# Patient Record
Sex: FEMALE | Race: WHITE | NOT HISPANIC OR LATINO
[De-identification: names, ages, dates, MRNs, and addresses within clinical notes are randomized per-mention and may not be internally consistent; named-entity substitution may affect disease eponyms.]

---

## 2017-09-26 ENCOUNTER — RESULT REVIEW (OUTPATIENT)
Age: 47
End: 2017-09-26

## 2017-10-11 ENCOUNTER — TRANSCRIPTION ENCOUNTER (OUTPATIENT)
Age: 47
End: 2017-10-11

## 2018-05-11 ENCOUNTER — LABORATORY RESULT (OUTPATIENT)
Age: 48
End: 2018-05-11

## 2018-05-11 ENCOUNTER — APPOINTMENT (OUTPATIENT)
Dept: PLASTIC SURGERY | Facility: CLINIC | Age: 48
End: 2018-05-11
Payer: MEDICAID

## 2018-05-11 VITALS — BODY MASS INDEX: 37.79 KG/M2 | HEIGHT: 72 IN | WEIGHT: 279 LBS

## 2018-05-11 PROCEDURE — 11422 EXC H-F-NK-SP B9+MARG 1.1-2: CPT | Mod: 59

## 2018-05-11 PROCEDURE — 99204 OFFICE O/P NEW MOD 45 MIN: CPT | Mod: 25

## 2018-05-11 PROCEDURE — 14040 TIS TRNFR F/C/C/M/N/A/G/H/F: CPT

## 2018-05-24 ENCOUNTER — APPOINTMENT (OUTPATIENT)
Dept: PLASTIC SURGERY | Facility: CLINIC | Age: 48
End: 2018-05-24
Payer: MEDICAID

## 2018-05-24 PROCEDURE — 99024 POSTOP FOLLOW-UP VISIT: CPT

## 2018-06-29 ENCOUNTER — APPOINTMENT (OUTPATIENT)
Dept: PLASTIC SURGERY | Facility: CLINIC | Age: 48
End: 2018-06-29
Payer: MEDICAID

## 2018-06-29 DIAGNOSIS — L81.7 PIGMENTED PURPURIC DERMATOSIS: ICD-10-CM

## 2018-06-29 DIAGNOSIS — G43.909 MIGRAINE, UNSPECIFIED, NOT INTRACTABLE, W/OUT STATUS MIGRAINOSUS: ICD-10-CM

## 2018-06-29 DIAGNOSIS — L98.0 PYOGENIC GRANULOMA: ICD-10-CM

## 2018-06-29 PROCEDURE — 99024 POSTOP FOLLOW-UP VISIT: CPT

## 2018-07-03 RX ORDER — HYDROCHLOROTHIAZIDE 12.5 MG/1
12.5 TABLET ORAL
Qty: 90 | Refills: 0 | Status: ACTIVE | COMMUNITY
Start: 2018-03-19

## 2018-07-03 RX ORDER — ALBUTEROL SULFATE 0.63 MG/3ML
0.63 SOLUTION RESPIRATORY (INHALATION)
Qty: 270 | Refills: 0 | Status: ACTIVE | COMMUNITY
Start: 2018-01-08

## 2018-07-25 PROBLEM — L81.7 SCHAMBERG'S DISEASE: Status: ACTIVE | Noted: 2018-05-11

## 2018-07-25 PROBLEM — G43.909 MIGRAINES: Status: ACTIVE | Noted: 2018-05-11

## 2018-07-25 PROBLEM — L98.0 PYOGENIC GRANULOMA: Status: ACTIVE | Noted: 2018-05-14

## 2019-02-19 ENCOUNTER — APPOINTMENT (OUTPATIENT)
Dept: VASCULAR SURGERY | Facility: CLINIC | Age: 49
End: 2019-02-19
Payer: MEDICAID

## 2019-02-19 VITALS
SYSTOLIC BLOOD PRESSURE: 125 MMHG | WEIGHT: 257 LBS | DIASTOLIC BLOOD PRESSURE: 78 MMHG | HEART RATE: 89 BPM | TEMPERATURE: 98 F | HEIGHT: 72 IN | BODY MASS INDEX: 34.81 KG/M2

## 2019-02-19 DIAGNOSIS — Z81.8 FAMILY HISTORY OF OTHER MENTAL AND BEHAVIORAL DISORDERS: ICD-10-CM

## 2019-02-19 DIAGNOSIS — I83.90 ASYMPTOMATIC VARICOSE VEINS OF UNSPECIFIED LOWER EXTREMITY: ICD-10-CM

## 2019-02-19 PROCEDURE — 93970 EXTREMITY STUDY: CPT

## 2019-02-19 PROCEDURE — 99204 OFFICE O/P NEW MOD 45 MIN: CPT

## 2019-02-19 RX ORDER — GLUC/MSM/COLGN2/HYAL/ANTIARTH3 375-375-20
TABLET ORAL
Refills: 0 | Status: ACTIVE | COMMUNITY

## 2019-02-19 RX ORDER — MELATONIN 3 MG
TABLET ORAL
Refills: 0 | Status: ACTIVE | COMMUNITY

## 2019-02-19 RX ORDER — CYST/ALA/Q10/PHOS.SER/DHA/BROC 100-20-50
500 POWDER (GRAM) ORAL
Refills: 0 | Status: ACTIVE | COMMUNITY

## 2019-02-19 RX ORDER — FLUCONAZOLE 150 MG/1
150 TABLET ORAL
Qty: 1 | Refills: 0 | Status: COMPLETED | COMMUNITY
Start: 2017-09-26 | End: 2019-02-19

## 2019-02-19 RX ORDER — PROMETHAZINE HYDROCHLORIDE AND DEXTROMETHORPHAN HYDROBROMIDE ORAL SOLUTION 15; 6.25 MG/5ML; MG/5ML
6.25-15 SOLUTION ORAL
Qty: 118 | Refills: 0 | Status: COMPLETED | COMMUNITY
Start: 2018-02-23 | End: 2019-02-19

## 2019-02-19 RX ORDER — TRIAMCINOLONE ACETONIDE 1 MG/G
0.1 OINTMENT TOPICAL
Qty: 30 | Refills: 0 | Status: COMPLETED | COMMUNITY
Start: 2017-08-01 | End: 2019-02-19

## 2019-02-19 RX ORDER — MULTIVIT-MIN/IRON/FOLIC ACID/K 18-600-40
CAPSULE ORAL
Refills: 0 | Status: ACTIVE | COMMUNITY

## 2019-02-19 RX ORDER — AZITHROMYCIN 250 MG/1
250 TABLET, FILM COATED ORAL
Qty: 6 | Refills: 0 | Status: COMPLETED | COMMUNITY
Start: 2018-02-23 | End: 2019-02-19

## 2019-02-19 RX ORDER — MULTIVITAMIN
TABLET ORAL
Refills: 0 | Status: ACTIVE | COMMUNITY

## 2019-02-19 RX ORDER — PNV NO.95/FERROUS FUM/FOLIC AC 28MG-0.8MG
TABLET ORAL
Refills: 0 | Status: ACTIVE | COMMUNITY

## 2019-02-19 RX ORDER — NAPROXEN 500 MG/1
500 TABLET ORAL
Qty: 60 | Refills: 0 | Status: COMPLETED | COMMUNITY
Start: 2017-07-18 | End: 2019-02-19

## 2019-02-19 RX ORDER — DILTIAZEM HYDROCHLORIDE 120 MG/1
120 TABLET, COATED ORAL
Refills: 0 | Status: COMPLETED | COMMUNITY
End: 2019-02-19

## 2019-02-19 RX ORDER — PSYLLIUM HUSK 0.4 G
CAPSULE ORAL
Refills: 0 | Status: ACTIVE | COMMUNITY

## 2019-02-19 RX ORDER — DILTIAZEM HYDROCHLORIDE 120 MG/1
120 CAPSULE, EXTENDED RELEASE ORAL
Qty: 30 | Refills: 0 | Status: COMPLETED | COMMUNITY
Start: 2017-12-28 | End: 2019-02-19

## 2019-02-19 NOTE — ASSESSMENT
[FreeTextEntry1] : 47 yo female with history of pre-htn, asthma, schamberg's disease, migraines presents for evaluation of lower extremity varicose veins with recent history of bleeding varicose vein and skin ulceration \par venous duplex shows insufficiency throughout the gsv bilaterally \par given the recent bleed and eschar over skin ulceration would recommend ablation and stab phlebectomy at this time \par will schedule for ablation in the near future

## 2019-02-19 NOTE — HISTORY OF PRESENT ILLNESS
[FreeTextEntry1] : 47 yo female with history of pre-htn, asthma, schamberg's disease, migraines presents for evaluation of lower extremity varicose veins with recent history of bleeding varicose vein and skin ulceration.  pt states that her legs have been very puritic and she was scratching which had initiated the break in the skin leading to the bleeding episode about 3 weeks ago.  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  she wears sport compression stockings but is unsure of the pressure.  pt states that in January was started on a steroid cream for the leg puritis and discoloration.  \par she has been treating the ulcer with peroxide and Betadine over the last few weeks \par pt denies any history of ulcers in the past, or dvt\par pt denies any history of lower extremity pain or difficulty walking

## 2019-02-19 NOTE — PHYSICAL EXAM
[2+] : left 2+ [Ankle Swelling (On Exam)] : present [Varicose Veins Of Lower Extremities] : bilaterally [Ankle Swelling Bilaterally] : severe [] : bilaterally [Ankle Swelling On The Left] : moderate [No Rash or Lesion] : No rash or lesion [Skin Ulcer] : ulcer [Alert] : alert [Calm] : calm [JVD] : no jugular venous distention  [Abdomen Tenderness] : ~T ~M No abdominal tenderness [de-identified] : appears well [de-identified] : motor intact b/l lower extremities, muscle strength 5/5 with dorsi and plantar flexion\par

## 2019-03-03 ENCOUNTER — TRANSCRIPTION ENCOUNTER (OUTPATIENT)
Age: 49
End: 2019-03-03

## 2019-03-08 ENCOUNTER — APPOINTMENT (OUTPATIENT)
Dept: ENDOVASCULAR SURGERY | Facility: CLINIC | Age: 49
End: 2019-03-08

## 2019-03-29 ENCOUNTER — APPOINTMENT (OUTPATIENT)
Dept: ENDOVASCULAR SURGERY | Facility: CLINIC | Age: 49
End: 2019-03-29
Payer: MEDICAID

## 2019-03-29 ENCOUNTER — LABORATORY RESULT (OUTPATIENT)
Age: 49
End: 2019-03-29

## 2019-03-29 PROCEDURE — 36475 ENDOVENOUS RF 1ST VEIN: CPT | Mod: RT

## 2019-03-29 PROCEDURE — 37765 STAB PHLEB VEINS XTR 10-20: CPT | Mod: RT

## 2019-04-02 ENCOUNTER — APPOINTMENT (OUTPATIENT)
Dept: VASCULAR SURGERY | Facility: CLINIC | Age: 49
End: 2019-04-02
Payer: MEDICAID

## 2019-04-02 PROCEDURE — 99024 POSTOP FOLLOW-UP VISIT: CPT

## 2019-04-02 PROCEDURE — 93971 EXTREMITY STUDY: CPT

## 2019-04-04 NOTE — DISCUSSION/SUMMARY
[FreeTextEntry1] : 47 yo female with history of venous stasis ulcer presents for follow up after ablation and stab phlebectomy \par duplex shows gsv ablated no evidence of dvt \par recommend compression and elevation pt to follow up in 1 month

## 2019-04-04 NOTE — REASON FOR VISIT
[de-identified] : right lower extremity gsv ablation and stab phlebectomy [de-identified] : 47 yo with history of venous insufficiency s/p ablation and stab phlebectomy presents for follow up without any complaints.

## 2019-04-04 NOTE — PHYSICAL EXAM
[Ankle Swelling (On Exam)] : present [Ankle Swelling Bilaterally] : bilaterally  [Ankle Swelling On The Right] : mild [] : of the right leg [Ankle Swelling On The Left] : moderate [JVD] : no jugular venous distention  [Varicose Veins Of Lower Extremities] : not present [de-identified] : appears well  [de-identified] : erythema over right medial thigh, venous stasis ulcer over the right distal medial shin with pink granular base

## 2019-05-07 ENCOUNTER — APPOINTMENT (OUTPATIENT)
Dept: VASCULAR SURGERY | Facility: CLINIC | Age: 49
End: 2019-05-07
Payer: MEDICAID

## 2019-05-07 VITALS
TEMPERATURE: 98 F | SYSTOLIC BLOOD PRESSURE: 120 MMHG | HEIGHT: 72 IN | WEIGHT: 154 LBS | HEART RATE: 85 BPM | BODY MASS INDEX: 20.86 KG/M2 | DIASTOLIC BLOOD PRESSURE: 79 MMHG

## 2019-05-07 PROCEDURE — 99213 OFFICE O/P EST LOW 20 MIN: CPT | Mod: 24

## 2019-05-07 NOTE — PHYSICAL EXAM
[2+] : left 2+ [Varicose Veins Of Lower Extremities] : bilaterally [Ankle Swelling On The Right] : mild [] : of the right leg [No Rash or Lesion] : No rash or lesion [Alert] : alert [Calm] : calm [JVD] : no jugular venous distention  [Ankle Swelling (On Exam)] : not present [Skin Ulcer] : no ulcer [de-identified] : appears well

## 2019-05-07 NOTE — HISTORY OF PRESENT ILLNESS
[FreeTextEntry1] : 50 yo female with history of venous stasis ulcer s/p ablation of the right lower extremity presents for follow up.  ulcer is healed but has noted some puritis around the area otherwise pt denies any pain of the lower extremities.  she has been wearing the compression stockings without any issues

## 2019-05-07 NOTE — ASSESSMENT
[FreeTextEntry1] : 50 yo female with history of venous stasis ulcer s/p ablation of the right lower extremity presents for follow up\par pt doing well after ablation will start lidex for the puritis no to be used for longer then 2 weeks\par pt to follow up in 1 month and continue with compression stockings

## 2019-06-11 ENCOUNTER — APPOINTMENT (OUTPATIENT)
Dept: VASCULAR SURGERY | Facility: CLINIC | Age: 49
End: 2019-06-11
Payer: MEDICAID

## 2019-06-11 VITALS — WEIGHT: 263 LBS | HEIGHT: 72 IN | BODY MASS INDEX: 35.62 KG/M2

## 2019-06-11 PROCEDURE — 99024 POSTOP FOLLOW-UP VISIT: CPT

## 2019-06-11 PROCEDURE — XXXXX: CPT

## 2019-06-11 NOTE — PHYSICAL EXAM
[JVD] : no jugular venous distention  [2+] : left 2+ [Ankle Swelling (On Exam)] : not present [Varicose Veins Of Lower Extremities] : present [Varicose Veins Of The Left Leg] : of the left leg [Ankle Swelling On The Left] : moderate [] : of the right leg [Ankle Swelling On The Right] : mild [No Rash or Lesion] : No rash or lesion [Skin Ulcer] : no ulcer [Alert] : alert [Calm] : calm [de-identified] : appears well

## 2019-06-11 NOTE — HISTORY OF PRESENT ILLNESS
[FreeTextEntry1] : 50 yo female with history of venous stasis ulcer s/p ablation of the right lower extremity gsv presents for follow up.  ulcer is healed and the puritis has improved \par pt without any complaints at this time states that she is wearing her compression stockings.

## 2019-06-11 NOTE — ASSESSMENT
[FreeTextEntry1] : 48 yo female with history of venous stasis ulcer s/p ablation of the right lower extremity gsv presents for follow up without any complaints\par ulcer has healed and pt without any complaints at this time\par pt to continue to wear the compression stockings and advised to follow up as needed

## 2019-11-07 ENCOUNTER — OUTPATIENT (OUTPATIENT)
Dept: OUTPATIENT SERVICES | Facility: HOSPITAL | Age: 49
LOS: 1 days | End: 2019-11-07
Payer: MEDICAID

## 2019-11-07 ENCOUNTER — APPOINTMENT (OUTPATIENT)
Dept: MAMMOGRAPHY | Facility: IMAGING CENTER | Age: 49
End: 2019-11-07
Payer: MEDICAID

## 2019-11-07 ENCOUNTER — APPOINTMENT (OUTPATIENT)
Dept: ULTRASOUND IMAGING | Facility: IMAGING CENTER | Age: 49
End: 2019-11-07
Payer: MEDICAID

## 2019-11-07 DIAGNOSIS — Z00.8 ENCOUNTER FOR OTHER GENERAL EXAMINATION: ICD-10-CM

## 2019-11-07 PROCEDURE — 77067 SCR MAMMO BI INCL CAD: CPT | Mod: 26

## 2019-11-07 PROCEDURE — 77063 BREAST TOMOSYNTHESIS BI: CPT | Mod: 26

## 2019-11-07 PROCEDURE — 76641 ULTRASOUND BREAST COMPLETE: CPT | Mod: 26,50

## 2019-11-07 PROCEDURE — 77063 BREAST TOMOSYNTHESIS BI: CPT

## 2019-11-07 PROCEDURE — 77067 SCR MAMMO BI INCL CAD: CPT

## 2019-11-07 PROCEDURE — 76641 ULTRASOUND BREAST COMPLETE: CPT

## 2021-02-13 ENCOUNTER — TRANSCRIPTION ENCOUNTER (OUTPATIENT)
Age: 51
End: 2021-02-13

## 2021-03-25 ENCOUNTER — RESULT REVIEW (OUTPATIENT)
Age: 51
End: 2021-03-25

## 2021-05-17 ENCOUNTER — NON-APPOINTMENT (OUTPATIENT)
Age: 51
End: 2021-05-17

## 2021-06-09 DIAGNOSIS — Z01.818 ENCOUNTER FOR OTHER PREPROCEDURAL EXAMINATION: ICD-10-CM

## 2021-06-11 ENCOUNTER — APPOINTMENT (OUTPATIENT)
Dept: DISASTER EMERGENCY | Facility: CLINIC | Age: 51
End: 2021-06-11

## 2021-06-11 LAB — SARS-COV-2 N GENE NPH QL NAA+PROBE: NOT DETECTED

## 2021-06-16 ENCOUNTER — NON-APPOINTMENT (OUTPATIENT)
Age: 51
End: 2021-06-16

## 2021-06-16 ENCOUNTER — LABORATORY RESULT (OUTPATIENT)
Age: 51
End: 2021-06-16

## 2021-06-16 ENCOUNTER — APPOINTMENT (OUTPATIENT)
Dept: PULMONOLOGY | Facility: CLINIC | Age: 51
End: 2021-06-16
Payer: MEDICAID

## 2021-06-16 VITALS
TEMPERATURE: 98 F | RESPIRATION RATE: 16 BRPM | SYSTOLIC BLOOD PRESSURE: 148 MMHG | OXYGEN SATURATION: 98 % | DIASTOLIC BLOOD PRESSURE: 85 MMHG | HEART RATE: 63 BPM

## 2021-06-16 LAB
BASOPHILS # BLD AUTO: 0.04 K/UL
BASOPHILS NFR BLD AUTO: 0.6 %
EOSINOPHIL # BLD AUTO: 0.32 K/UL
EOSINOPHIL NFR BLD AUTO: 5 %
HCT VFR BLD CALC: 38.4 %
HGB BLD-MCNC: 13.2 G/DL
IMM GRANULOCYTES NFR BLD AUTO: 0.3 %
LYMPHOCYTES # BLD AUTO: 1.37 K/UL
LYMPHOCYTES NFR BLD AUTO: 21.6 %
MAN DIFF?: NORMAL
MCHC RBC-ENTMCNC: 32 PG
MCHC RBC-ENTMCNC: 34.4 GM/DL
MCV RBC AUTO: 93.2 FL
MONOCYTES # BLD AUTO: 0.44 K/UL
MONOCYTES NFR BLD AUTO: 6.9 %
NEUTROPHILS # BLD AUTO: 4.16 K/UL
NEUTROPHILS NFR BLD AUTO: 65.6 %
PLATELET # BLD AUTO: 236 K/UL
POCT - HEMOGLOBIN (HGB), QUANTITATIVE, TRANSCUTANEOUS: 11.6
RBC # BLD: 4.12 M/UL
RBC # FLD: 11.9 %
WBC # FLD AUTO: 6.35 K/UL

## 2021-06-16 PROCEDURE — 94727 GAS DIL/WSHOT DETER LNG VOL: CPT

## 2021-06-16 PROCEDURE — 94010 BREATHING CAPACITY TEST: CPT

## 2021-06-16 PROCEDURE — ZZZZZ: CPT

## 2021-06-16 PROCEDURE — 88738 HGB QUANT TRANSCUTANEOUS: CPT

## 2021-06-16 PROCEDURE — 94729 DIFFUSING CAPACITY: CPT

## 2021-06-16 PROCEDURE — 99205 OFFICE O/P NEW HI 60 MIN: CPT | Mod: 25

## 2021-06-16 PROCEDURE — 71046 X-RAY EXAM CHEST 2 VIEWS: CPT

## 2021-06-16 RX ORDER — BECLOMETHASONE DIPROPIONATE HFA 80 UG/1
80 AEROSOL, METERED RESPIRATORY (INHALATION)
Qty: 11 | Refills: 0 | Status: DISCONTINUED | COMMUNITY
Start: 2018-03-02 | End: 2021-06-16

## 2021-06-16 NOTE — PROCEDURE
[FreeTextEntry1] : Blood Draw\par \par PFT June 16, 2021\par Well-preserved flow rates\par Mild obstructive ventilatory impairment\par Normal lung volumes\par Air trapping with RV/TLC ratio 131% predicted\par Diffusion normal\par \par Chest x-ray PA lateral March 16, 2021\par Normal cardiac size\par Hyper aeration increased retrosternal airspace\par Lung grossly clear lungs without parenchymal infiltrates pleural effusions or dominant pulmonary nodules\par

## 2021-06-16 NOTE — REVIEW OF SYSTEMS
[Negative] : Endocrine [TextBox_30] : HPI [TextBox_44] : History of PVCs on beta-blocker [TextBox_57] : ? Dogs [TextBox_94] : Cervical spine degeneration [TextBox_104] : Hx pyogenic granuloma [TextBox_122] : Migraine headache [TextBox_151] : Schamberg disease, varicose veins

## 2021-06-16 NOTE — DISCUSSION/SUMMARY
[FreeTextEntry1] : Chronic persistent asthma\par Pulmonary physiology mild obstructive ventilatory impairment air-trapping\par Vascular history as noted above\par History of secondhand passive tobacco exposure\par Rule out atopy  allergenic component\par History of PVCs on beta-blocker but as long as she demonstrates continued stable pulmonary physiology without decline on above-noted medications would not discontinue beta-blocker at this time\par Recommendations\par Blood draw for asthma profile Food IgE serum IgE level eosinophil count\par Agree with current dosing of Wixela  50-50 1 puff twice daily with instructions to rinse\par Singulair 10 mg daily with food\par As needed short acting beta agonist.asthma\par Notify of any wheezing.  Notify if rescue inhaler is needed greater than 2-3 times in any week.  Avoid known triggers.\par \par \par

## 2021-06-16 NOTE — CONSULT LETTER
[Dear  ___] : Dear  [unfilled], [Consult Letter:] : I had the pleasure of evaluating your patient, [unfilled]. [Please see my note below.] : Please see my note below. [Consult Closing:] : Thank you very much for allowing me to participate in the care of this patient.  If you have any questions, please do not hesitate to contact me. [Sincerely,] : Sincerely, [FreeTextEntry3] : Abhay Brown D.O., GEMMA\par  of Medicine\par [facility] [employer]\par

## 2021-06-16 NOTE — HISTORY OF PRESENT ILLNESS
[Never] : never [TextBox_4] : Pulmonary evaluation\par Longstanding history of asthma since the age of 24\par She states that she has had dog exposure dating back to November 2020 she is young for asthma-like symptoms within allergy component\par She has had wheezing chest congestion cough\par Subsequently her Qvar that she had been previously treated with was discontinued and she was changed to Wixela continue dosing of Singulair with interval improvement of symptoms\par She states she believes in November she was treated with a course of steroids for short-term\par She is not actively wheezing\par No reported purulent sputum hemoptysis\par No fevers chills or sweats\par She denies shortness of breath dyspnea on exertion pleuritic chest pain\par She has no history of hospitalization or intubation for asthma\par Denies history of pneumonia tuberculosis latent tuberculosis interstitial lung disease obstructive sleep apnea pulmonary embolism\par She does report that she is a non-smoker but had significant secondhand tobacco smoke use\par Notes on beta-blocker for PVCs\par

## 2021-06-16 NOTE — PHYSICAL EXAM
[No Acute Distress] : no acute distress [Normal Oropharynx] : normal oropharynx [II] : Mallampati Class: II [Normal Appearance] : normal appearance [Supple] : supple [No Neck Mass] : no neck mass [No JVD] : no jvd [Normal Rate/Rhythm] : normal rate/rhythm [Normal Pulses] : normal pulses [Normal PMI] : normal pmi [Normal S1, S2] : normal s1, s2 [No Murmurs] : no murmurs [No Rubs] : no rubs [No Gallops] : no gallops [No Resp Distress] : no resp distress [No Acc Muscle Use] : no acc muscle use [Normal Palpation] : normal palpation [Normal Rhythm and Effort] : normal rhythm and effort [Clear to Auscultation Bilaterally] : clear to auscultation bilaterally [No Abnormalities] : no abnormalities [Benign] : benign [Not Tender] : not tender [Soft] : soft [Normal Bowel Sounds] : normal bowel sounds [No HSM] : no hsm [Normal Gait] : normal gait [No Clubbing] : no clubbing [No Cyanosis] : no cyanosis [No Edema] : no edema [FROM] : FROM [Normal Color/ Pigmentation] : normal color/ pigmentation [No Focal Deficits] : no focal deficits [Oriented x3] : oriented x3 [Normal Mood] : normal mood [Normal Affect] : normal affect [TextBox_2] : Overweight

## 2021-06-17 DIAGNOSIS — J30.2 OTHER SEASONAL ALLERGIC RHINITIS: ICD-10-CM

## 2021-06-18 LAB
A ALTERNATA IGE QN: 6.33 KUA/L
A FUMIGATUS IGE QN: 0.12 KUA/L
C ALBICANS IGE QN: <0.1 KUA/L
C HERBARUM IGE QN: <0.1 KUA/L
CAT DANDER IGE QN: 35.1 KUA/L
CLAM IGE QN: <0.1 KUA/L
CODFISH IGE QN: <0.1 KUA/L
COMMON RAGWEED IGE QN: 0.82 KUA/L
CORN IGE QN: 0.38 KUA/L
COW MILK IGE QN: 0.19 KUA/L
D FARINAE IGE QN: 2.85 KUA/L
D PTERONYSS IGE QN: 1.78 KUA/L
DEPRECATED A ALTERNATA IGE RAST QL: 3
DEPRECATED A FUMIGATUS IGE RAST QL: NORMAL
DEPRECATED C ALBICANS IGE RAST QL: 0
DEPRECATED C HERBARUM IGE RAST QL: 0
DEPRECATED CAT DANDER IGE RAST QL: 4
DEPRECATED CLAM IGE RAST QL: 0
DEPRECATED CODFISH IGE RAST QL: 0
DEPRECATED COMMON RAGWEED IGE RAST QL: 2
DEPRECATED CORN IGE RAST QL: 1
DEPRECATED COW MILK IGE RAST QL: NORMAL
DEPRECATED D FARINAE IGE RAST QL: 2
DEPRECATED D PTERONYSS IGE RAST QL: 2
DEPRECATED DOG DANDER IGE RAST QL: 3
DEPRECATED EGG WHITE IGE RAST QL: NORMAL
DEPRECATED M RACEMOSUS IGE RAST QL: 0
DEPRECATED PEANUT IGE RAST QL: 2
DEPRECATED ROACH IGE RAST QL: 0
DEPRECATED SCALLOP IGE RAST QL: <0.1 KUA/L
DEPRECATED SESAME SEED IGE RAST QL: 1
DEPRECATED SHRIMP IGE RAST QL: 0
DEPRECATED SOYBEAN IGE RAST QL: NORMAL
DEPRECATED TIMOTHY IGE RAST QL: 1
DEPRECATED WALNUT IGE RAST QL: NORMAL
DEPRECATED WHEAT IGE RAST QL: NORMAL
DEPRECATED WHITE OAK IGE RAST QL: 3
DOG DANDER IGE QN: 14.8 KUA/L
EGG WHITE IGE QN: 0.34 KUA/L
M RACEMOSUS IGE QN: <0.1 KUA/L
PEANUT IGE QN: 1.39 KUA/L
ROACH IGE QN: <0.1 KUA/L
SCALLOP IGE QN: 0
SCALLOP IGE QN: <0.1 KUA/L
SESAME SEED IGE QN: 0.43 KUA/L
SOYBEAN IGE QN: 0.2 KUA/L
TIMOTHY IGE QN: 0.46 KUA/L
WALNUT IGE QN: 0.13 KUA/L
WHEAT IGE QN: 0.16 KUA/L
WHITE OAK IGE QN: 3.56 KUA/L

## 2021-06-20 LAB
A FLAVUS AB FLD QL: NEGATIVE
A FUMIGATUS AB FLD QL: NEGATIVE
A NIGER AB FLD QL: NEGATIVE

## 2021-07-22 ENCOUNTER — APPOINTMENT (OUTPATIENT)
Dept: PULMONOLOGY | Facility: CLINIC | Age: 51
End: 2021-07-22

## 2021-08-12 ENCOUNTER — APPOINTMENT (OUTPATIENT)
Dept: PULMONOLOGY | Facility: CLINIC | Age: 51
End: 2021-08-12
Payer: MEDICAID

## 2021-08-12 VITALS
HEART RATE: 57 BPM | OXYGEN SATURATION: 96 % | SYSTOLIC BLOOD PRESSURE: 130 MMHG | DIASTOLIC BLOOD PRESSURE: 81 MMHG | TEMPERATURE: 97.9 F

## 2021-08-12 PROCEDURE — 94010 BREATHING CAPACITY TEST: CPT

## 2021-08-12 PROCEDURE — 99213 OFFICE O/P EST LOW 20 MIN: CPT | Mod: 25

## 2021-08-12 NOTE — CONSULT LETTER
[Dear  ___] : Dear  [unfilled], [Consult Letter:] : I had the pleasure of evaluating your patient, [unfilled]. [Please see my note below.] : Please see my note below. [Consult Closing:] : Thank you very much for allowing me to participate in the care of this patient.  If you have any questions, please do not hesitate to contact me. [Sincerely,] : Sincerely, [FreeTextEntry3] : Abhay Brown D.O., GEMMA\par  of Medicine\par [facility] [employer]\par

## 2021-08-12 NOTE — PHYSICAL EXAM
[No Acute Distress] : no acute distress [Normal Oropharynx] : normal oropharynx [II] : Mallampati Class: II [Normal Appearance] : normal appearance [Supple] : supple [No Neck Mass] : no neck mass [No JVD] : no jvd [Normal Rate/Rhythm] : normal rate/rhythm [Normal Pulses] : normal pulses [Normal PMI] : normal pmi [Normal S1, S2] : normal s1, s2 [No Murmurs] : no murmurs [No Rubs] : no rubs [No Gallops] : no gallops [No Resp Distress] : no resp distress [No Acc Muscle Use] : no acc muscle use [Normal Palpation] : normal palpation [Normal Rhythm and Effort] : normal rhythm and effort [Clear to Auscultation Bilaterally] : clear to auscultation bilaterally [No Abnormalities] : no abnormalities [Benign] : benign [Not Tender] : not tender [Soft] : soft [No HSM] : no hsm [Normal Bowel Sounds] : normal bowel sounds [Normal Gait] : normal gait [No Clubbing] : no clubbing [No Cyanosis] : no cyanosis [No Edema] : no edema [FROM] : FROM [Normal Color/ Pigmentation] : normal color/ pigmentation [No Focal Deficits] : no focal deficits [Oriented x3] : oriented x3 [Normal Mood] : normal mood [Normal Affect] : normal affect [TextBox_2] : Overweight

## 2021-08-12 NOTE — PROCEDURE
[FreeTextEntry1] : HUE No BD 8/12/21\par Mild OAD\par  stable pulmonary physiology\par \par PFT June 16, 2021\par Well-preserved flow rates\par Mild obstructive ventilatory impairment\par Normal lung volumes\par Air trapping with RV/TLC ratio 131% predicted\par Diffusion normal\par \par Chest x-ray PA lateral  June 16, 2021\par Normal cardiac size\par Hyper aeration increased retrosternal airspace\par Lung grossly clear lungs without parenchymal infiltrates pleural effusions or dominant pulmonary nodules\par

## 2021-08-12 NOTE — DISCUSSION/SUMMARY
[FreeTextEntry1] : Chronic persistent asthma\par Pulmonary physiology mild obstructive ventilatory impairment air-trapping\par Atopy elevated serum IgE level\par Vascular history as noted above\par History of secondhand passive tobacco exposure\par Rule out atopy  allergenic component\par History of PVCs on beta-blocker but as long as she demonstrates continued stable pulmonary physiology without decline on above-noted medications would not discontinue beta-blocker at this time\par Recommendations\par Blood draw for asthma profile Food IgE serum IgE level eosinophil count noted\par Agree with current dosing of Wixela  50-50 1 puff twice daily with instructions to rinse\par Singulair 10 mg daily with food\par As needed short acting beta agonist.asthma\par Notify of any wheezing.  Notify if rescue inhaler is needed greater than 2-3 times in any week.  Avoid known triggers.\par \par \par

## 2021-11-05 DIAGNOSIS — Z01.812 ENCOUNTER FOR PREPROCEDURAL LABORATORY EXAMINATION: ICD-10-CM

## 2021-11-08 ENCOUNTER — APPOINTMENT (OUTPATIENT)
Dept: DISASTER EMERGENCY | Facility: CLINIC | Age: 51
End: 2021-11-08

## 2021-11-09 LAB — SARS-COV-2 N GENE NPH QL NAA+PROBE: NOT DETECTED

## 2021-11-12 ENCOUNTER — APPOINTMENT (OUTPATIENT)
Dept: PULMONOLOGY | Facility: CLINIC | Age: 51
End: 2021-11-12
Payer: MEDICAID

## 2021-11-12 DIAGNOSIS — Z77.22 CONTACT WITH AND (SUSPECTED) EXPOSURE TO ENVIRONMENTAL TOBACCO SMOKE (ACUTE) (CHRONIC): ICD-10-CM

## 2021-11-12 PROCEDURE — 88738 HGB QUANT TRANSCUTANEOUS: CPT

## 2021-11-12 PROCEDURE — 94727 GAS DIL/WSHOT DETER LNG VOL: CPT

## 2021-11-12 PROCEDURE — ZZZZZ: CPT

## 2021-11-12 PROCEDURE — 99214 OFFICE O/P EST MOD 30 MIN: CPT | Mod: 25

## 2021-11-12 PROCEDURE — 94729 DIFFUSING CAPACITY: CPT

## 2021-11-12 PROCEDURE — 94010 BREATHING CAPACITY TEST: CPT

## 2021-11-12 NOTE — REVIEW OF SYSTEMS
[Recent Wt Loss (___ Lbs)] : ~T recent [unfilled] lb weight loss [Negative] : Endocrine [TextBox_30] : HPI [TextBox_44] : History of PVCs on beta-blocker [TextBox_57] : ? Dogs [TextBox_94] : Cervical spine degeneration [TextBox_104] : Hx pyogenic granuloma [TextBox_122] : Migraine headache [TextBox_151] : Schamberg disease, varicose veins

## 2021-11-12 NOTE — PROCEDURE
[FreeTextEntry1] : PFT 11/12/11\par Flow rates nl\par TLC 92 % pred\par DLCO 94 %\par HGB 13.3\par \par HUE No BD 8/12/21\par Mild OAD\par  stable pulmonary physiology\par \par PFT June 16, 2021\par Well-preserved flow rates\par Mild obstructive ventilatory impairment\par Normal lung volumes\par Air trapping with RV/TLC ratio 131% predicted\par Diffusion normal\par \par Chest x-ray PA lateral  June 16, 2021\par Normal cardiac size\par Hyper aeration increased retrosternal airspace\par Lung grossly clear lungs without parenchymal infiltrates pleural effusions or dominant pulmonary nodules\par

## 2021-11-12 NOTE — DISCUSSION/SUMMARY
[FreeTextEntry1] : Chronic persistent asthma\par seasonal allergy\par Pulmonary physiology mild obstructive ventilatory impairment air-trapping\par Atopy elevated serum IgE level\par Vascular history as noted above\par History of secondhand passive tobacco exposure\par Rule out atopy  allergenic component\par History of PVCs on beta-blocker but as long as she demonstrates continued stable pulmonary physiology without decline on above-noted medications would not discontinue beta-blocker at this time\par Recommendations\par Blood draw for asthma profile Food IgE serum IgE level eosinophil count noted\par  current dosing of Wixela  250-50 1 puff twice daily with instructions to rinse\par Singulair 10 mg daily with food\par As needed short acting beta agonist.asthma\par Notify of any wheezing.  Notify if rescue inhaler is needed greater than 2-3 times in any week.  Avoid known triggers.\par \par \par

## 2021-11-12 NOTE — CONSULT LETTER
[Dear  ___] : Dear  [unfilled], [Consult Letter:] : I had the pleasure of evaluating your patient, [unfilled]. [Please see my note below.] : Please see my note below. [Consult Closing:] : Thank you very much for allowing me to participate in the care of this patient.  If you have any questions, please do not hesitate to contact me. [Sincerely,] : Sincerely, [FreeTextEntry3] : Abhay Brown D.O., GEMMA\par  of Medicine\par [facility] [employer]\par

## 2022-02-16 ENCOUNTER — APPOINTMENT (OUTPATIENT)
Dept: PULMONOLOGY | Facility: CLINIC | Age: 52
End: 2022-02-16
Payer: MEDICAID

## 2022-02-16 VITALS
OXYGEN SATURATION: 99 % | DIASTOLIC BLOOD PRESSURE: 79 MMHG | RESPIRATION RATE: 16 BRPM | WEIGHT: 287 LBS | BODY MASS INDEX: 38.87 KG/M2 | SYSTOLIC BLOOD PRESSURE: 147 MMHG | HEIGHT: 72 IN | TEMPERATURE: 98 F | HEART RATE: 58 BPM

## 2022-02-16 DIAGNOSIS — J30.81 ALLERGIC RHINITIS DUE TO ANIMAL (CAT) (DOG) HAIR AND DANDER: ICD-10-CM

## 2022-02-16 LAB — POCT - HEMOGLOBIN (HGB), QUANTITATIVE, TRANSCUTANEOUS: 13.9

## 2022-02-16 PROCEDURE — 99213 OFFICE O/P EST LOW 20 MIN: CPT | Mod: 25

## 2022-02-16 PROCEDURE — 94727 GAS DIL/WSHOT DETER LNG VOL: CPT

## 2022-02-16 PROCEDURE — 94010 BREATHING CAPACITY TEST: CPT

## 2022-02-16 PROCEDURE — ZZZZZ: CPT

## 2022-02-16 PROCEDURE — 88738 HGB QUANT TRANSCUTANEOUS: CPT

## 2022-02-16 PROCEDURE — 94729 DIFFUSING CAPACITY: CPT

## 2022-02-16 NOTE — PROCEDURE
[FreeTextEntry1] : PFT 2/16/22\par Well preserved flow rates\par mild OAD\par  Lung Volumes nl\par DLCO 89 % pred\par  HGB 13.9\par \par PFT 11/12/11\par Flow rates nl\par TLC 92 % pred\par DLCO 94 %\par HGB 13.3\par \par HUE No BD 8/12/21\par Mild OAD\par  stable pulmonary physiology\par \par PFT June 16, 2021\par Well-preserved flow rates\par Mild obstructive ventilatory impairment\par Normal lung volumes\par Air trapping with RV/TLC ratio 131% predicted\par Diffusion normal\par \par Chest x-ray PA lateral  June 16, 2021\par Normal cardiac size\par Hyper aeration increased retrosternal airspace\par Lung grossly clear lungs without parenchymal infiltrates pleural effusions or dominant pulmonary nodules\par

## 2022-02-16 NOTE — HISTORY OF PRESENT ILLNESS
[Never] : never [TextBox_4] : Pulmonary evaluation\par occ mucous not foul smelling\par Longstanding history of asthma since the age of 24\par She states that she has had dog exposure dating back to November 2020 she is young for asthma-like symptoms within allergy component\par She has had wheezing chest congestion cough\par Subsequently her Qvar that she had been previously treated with was discontinued and she was changed to Wixela continue dosing of Singulair with interval improvement of symptoms\par She states she believes in November she was treated with a course of steroids for short-term\par She is not actively wheezing\par No reported purulent sputum hemoptysis\par No fevers chills or sweats\par She denies shortness of breath dyspnea on exertion pleuritic chest pain\par She has no history of hospitalization or intubation for asthma\par Denies history of pneumonia tuberculosis latent tuberculosis interstitial lung disease obstructive sleep apnea pulmonary embolism\par She does report that she is a non-smoker but had significant secondhand tobacco smoke use\par Notes on beta-blocker for PVCs\par

## 2022-02-16 NOTE — DISCUSSION/SUMMARY
[FreeTextEntry1] : Chronic persistent asthma\par seasonal allergy\par Pulmonary physiology mild obstructive ventilatory impairment air-trapping\par Atopy elevated serum IgE level\par Vascular history as noted above\par History of secondhand passive tobacco exposure\par Rule out atopy  allergenic component\par History of PVCs on beta-blocker but as long as she demonstrates continued stable pulmonary physiology without decline on above-noted medications would not discontinue beta-blocker at this time\par Recommendations\par Blood draw for asthma profile Food IgE serum IgE level eosinophil count noted\par  current dosing of Wixela  250-50 1 puff twice daily with instructions to rinse\par Singulair 10 mg daily with food\par As needed short acting beta agonist.asthma\par Notify of any wheezing.  Notify if rescue inhaler is needed greater than 2-3 times in any week.  Avoid known triggers.\par PCV 20 as soon as  available \par \par

## 2022-02-16 NOTE — CONSULT LETTER
[Dear  ___] : Dear  [unfilled], [Consult Letter:] : I had the pleasure of evaluating your patient, [unfilled]. [Please see my note below.] : Please see my note below. [Consult Closing:] : Thank you very much for allowing me to participate in the care of this patient.  If you have any questions, please do not hesitate to contact me. [Sincerely,] : Sincerely, [FreeTextEntry3] : Abhay Brown D.O., GEMMA\par  of Medicine\par [facility] [employer]\par

## 2022-03-30 ENCOUNTER — APPOINTMENT (OUTPATIENT)
Dept: PULMONOLOGY | Facility: CLINIC | Age: 52
End: 2022-03-30
Payer: MEDICAID

## 2022-03-30 VITALS
OXYGEN SATURATION: 99 % | HEART RATE: 58 BPM | DIASTOLIC BLOOD PRESSURE: 91 MMHG | TEMPERATURE: 98.2 F | SYSTOLIC BLOOD PRESSURE: 144 MMHG

## 2022-03-30 PROCEDURE — 99214 OFFICE O/P EST MOD 30 MIN: CPT | Mod: 25

## 2022-03-30 PROCEDURE — 94010 BREATHING CAPACITY TEST: CPT

## 2022-03-30 NOTE — CONSULT LETTER
[Dear  ___] : Dear  [unfilled], [Consult Letter:] : I had the pleasure of evaluating your patient, [unfilled]. [Please see my note below.] : Please see my note below. [Consult Closing:] : Thank you very much for allowing me to participate in the care of this patient.  If you have any questions, please do not hesitate to contact me. [Sincerely,] : Sincerely, [FreeTextEntry3] : Abhay Brown D.O., GEMMA\par  of Medicine\par [facility] [employer]\par

## 2022-03-30 NOTE — PROCEDURE
[FreeTextEntry1] : Thai No BD 3/30/22\par Normal flow rates\par \par PFT 2/16/22\par Well preserved flow rates\par mild OAD\par  Lung Volumes nl\par DLCO 89 % pred\par  HGB 13.9\par \par PFT 11/12/11\par Flow rates nl\par TLC 92 % pred\par DLCO 94 %\par HGB 13.3\par \par THAI No BD 8/12/21\par Mild OAD\par  stable pulmonary physiology\par \par PFT June 16, 2021\par Well-preserved flow rates\par Mild obstructive ventilatory impairment\par Normal lung volumes\par Air trapping with RV/TLC ratio 131% predicted\par Diffusion normal\par \par Chest x-ray PA lateral  June 16, 2021\par Normal cardiac size\par Hyper aeration increased retrosternal airspace\par Lung grossly clear lungs without parenchymal infiltrates pleural effusions or dominant pulmonary nodules\par

## 2022-03-30 NOTE — HISTORY OF PRESENT ILLNESS
[Never] : never [TextBox_4] : Pulmonary evaluation\par post URI COVID negative\par occ mucous not foul smelling/ neg heme\par Longstanding history of asthma since the age of 24\par She states that she has had dog exposure dating back to November 2020 she is young for asthma-like symptoms within allergy component\par She has had wheezing chest congestion cough\par Subsequently her Qvar that she had been previously treated with was discontinued and she was changed to Wixela continue dosing of Singulair with interval improvement of symptoms\par She states she believes in November she was treated with a course of steroids for short-term\par She is not actively wheezing\par No reported purulent sputum hemoptysis\par No fevers chills or sweats\par She denies shortness of breath dyspnea on exertion pleuritic chest pain\par She has no history of hospitalization or intubation for asthma\par Denies history of pneumonia tuberculosis latent tuberculosis interstitial lung disease obstructive sleep apnea pulmonary embolism\par She does report that she is a non-smoker but had significant secondhand tobacco smoke use\par Notes on beta-blocker for PVCs\par

## 2022-05-13 ENCOUNTER — APPOINTMENT (OUTPATIENT)
Dept: PULMONOLOGY | Facility: CLINIC | Age: 52
End: 2022-05-13
Payer: MEDICAID

## 2022-05-13 VITALS
SYSTOLIC BLOOD PRESSURE: 144 MMHG | HEART RATE: 66 BPM | WEIGHT: 287 LBS | RESPIRATION RATE: 16 BRPM | OXYGEN SATURATION: 96 % | BODY MASS INDEX: 38.87 KG/M2 | DIASTOLIC BLOOD PRESSURE: 85 MMHG | HEIGHT: 72 IN | TEMPERATURE: 97.8 F

## 2022-05-13 PROCEDURE — ZZZZZ: CPT

## 2022-05-13 PROCEDURE — 94727 GAS DIL/WSHOT DETER LNG VOL: CPT

## 2022-05-13 PROCEDURE — 94729 DIFFUSING CAPACITY: CPT

## 2022-05-13 PROCEDURE — 94010 BREATHING CAPACITY TEST: CPT

## 2022-05-13 PROCEDURE — 99214 OFFICE O/P EST MOD 30 MIN: CPT | Mod: 25

## 2022-05-13 RX ORDER — FLUTICASONE PROPIONATE AND SALMETEROL 250; 50 UG/1; UG/1
250-50 POWDER RESPIRATORY (INHALATION)
Qty: 1 | Refills: 5 | Status: ACTIVE | COMMUNITY
Start: 2021-11-12

## 2022-05-13 NOTE — HISTORY OF PRESENT ILLNESS
[Never] : never [TextBox_4] : Pulmonary evaluation\par No decline resp sxs\par post URI COVID negative\par occ mucous not foul smelling/ neg heme\par Longstanding history of asthma since the age of 24\par She states that she has had dog exposure dating back to November 2020 she is young for asthma-like symptoms within allergy component\par She has had wheezing chest congestion cough\par Subsequently her Qvar that she had been previously treated with was discontinued and she was changed to Wixela continue dosing of Singulair with interval improvement of symptoms\par She states she believes in November she was treated with a course of steroids for short-term\par She is not actively wheezing\par No reported purulent sputum hemoptysis\par No fevers chills or sweats\par She denies shortness of breath dyspnea on exertion pleuritic chest pain\par She has no history of hospitalization or intubation for asthma\par Denies history of pneumonia tuberculosis latent tuberculosis interstitial lung disease obstructive sleep apnea pulmonary embolism\par She does report that she is a non-smoker but had significant secondhand tobacco smoke use\par Notes on beta-blocker for PVCs\par

## 2022-05-13 NOTE — DISCUSSION/SUMMARY
[FreeTextEntry1] : Chronic persistent asthma\par improvement of pulmonary physiology\par seasonal allergy\par Atopy elevated serum IgE level\par Vascular history as noted above\par History of secondhand passive tobacco exposure\par Rule out atopy  allergenic component\par History of PVCs on beta-blocker but as long as she demonstrates continued stable pulmonary physiology without decline on above-noted medications would not discontinue beta-blocker at this time\par Recommendations\par Blood draw for asthma profile Food IgE serum IgE level eosinophil count noted\par  current dosing of Wixela  250-50 1 puff twice daily with instructions to rinse or Advair \par Singulair 10 mg daily with food\par As needed short acting beta agonist.asthma\par Notify of any wheezing.  Notify if rescue inhaler is needed greater than 2-3 times in any week.  Avoid known triggers.\par \par \par

## 2022-05-13 NOTE — PROCEDURE
[FreeTextEntry1] : PFT 5/13/22\par Flow rates nl\par  ratio 77\par Lung Volumes nl\par  DLCO 117 % pred\par HGB 13.9\par Significant improvement flow rates\par \par [place] No BD 3/30/22\par Normal flow rates\par \par PFT 2/16/22\par Well preserved flow rates\par mild OAD\par  Lung Volumes nl\par DLCO 89 % pred\par  HGB 13.9\par \par PFT 11/12/11\par Flow rates nl\par TLC 92 % pred\par DLCO 94 %\par HGB 13.3\par \par THAI No BD 8/12/21\par Mild OAD\par  stable pulmonary physiology\par \par PFT June 16, 2021\par Well-preserved flow rates\par Mild obstructive ventilatory impairment\par Normal lung volumes\par Air trapping with RV/TLC ratio 131% predicted\par Diffusion normal\par \par Chest x-ray PA lateral  June 16, 2021\par Normal cardiac size\par Hyper aeration increased retrosternal airspace\par Lung grossly clear lungs without parenchymal infiltrates pleural effusions or dominant pulmonary nodules\par

## 2022-05-13 NOTE — CONSULT LETTER
[Dear  ___] : Dear  [unfilled], [Consult Letter:] : I had the pleasure of evaluating your patient, [unfilled]. [Please see my note below.] : Please see my note below. [Consult Closing:] : Thank you very much for allowing me to participate in the care of this patient.  If you have any questions, please do not hesitate to contact me. [Sincerely,] : Sincerely, [FreeTextEntry3] : Abhay Brown D.O., GEMMA\par  of Medicine\par [facility]\par

## 2022-06-23 ENCOUNTER — APPOINTMENT (OUTPATIENT)
Dept: PULMONOLOGY | Facility: CLINIC | Age: 52
End: 2022-06-23

## 2022-08-24 ENCOUNTER — APPOINTMENT (OUTPATIENT)
Dept: PULMONOLOGY | Facility: CLINIC | Age: 52
End: 2022-08-24

## 2022-08-24 VITALS
TEMPERATURE: 97.9 F | HEART RATE: 59 BPM | OXYGEN SATURATION: 99 % | DIASTOLIC BLOOD PRESSURE: 87 MMHG | SYSTOLIC BLOOD PRESSURE: 142 MMHG

## 2022-08-24 LAB — POCT - HEMOGLOBIN (HGB), QUANTITATIVE, TRANSCUTANEOUS: 12.2

## 2022-08-24 PROCEDURE — 94010 BREATHING CAPACITY TEST: CPT

## 2022-08-24 PROCEDURE — 94729 DIFFUSING CAPACITY: CPT

## 2022-08-24 PROCEDURE — ZZZZZ: CPT

## 2022-08-24 PROCEDURE — 99213 OFFICE O/P EST LOW 20 MIN: CPT | Mod: 25

## 2022-08-24 PROCEDURE — 88738 HGB QUANT TRANSCUTANEOUS: CPT

## 2022-08-24 PROCEDURE — 94727 GAS DIL/WSHOT DETER LNG VOL: CPT

## 2022-08-24 NOTE — PROCEDURE
[FreeTextEntry1] : PFT  8/24/22\par Well preserved flow rates\par  Minimal OAD ratio 77\par   %\par lung volumes nl\par  DLCO 100 % nl range\par  HGB 12.2\par \par PFT 5/13/22\par Flow rates nl\par  ratio 77\par Lung Volumes nl\par  DLCO 117 % pred\par HGB 13.9\par Significant improvement flow rates\par \par [place] No BD 3/30/22\par Normal flow rates\par \par PFT 2/16/22\par Well preserved flow rates\par mild OAD\par  Lung Volumes nl\par DLCO 89 % pred\par  HGB 13.9\par \par PFT 11/12/11\par Flow rates nl\par TLC 92 % pred\par DLCO 94 %\par HGB 13.3\par \par [place] No BD 8/12/21\par Mild OAD\par  stable pulmonary physiology\par \par PFT June 16, 2021\par Well-preserved flow rates\par Mild obstructive ventilatory impairment\par Normal lung volumes\par Air trapping with RV/TLC ratio 131% predicted\par Diffusion normal\par \par Chest x-ray PA lateral  June 16, 2021\par Normal cardiac size\par Hyper aeration increased retrosternal airspace\par Lung grossly clear lungs without parenchymal infiltrates pleural effusions or dominant pulmonary nodules\par

## 2022-08-24 NOTE — CONSULT LETTER
[Dear  ___] : Dear  [unfilled], [Consult Letter:] : I had the pleasure of evaluating your patient, [unfilled]. [Please see my note below.] : Please see my note below. [Consult Closing:] : Thank you very much for allowing me to participate in the care of this patient.  If you have any questions, please do not hesitate to contact me. [Sincerely,] : Sincerely, [FreeTextEntry3] : Abhay Brown D.O., GEMMA\par  of Medicine\par [facility] [employer]\par

## 2022-08-24 NOTE — DISCUSSION/SUMMARY
[FreeTextEntry1] : Chronic persistent asthma\par improvement/stable of pulmonary physiology\par seasonal allergy\par Atopy elevated serum IgE level\par Vascular history as noted above\par History of secondhand passive tobacco exposure\par Rule out atopy  allergenic component\par History of PVCs on beta-blocker but as long as she demonstrates continued stable pulmonary physiology without decline on above-noted medications would not discontinue beta-blocker at this time\par Recommendations\par Blood draw for asthma profile Food IgE serum IgE level eosinophil count noted\par  current dosing of Wixela  250-50 1 puff twice daily with instructions to rinse or Advair \par Singulair 10 mg daily with food\par As needed short acting beta agonist.asthma\par Notify of any wheezing.  Notify if rescue inhaler is needed greater than 2-3 times in any week.  Avoid known triggers.\par states cardiac ST ? Stress ECHO  pending\par \par

## 2022-08-24 NOTE — HISTORY OF PRESENT ILLNESS
[Never] : never [TextBox_4] : Pulmonary evaluation\par pos diet 25 lb weight loss\par No decline resp sxs\par post URI COVID negative\par occ mucous not foul smelling/ neg heme\par Longstanding history of asthma since the age of 24\par She states that she has had dog exposure dating back to November 2020 she is young for asthma-like symptoms within allergy component\par She has had wheezing chest congestion cough\par Subsequently her Qvar that she had been previously treated with was discontinued and she was changed to Wixela continue dosing of Singulair with interval improvement of symptoms\par She states she believes in November she was treated with a course of steroids for short-term\par She is not actively wheezing\par No reported purulent sputum hemoptysis\par No fevers chills or sweats\par She denies shortness of breath dyspnea on exertion pleuritic chest pain\par She has no history of hospitalization or intubation for asthma\par Denies history of pneumonia tuberculosis latent tuberculosis interstitial lung disease obstructive sleep apnea pulmonary embolism\par She does report that she is a non-smoker but had significant secondhand tobacco smoke use\par Notes on beta-blocker for PVCs\par

## 2022-11-18 ENCOUNTER — NON-APPOINTMENT (OUTPATIENT)
Age: 52
End: 2022-11-18

## 2022-11-23 ENCOUNTER — APPOINTMENT (OUTPATIENT)
Dept: PULMONOLOGY | Facility: CLINIC | Age: 52
End: 2022-11-23

## 2022-11-23 VITALS — OXYGEN SATURATION: 95 % | DIASTOLIC BLOOD PRESSURE: 72 MMHG | HEART RATE: 56 BPM | SYSTOLIC BLOOD PRESSURE: 127 MMHG

## 2022-11-23 PROCEDURE — 99213 OFFICE O/P EST LOW 20 MIN: CPT | Mod: 25

## 2022-11-23 PROCEDURE — 94060 EVALUATION OF WHEEZING: CPT

## 2022-11-23 NOTE — HISTORY OF PRESENT ILLNESS
Anca Sotelo is a 63year old male who presents for a pre-operative physical exam. Patient is to have left cataract surgery 10/22/2019, right cataract surgery 10/29/2019 to be done by Dr. Prem Gallo at [facility].    Prior anesthesia: yes, no p Children: Y-2. Exercise: 3 times per week. Diet: Monitors diet--no restrictions     REVIEW OF SYSTEMS:   GENERAL: feels well otherwise  SKIN: denies any unusual skin lesions  EYES:denies blurred vision or double vision- some cloudiness due to cataracts. physician, Dr. Ever Burris. 1. Pre-operative clearance    2. Combined forms of age-related cataract, bilateral    3. BMI 23.0-23.9, adult    4.  Rosacea [Never] : never [TextBox_4] : Pulmonary evaluation\par pos diet 25 lb weight loss\par No decline resp sxs\par post URI COVID negative\par occ mucous not foul smelling/ neg heme\par Longstanding history of asthma since the age of 24\par She states that she has had dog exposure dating back to November 2020 she is young for asthma-like symptoms within allergy component\par She has had wheezing chest congestion cough\par Subsequently her Qvar that she had been previously treated with was discontinued and she was changed to Wixela continue dosing of Singulair with interval improvement of symptoms\par She states she believes in November she was treated with a course of steroids for short-term\par She is not actively wheezing\par No reported purulent sputum hemoptysis\par No fevers chills or sweats\par She denies shortness of breath dyspnea on exertion pleuritic chest pain\par She has no history of hospitalization or intubation for asthma\par Denies history of pneumonia tuberculosis latent tuberculosis interstitial lung disease obstructive sleep apnea pulmonary embolism\par She does report that she is a non-smoker but had significant secondhand tobacco smoke use\par Notes on beta-blocker for PVCs\par

## 2022-11-23 NOTE — PROCEDURE
[FreeTextEntry1] : Spirometry November 23, 2022\par 40\par Ratio 79\par No response to bronchodilator at the FEV1\par \par PFT  8/24/22\par Well preserved flow rates\par  Minimal OAD ratio 77\par   %\par lung volumes nl\par  DLCO 100 % nl range\par  HGB 12.2\par \par PFT 5/13/22\par Flow rates nl\par  ratio 77\par Lung Volumes nl\par  DLCO 117 % pred\par HGB 13.9\par Significant improvement flow rates\par \par [place] No BD 3/30/22\par Normal flow rates\par \par PFT 2/16/22\par Well preserved flow rates\par mild OAD\par  Lung Volumes nl\par DLCO 89 % pred\par  HGB 13.9\par \par PFT 11/12/11\par Flow rates nl\par TLC 92 % pred\par DLCO 94 %\par HGB 13.3\par \par HUE No BD 8/12/21\par Mild OAD\par  stable pulmonary physiology\par \par PFT June 16, 2021\par Well-preserved flow rates\par Mild obstructive ventilatory impairment\par Normal lung volumes\par Air trapping with RV/TLC ratio 131% predicted\par Diffusion normal\par \par Chest x-ray PA lateral  June 16, 2021\par Normal cardiac size\par Hyper aeration increased retrosternal airspace\par Lung grossly clear lungs without parenchymal infiltrates pleural effusions or dominant pulmonary nodules\par

## 2022-11-23 NOTE — DISCUSSION/SUMMARY
[FreeTextEntry1] : Chronic persistent asthma\par improvement/stable of pulmonary physiology\par seasonal allergy\par Atopy elevated serum IgE level\par Vascular history as noted above\par History of secondhand passive tobacco exposure\par Rule out atopy  allergenic component\par History of PVCs on beta-blocker but as long as she demonstrates continued stable pulmonary physiology without decline on above-noted medications would not discontinue beta-blocker at this time\par Recommendations\par Blood draw for asthma profile Food IgE serum IgE level eosinophil count noted\par  current dosing of Wixela  250-50 1 puff twice daily with instructions to rinse or Advair \par Singulair 10 mg daily with food\par As needed short acting beta agonist.asthma\par Notify of any wheezing.  Notify if rescue inhaler is needed greater than 2-3 times in any week.  Avoid known triggers.\par states cardiac ST ? Stress ECHO  pending\par Flu up to  date Hep B  booster and\par  MMR booster and COVID Bivalent vaccine\par

## 2023-01-09 ENCOUNTER — APPOINTMENT (OUTPATIENT)
Dept: PULMONOLOGY | Facility: CLINIC | Age: 53
End: 2023-01-09
Payer: MEDICAID

## 2023-01-09 VITALS — HEART RATE: 56 BPM | DIASTOLIC BLOOD PRESSURE: 78 MMHG | OXYGEN SATURATION: 100 % | SYSTOLIC BLOOD PRESSURE: 118 MMHG

## 2023-01-09 PROCEDURE — 99213 OFFICE O/P EST LOW 20 MIN: CPT | Mod: 25

## 2023-01-09 PROCEDURE — 94010 BREATHING CAPACITY TEST: CPT

## 2023-01-09 NOTE — PROCEDURE
[FreeTextEntry1] : [place] 1/9/23\par Mild OAD\par pos interval improvement at flow rates\par \par Spirometry November 23, 2022\par Ratio 79\par No response to bronchodilator at the FEV1\par \par PFT  8/24/22\par Well preserved flow rates\par  Minimal OAD ratio 77\par   %\par lung volumes nl\par  DLCO 100 % nl range\par  HGB 12.2\par \par PFT 5/13/22\par Flow rates nl\par  ratio 77\par Lung Volumes nl\par  DLCO 117 % pred\par HGB 13.9\par Significant improvement flow rates\par \par [place] No BD 3/30/22\par Normal flow rates\par \par PFT 2/16/22\par Well preserved flow rates\par mild OAD\par  Lung Volumes nl\par DLCO 89 % pred\par  HGB 13.9\par \par PFT 11/12/11\par Flow rates nl\par TLC 92 % pred\par DLCO 94 %\par HGB 13.3\par \par THAI No BD 8/12/21\par Mild OAD\par  stable pulmonary physiology\par \par PFT June 16, 2021\par Well-preserved flow rates\par Mild obstructive ventilatory impairment\par Normal lung volumes\par Air trapping with RV/TLC ratio 131% predicted\par Diffusion normal\par \par Chest x-ray PA lateral  June 16, 2021\par Normal cardiac size\par Hyper aeration increased retrosternal airspace\par Lung grossly clear lungs without parenchymal infiltrates pleural effusions or dominant pulmonary nodules\par

## 2023-01-09 NOTE — HISTORY OF PRESENT ILLNESS
[TextBox_4] : Pulmonary evaluation\par no decline Resp  status with cold weather change\par pos diet  weight loss\par No decline resp sxs\par post URI COVID negative\par occ mucous not foul smelling/ neg heme\par Longstanding history of asthma since the age of 24\par She states that she has had dog exposure dating back to November 2020 she is young for asthma-like symptoms within allergy component\par She has had wheezing chest congestion cough\par Subsequently her Qvar that she had been previously treated with was discontinued and she was changed to Wixela continue dosing of Singulair with interval improvement of symptoms\par She states she believes in November she was treated with a course of steroids for short-term\par She is not actively wheezing\par No reported purulent sputum hemoptysis\par No fevers chills or sweats\par She denies shortness of breath dyspnea on exertion pleuritic chest pain\par She has no history of hospitalization or intubation for asthma\par Denies history of pneumonia tuberculosis latent tuberculosis interstitial lung disease obstructive sleep apnea pulmonary embolism\par She does report that she is a non-smoker but had significant secondhand tobacco smoke use\par Notes on beta-blocker for PVCs\par

## 2023-01-09 NOTE — DISCUSSION/SUMMARY
[FreeTextEntry1] : Chronic persistent asthma\par improvement/stable of pulmonary physiology\par seasonal allergy\par Atopy elevated serum IgE level\par Vascular history as noted above\par History of secondhand passive tobacco exposure\par Rule out atopy  allergenic component\par History of PVCs on beta-blocker but as long as she demonstrates continued stable pulmonary physiology without decline on above-noted medications would not discontinue beta-blocker at this time\par Recommendations\par Blood draw for asthma profile Food IgE serum IgE level eosinophil count noted\par  current dosing of Wixela  250-50 1 puff twice daily with instructions to rinse or Advair \par Singulair 10 mg daily with food\par As needed short acting beta agonist.asthma\par Notify of any wheezing.  Notify if rescue inhaler is needed greater than 2-3 times in any week.  Avoid known triggers.\par states cardiac ST ? Stress ECHO  neg\par Flu up to  date Hep B  booster and\par  MMR booster and COVID Bivalent vaccine\par

## 2023-01-09 NOTE — CONSULT LETTER
[Dear  ___] : Dear  [unfilled], [Consult Letter:] : I had the pleasure of evaluating your patient, [unfilled]. [Please see my note below.] : Please see my note below. [Consult Closing:] : Thank you very much for allowing me to participate in the care of this patient.  If you have any questions, please do not hesitate to contact me. [Sincerely,] : Sincerely, [FreeTextEntry3] : Abhay Brown D.O., GEMMA\par  of Medicine\par [facility] [employer]\par

## 2023-02-23 ENCOUNTER — APPOINTMENT (OUTPATIENT)
Dept: PULMONOLOGY | Facility: CLINIC | Age: 53
End: 2023-02-23
Payer: MEDICAID

## 2023-02-23 VITALS — DIASTOLIC BLOOD PRESSURE: 84 MMHG | OXYGEN SATURATION: 100 % | SYSTOLIC BLOOD PRESSURE: 129 MMHG | HEART RATE: 54 BPM

## 2023-02-23 PROCEDURE — 99214 OFFICE O/P EST MOD 30 MIN: CPT | Mod: 25

## 2023-02-23 PROCEDURE — 94060 EVALUATION OF WHEEZING: CPT

## 2023-02-23 NOTE — PROCEDURE
[FreeTextEntry1] : Spirometry February 23, 2023\par Mild obstructive ventilatory impairment\par Stable flow rates\par No decline in overall spirometric data\par \par [place] 1/9/23\par Mild OAD\par pos interval improvement at flow rates\par \par Spirometry November 23, 2022\par Ratio 79\par No response to bronchodilator at the FEV1\par \par PFT  8/24/22\par Well preserved flow rates\par  Minimal OAD ratio 77\par   %\par lung volumes nl\par  DLCO 100 % nl range\par  HGB 12.2\par \par PFT 5/13/22\par Flow rates nl\par  ratio 77\par Lung Volumes nl\par  DLCO 117 % pred\par HGB 13.9\par Significant improvement flow rates\par \par [place] No BD 3/30/22\par Normal flow rates\par \par PFT 2/16/22\par Well preserved flow rates\par mild OAD\par  Lung Volumes nl\par DLCO 89 % pred\par  HGB 13.9\par \par PFT 11/12/11\par Flow rates nl\par TLC 92 % pred\par DLCO 94 %\par HGB 13.3\par \par THAI No BD 8/12/21\par Mild OAD\par  stable pulmonary physiology\par \par PFT June 16, 2021\par Well-preserved flow rates\par Mild obstructive ventilatory impairment\par Normal lung volumes\par Air trapping with RV/TLC ratio 131% predicted\par Diffusion normal\par \par Chest x-ray PA lateral  June 16, 2021\par Normal cardiac size\par Hyper aeration increased retrosternal airspace\par Lung grossly clear lungs without parenchymal infiltrates pleural effusions or dominant pulmonary nodules\par

## 2023-02-23 NOTE — CONSULT LETTER
[Dear  ___] : Dear  [unfilled], [Consult Letter:] : I had the pleasure of evaluating your patient, [unfilled]. [Please see my note below.] : Please see my note below. [Consult Closing:] : Thank you very much for allowing me to participate in the care of this patient.  If you have any questions, please do not hesitate to contact me. [Sincerely,] : Sincerely, [FreeTextEntry3] : Abhay Brown D.O., GEMMA\par  of Medicine\par [facility] [employer]\par

## 2023-03-29 ENCOUNTER — RX RENEWAL (OUTPATIENT)
Age: 53
End: 2023-03-29

## 2023-04-04 ENCOUNTER — FORM ENCOUNTER (OUTPATIENT)
Age: 53
End: 2023-04-04

## 2023-04-06 ENCOUNTER — APPOINTMENT (OUTPATIENT)
Dept: PULMONOLOGY | Facility: CLINIC | Age: 53
End: 2023-04-06
Payer: MEDICAID

## 2023-04-06 VITALS
OXYGEN SATURATION: 100 % | SYSTOLIC BLOOD PRESSURE: 147 MMHG | HEART RATE: 62 BPM | DIASTOLIC BLOOD PRESSURE: 87 MMHG | RESPIRATION RATE: 16 BRPM

## 2023-04-06 DIAGNOSIS — J30.81 ALLERGIC RHINITIS DUE TO ANIMAL (CAT) (DOG) HAIR AND DANDER: ICD-10-CM

## 2023-04-06 PROCEDURE — 99214 OFFICE O/P EST MOD 30 MIN: CPT | Mod: 25

## 2023-04-06 PROCEDURE — 94060 EVALUATION OF WHEEZING: CPT

## 2023-04-06 NOTE — DISCUSSION/SUMMARY
[FreeTextEntry1] : Chronic persistent asthma\par improvement/stable of pulmonary physiology\par seasonal allergy\par Atopy elevated serum IgE level\par Vascular history as noted above\par History of secondhand passive tobacco exposure\par Rule out atopy  allergenic component\par History of PVCs on beta-blocker but as long as she demonstrates continued stable pulmonary physiology without decline on above-noted medications would not discontinue beta-blocker at this time\par Recommendations\par Blood draw for asthma profile Food IgE serum IgE level eosinophil count noted\par  current dosing of Wixela  250-50 1 puff twice daily with instructions to rinse or Advair \par Singulair 10 mg daily with food\par As needed short acting beta agonist.asthma\par Notify of any wheezing.  Notify if rescue inhaler is needed greater than 2-3 times in any week.  Avoid known triggers.\par states cardiac ST ? Stress ECHO  neg\par Flu up to  date Hep B  booster and\par  MMR booster and COVID Bivalent vaccine\par f/u CXR\par

## 2023-04-06 NOTE — CONSULT LETTER
[Dear  ___] : Dear  [unfilled], [Consult Letter:] : I had the pleasure of evaluating your patient, [unfilled]. [Please see my note below.] : Please see my note below. [Consult Closing:] : Thank you very much for allowing me to participate in the care of this patient.  If you have any questions, please do not hesitate to contact me. [Sincerely,] : Sincerely, [FreeTextEntry3] : Abhay Brown D.O., GEMMA\par  of Medicine\par [facility] [employer]\par

## 2023-05-15 ENCOUNTER — APPOINTMENT (OUTPATIENT)
Dept: PULMONOLOGY | Facility: CLINIC | Age: 53
End: 2023-05-15
Payer: MEDICAID

## 2023-05-15 VITALS — HEART RATE: 60 BPM | WEIGHT: 274 LBS | OXYGEN SATURATION: 99 % | HEIGHT: 72 IN | BODY MASS INDEX: 37.11 KG/M2

## 2023-05-15 LAB — POCT - HEMOGLOBIN (HGB), QUANTITATIVE, TRANSCUTANEOUS: 13.3

## 2023-05-15 PROCEDURE — 94060 EVALUATION OF WHEEZING: CPT

## 2023-05-15 PROCEDURE — 71046 X-RAY EXAM CHEST 2 VIEWS: CPT

## 2023-05-15 PROCEDURE — 99214 OFFICE O/P EST MOD 30 MIN: CPT | Mod: 25

## 2023-05-15 PROCEDURE — 88738 HGB QUANT TRANSCUTANEOUS: CPT

## 2023-05-15 NOTE — PROCEDURE
[FreeTextEntry1] : Spirometry May 15, 2023\par Minimal obstructive ventilatory impairment\par No bronchodilator response at FEV1\par Stable flow rates\par \par Chest x-ray PA lateral\par Cardiac size grossly normal\par Lung fields are clear\par No parenchymal infiltrate pleural effusions with dominant pulmonary nodules\par Soft tissue bony structures unremarkable\par Mediastinum unremarkable\par Impression clear lungs\par \par \par [place] 4/6/23\par Flow  rates nl without  decline\par Minimal OAD\par \par Spirometry February 23, 2023\par Mild obstructive ventilatory impairment\par Stable flow rates\par No decline in overall spirometric data\par \par [place] 1/9/23\par Mild OAD\par pos interval improvement at flow rates\par \par Spirometry November 23, 2022\par Ratio 79\par No response to bronchodilator at the FEV1\par \par PFT  8/24/22\par Well preserved flow rates\par  Minimal OAD ratio 77\par   %\par lung volumes nl\par  DLCO 100 % nl range\par  HGB 12.2\par \par PFT 5/13/22\par Flow rates nl\par  ratio 77\par Lung Volumes nl\par  DLCO 117 % pred\par HGB 13.9\par Significant improvement flow rates\par \par [place] No BD 3/30/22\par Normal flow rates\par \par PFT 2/16/22\par Well preserved flow rates\par mild OAD\par  Lung Volumes nl\par DLCO 89 % pred\par  HGB 13.9\par \par PFT 11/12/11\par Flow rates nl\par TLC 92 % pred\par DLCO 94 %\par HGB 13.3\par \par THAI No BD 8/12/21\par Mild OAD\par  stable pulmonary physiology\par \par PFT June 16, 2021\par Well-preserved flow rates\par Mild obstructive ventilatory impairment\par Normal lung volumes\par Air trapping with RV/TLC ratio 131% predicted\par Diffusion normal\par \par Chest x-ray PA lateral  June 16, 2021\par Normal cardiac size\par Hyper aeration increased retrosternal airspace\par Lung grossly clear lungs without parenchymal infiltrates pleural effusions or dominant pulmonary nodules\par

## 2023-05-15 NOTE — DISCUSSION/SUMMARY
[FreeTextEntry1] : Chronic persistent asthma\par improvement/stable of pulmonary physiology\par seasonal allergy\par Atopy elevated serum IgE level\par Vascular history as noted above\par History of secondhand passive tobacco exposure\par Rule out atopy  allergenic component\par History of PVCs on beta-blocker but as long as she demonstrates continued stable pulmonary physiology without decline on above-noted medications would not discontinue beta-blocker at this time\par Recommendations\par Blood draw for asthma profile Food IgE serum IgE level eosinophil count noted\par  current dosing of Wixela  250-50 1 puff twice daily with instructions to rinse or Advair \par Singulair 10 mg daily with food\par As needed short acting beta agonist.asthma\par Notify of any wheezing.  Notify if rescue inhaler is needed greater than 2-3 times in any week.  Avoid known triggers.\par states cardiac ST ? Stress ECHO  neg\par Flu up to  date Hep B  booster and\par  MMR booster and COVID Bivalent vaccine\par Noted off-white XLIF but changed to the Advair discus 250-51 puff twice daily with instructions to rinse\par \par

## 2023-05-15 NOTE — CONSULT LETTER
[Dear  ___] : Dear  [unfilled], [Consult Letter:] : I had the pleasure of evaluating your patient, [unfilled]. [Please see my note below.] : Please see my note below. [Consult Closing:] : Thank you very much for allowing me to participate in the care of this patient.  If you have any questions, please do not hesitate to contact me. [Sincerely,] : Sincerely, [FreeTextEntry3] : Abhay Brown D.O., GEMMA\par  of Medicine\par [facility] [employer]\par

## 2023-05-15 NOTE — HISTORY OF PRESENT ILLNESS
[Never] : never [TextBox_4] : Pulmonary evaluation\par no decline Resp  status with cold weather change\par pos diet  weight loss\par No decline resp sxs\par post URI COVID negative\par occ mucous not foul smelling/ neg heme\par Longstanding history of asthma since the age of 24\par She states that she has had dog exposure dating back to November 2020 she is young for asthma-like symptoms within allergy component\par She has had wheezing chest congestion cough\par Subsequently her Qvar that she had been previously treated with was discontinued and she was changed to Wixela continue dosing of Singulair with interval improvement of symptoms\par She states she believes in November she was treated with a course of steroids for short-term\par She is not actively wheezing\par No reported purulent sputum hemoptysis\par No fevers chills or sweats\par She denies shortness of breath dyspnea on exertion pleuritic chest pain\par She has no history of hospitalization or intubation for asthma\par Denies history of pneumonia tuberculosis latent tuberculosis interstitial lung disease obstructive sleep apnea pulmonary embolism\par She does report that she is a non-smoker but had significant secondhand tobacco smoke use\par Notes on beta-blocker for PVCs\par

## 2023-06-30 ENCOUNTER — APPOINTMENT (OUTPATIENT)
Dept: PULMONOLOGY | Facility: CLINIC | Age: 53
End: 2023-06-30
Payer: MEDICAID

## 2023-06-30 VITALS — HEART RATE: 57 BPM | SYSTOLIC BLOOD PRESSURE: 120 MMHG | DIASTOLIC BLOOD PRESSURE: 73 MMHG | OXYGEN SATURATION: 98 %

## 2023-06-30 PROCEDURE — 99214 OFFICE O/P EST MOD 30 MIN: CPT | Mod: 25

## 2023-06-30 PROCEDURE — 94060 EVALUATION OF WHEEZING: CPT

## 2023-06-30 RX ORDER — MONTELUKAST 10 MG/1
10 TABLET, FILM COATED ORAL
Qty: 90 | Refills: 1 | Status: ACTIVE | COMMUNITY
Start: 2022-05-13 | End: 1900-01-01

## 2023-06-30 NOTE — DISCUSSION/SUMMARY
[FreeTextEntry1] : Chronic persistent asthma\par poor air quality addressed\par improvement/stable of pulmonary physiology\par seasonal allergy\par Atopy elevated serum IgE level\par Vascular history as noted above\par History of secondhand passive tobacco exposure\par Rule out atopy  allergenic component\par History of PVCs on beta-blocker but as long as she demonstrates continued stable pulmonary physiology without decline on above-noted medications would not discontinue beta-blocker at this time\par Recommendations\par Blood draw for asthma profile Food IgE serum IgE level eosinophil count noted\par  current dosing of Wixela  250-50 1 puff twice daily with instructions to rinse or Advair \par Singulair 10 mg daily with food\par As needed short acting beta agonist.asthma\par Notify of any wheezing.  Notify if rescue inhaler is needed greater than 2-3 times in any week.  Avoid known triggers.\par states cardiac ST ? Stress ECHO  neg\par Flu up to  date Hep B  booster and\par  MMR booster and COVID Bivalent vaccine\par  changed to the Advair discus 250-51 puff twice daily with instructions to rinse\par \par

## 2023-06-30 NOTE — CONSULT LETTER
[Dear  ___] : Dear  [unfilled], [Consult Letter:] : I had the pleasure of evaluating your patient, [unfilled]. [Please see my note below.] : Please see my note below. [Consult Closing:] : Thank you very much for allowing me to participate in the care of this patient.  If you have any questions, please do not hesitate to contact me. [Sincerely,] : Sincerely, [FreeTextEntry3] : Abhay Brown D.O., GEMMA\par  of Medicine\par [facility]\par

## 2023-06-30 NOTE — PHYSICAL EXAM
[No Acute Distress] : no acute distress [Normal Oropharynx] : normal oropharynx [II] : Mallampati Class: II [Supple] : supple [Normal Appearance] : normal appearance [No Neck Mass] : no neck mass [No JVD] : no jvd [Normal Rate/Rhythm] : normal rate/rhythm [Normal Pulses] : normal pulses [Normal PMI] : normal pmi [Normal S1, S2] : normal s1, s2 [No Murmurs] : no murmurs [No Rubs] : no rubs [No Gallops] : no gallops [No Acc Muscle Use] : no acc muscle use [No Resp Distress] : no resp distress [Normal Palpation] : normal palpation [Normal Rhythm and Effort] : normal rhythm and effort [Clear to Auscultation Bilaterally] : clear to auscultation bilaterally [No Abnormalities] : no abnormalities [Benign] : benign [Not Tender] : not tender [Soft] : soft [No HSM] : no hsm [Normal Bowel Sounds] : normal bowel sounds [Normal Gait] : normal gait [No Clubbing] : no clubbing [No Cyanosis] : no cyanosis [No Edema] : no edema [FROM] : FROM [Normal Color/ Pigmentation] : normal color/ pigmentation [No Focal Deficits] : no focal deficits [Oriented x3] : oriented x3 [Normal Mood] : normal mood [Normal Affect] : normal affect [TextBox_2] : Overweight

## 2023-06-30 NOTE — PROCEDURE
[FreeTextEntry1] : Spirometry 6/30/23\par Flow  rates nl\par  Mild OAD\par mild   decline\par \par Spirometry May 15, 2023\par Minimal obstructive ventilatory impairment\par No bronchodilator response at FEV1\par Stable flow rates\par \par Chest x-ray PA lateral May 15 2023\par Cardiac size grossly normal\par Lung fields are clear\par No parenchymal infiltrate pleural effusions with dominant pulmonary nodules\par Soft tissue bony structures unremarkable\par Mediastinum unremarkable\par Impression clear lungs\par \par \par [place] 4/6/23\par Flow  rates nl without  decline\par Minimal OAD\par \par Spirometry February 23, 2023\par Mild obstructive ventilatory impairment\par Stable flow rates\par No decline in overall spirometric data\par \par [place] 1/9/23\par Mild OAD\par pos interval improvement at flow rates\par \par Spirometry November 23, 2022\par Ratio 79\par No response to bronchodilator at the FEV1\par \par PFT  8/24/22\par Well preserved flow rates\par  Minimal OAD ratio 77\par   %\par lung volumes nl\par  DLCO 100 % nl range\par  HGB 12.2\par \par PFT 5/13/22\par Flow rates nl\par  ratio 77\par Lung Volumes nl\par  DLCO 117 % pred\par HGB 13.9\par Significant improvement flow rates\par \par [place] No BD 3/30/22\par Normal flow rates\par \par PFT 2/16/22\par Well preserved flow rates\par mild OAD\par  Lung Volumes nl\par DLCO 89 % pred\par  HGB 13.9\par \par PFT 11/12/11\par Flow rates nl\par TLC 92 % pred\par DLCO 94 %\par HGB 13.3\par \par THAI No BD 8/12/21\par Mild OAD\par  stable pulmonary physiology\par \par PFT June 16, 2021\par Well-preserved flow rates\par Mild obstructive ventilatory impairment\par Normal lung volumes\par Air trapping with RV/TLC ratio 131% predicted\par Diffusion normal\par \par Chest x-ray PA lateral  June 16, 2021\par Normal cardiac size\par Hyper aeration increased retrosternal airspace\par Lung grossly clear lungs without parenchymal infiltrates pleural effusions or dominant pulmonary nodules\par

## 2023-08-09 ENCOUNTER — NON-APPOINTMENT (OUTPATIENT)
Age: 53
End: 2023-08-09

## 2023-08-11 ENCOUNTER — APPOINTMENT (OUTPATIENT)
Dept: PULMONOLOGY | Facility: CLINIC | Age: 53
End: 2023-08-11
Payer: MEDICAID

## 2023-08-11 VITALS — HEART RATE: 63 BPM | SYSTOLIC BLOOD PRESSURE: 127 MMHG | DIASTOLIC BLOOD PRESSURE: 84 MMHG | OXYGEN SATURATION: 98 %

## 2023-08-11 DIAGNOSIS — U07.1 COVID-19: ICD-10-CM

## 2023-08-11 PROCEDURE — 99214 OFFICE O/P EST MOD 30 MIN: CPT | Mod: 25

## 2023-08-11 PROCEDURE — 71046 X-RAY EXAM CHEST 2 VIEWS: CPT

## 2023-08-11 NOTE — PROCEDURE
[FreeTextEntry1] : Chest x-ray PA lateral 8/11/2023 Cardiac size normal Clear lung fields No parenchymal infiltrate pleural effusions dominant pulmonary nodules Soft tissue bony structures unremarkable Jess mediastinum unremarkable Post COVID infection no evidence of pulmonary parenchymal disease   Spirometry 6/30/23 Flow  rates nl  Mild OAD mild   decline  Spirometry May 15, 2023 Minimal obstructive ventilatory impairment No bronchodilator response at FEV1 Stable flow rates  Chest x-ray PA lateral May 15 2023 Cardiac size grossly normal Lung fields are clear No parenchymal infiltrate pleural effusions with dominant pulmonary nodules Soft tissue bony structures unremarkable Mediastinum unremarkable Impression clear lungs   [place] 4/6/23 Flow  rates nl without  decline Minimal OAD  Spirometry February 23, 2023 Mild obstructive ventilatory impairment Stable flow rates No decline in overall spirometric data  [place] 1/9/23 Mild OAD pos interval improvement at flow rates  Spirometry November 23, 2022 Ratio 79 No response to bronchodilator at the FEV1  PFT  8/24/22 Well preserved flow rates  Minimal OAD ratio 77   % lung volumes nl  DLCO 100 % nl range  HGB 12.2  PFT 5/13/22 Flow rates nl  ratio 77 Lung Volumes nl  DLCO 117 % pred HGB 13.9 Significant improvement flow rates  [place] No BD 3/30/22 Normal flow rates  PFT 2/16/22 Well preserved flow rates mild OAD  Lung Volumes nl DLCO 89 % pred  HGB 13.9  PFT 11/12/11 Flow rates nl TLC 92 % pred DLCO 94 % HGB 13.3  [place] No BD 8/12/21 Mild OAD  stable pulmonary physiology  PFT June 16, 2021 Well-preserved flow rates Mild obstructive ventilatory impairment Normal lung volumes Air trapping with RV/TLC ratio 131% predicted Diffusion normal  Chest x-ray PA lateral  June 16, 2021 Normal cardiac size Hyper aeration increased retrosternal airspace Lung grossly clear lungs without parenchymal infiltrates pleural effusions or dominant pulmonary nodules

## 2023-08-11 NOTE — DISCUSSION/SUMMARY
[FreeTextEntry1] : Post COVID infection July 24 203- f/u visit PFT Chronic persistent asthma poor air quality addressed improvement/stable of pulmonary physiology seasonal allergy Atopy elevated serum IgE level Vascular history as noted above History of secondhand passive tobacco exposure Rule out atopy  allergenic component History of PVCs on beta-blocker but as long as she demonstrates continued stable pulmonary physiology without decline on above-noted medications would not discontinue beta-blocker at this time Recommendations Blood draw for asthma profile Food IgE serum IgE level eosinophil count noted  current dosing of Wixela  250-50 1 puff twice daily with instructions to rinse or Advair  Singulair 10 mg daily with food As needed short acting beta agonist.asthma Notify of any wheezing.  Notify if rescue inhaler is needed greater than 2-3 times in any week.  Avoid known triggers. states cardiac ST ? Stress ECHO  neg Flu up to  date Hep B  booster and  MMR booster and COVID Bivalent vaccine  changed to the Advair discus 250-51 puff twice daily with instructions to rinse

## 2023-08-11 NOTE — HISTORY OF PRESENT ILLNESS
[Never] : never [TextBox_4] : Pulmonary evaluation Post COVID infection July 24 - completed Paxlovid  no significant chest sxs just still with some  throat discomfort sxs flu like HA cough low  grade fever  no decline Resp  status with cold weather change pos diet  weight loss No decline resp sxs post URI COVID negative occ mucous not foul smelling/ neg heme Longstanding history of asthma since the age of 24 She states that she has had dog exposure dating back to November 2020 she is young for asthma-like symptoms within allergy component She has had wheezing chest congestion cough Subsequently her Qvar that she had been previously treated with was discontinued and she was changed to Wixela continue dosing of Singulair with interval improvement of symptoms She states she believes in November she was treated with a course of steroids for short-term She is not actively wheezing No reported purulent sputum hemoptysis No fevers chills or sweats She denies shortness of breath dyspnea on exertion pleuritic chest pain She has no history of hospitalization or intubation for asthma Denies history of pneumonia tuberculosis latent tuberculosis interstitial lung disease obstructive sleep apnea pulmonary embolism She does report that she is a non-smoker but had significant secondhand tobacco smoke use Notes on beta-blocker for PVCs

## 2023-08-11 NOTE — CONSULT LETTER
[Dear  ___] : Dear  [unfilled], [Consult Letter:] : I had the pleasure of evaluating your patient, [unfilled]. [Please see my note below.] : Please see my note below. [Consult Closing:] : Thank you very much for allowing me to participate in the care of this patient.  If you have any questions, please do not hesitate to contact me. [Sincerely,] : Sincerely, [FreeTextEntry3] : Abhay Brown D.O., GEMMA\par   of Medicine\par  [facility] [employer]\par

## 2023-09-25 ENCOUNTER — APPOINTMENT (OUTPATIENT)
Dept: PULMONOLOGY | Facility: CLINIC | Age: 53
End: 2023-09-25

## 2023-10-19 ENCOUNTER — INPATIENT (INPATIENT)
Facility: HOSPITAL | Age: 53
LOS: 7 days | Discharge: HOME | End: 2023-10-27
Attending: INTERNAL MEDICINE | Admitting: INTERNAL MEDICINE
Payer: MEDICAID

## 2023-10-19 VITALS
RESPIRATION RATE: 20 BRPM | OXYGEN SATURATION: 95 % | SYSTOLIC BLOOD PRESSURE: 125 MMHG | DIASTOLIC BLOOD PRESSURE: 76 MMHG | HEART RATE: 99 BPM | TEMPERATURE: 101 F

## 2023-10-19 DIAGNOSIS — J90 PLEURAL EFFUSION, NOT ELSEWHERE CLASSIFIED: ICD-10-CM

## 2023-10-19 LAB
ALBUMIN SERPL ELPH-MCNC: 2.8 G/DL — LOW (ref 3.3–5)
ALBUMIN SERPL ELPH-MCNC: 2.8 G/DL — LOW (ref 3.3–5)
ALP SERPL-CCNC: 181 U/L — HIGH (ref 40–120)
ALP SERPL-CCNC: 181 U/L — HIGH (ref 40–120)
ALT FLD-CCNC: 40 U/L — HIGH (ref 4–33)
ALT FLD-CCNC: 40 U/L — HIGH (ref 4–33)
ANION GAP SERPL CALC-SCNC: 17 MMOL/L — HIGH (ref 7–14)
ANION GAP SERPL CALC-SCNC: 17 MMOL/L — HIGH (ref 7–14)
AST SERPL-CCNC: 47 U/L — HIGH (ref 4–32)
AST SERPL-CCNC: 47 U/L — HIGH (ref 4–32)
B PERT DNA SPEC QL NAA+PROBE: SIGNIFICANT CHANGE UP
B PERT DNA SPEC QL NAA+PROBE: SIGNIFICANT CHANGE UP
B PERT+PARAPERT DNA PNL SPEC NAA+PROBE: SIGNIFICANT CHANGE UP
B PERT+PARAPERT DNA PNL SPEC NAA+PROBE: SIGNIFICANT CHANGE UP
BASE EXCESS BLDV CALC-SCNC: 4 MMOL/L — HIGH (ref -2–3)
BASE EXCESS BLDV CALC-SCNC: 4 MMOL/L — HIGH (ref -2–3)
BASE EXCESS BLDV CALC-SCNC: 4.6 MMOL/L — HIGH (ref -2–3)
BASE EXCESS BLDV CALC-SCNC: 4.6 MMOL/L — HIGH (ref -2–3)
BASOPHILS # BLD AUTO: 0.06 K/UL — SIGNIFICANT CHANGE UP (ref 0–0.2)
BASOPHILS # BLD AUTO: 0.06 K/UL — SIGNIFICANT CHANGE UP (ref 0–0.2)
BASOPHILS NFR BLD AUTO: 0.3 % — SIGNIFICANT CHANGE UP (ref 0–2)
BASOPHILS NFR BLD AUTO: 0.3 % — SIGNIFICANT CHANGE UP (ref 0–2)
BILIRUB SERPL-MCNC: 2.1 MG/DL — HIGH (ref 0.2–1.2)
BILIRUB SERPL-MCNC: 2.1 MG/DL — HIGH (ref 0.2–1.2)
BLOOD GAS VENOUS COMPREHENSIVE RESULT: SIGNIFICANT CHANGE UP
BORDETELLA PARAPERTUSSIS (RAPRVP): SIGNIFICANT CHANGE UP
BORDETELLA PARAPERTUSSIS (RAPRVP): SIGNIFICANT CHANGE UP
BUN SERPL-MCNC: 7 MG/DL — SIGNIFICANT CHANGE UP (ref 7–23)
BUN SERPL-MCNC: 7 MG/DL — SIGNIFICANT CHANGE UP (ref 7–23)
C PNEUM DNA SPEC QL NAA+PROBE: SIGNIFICANT CHANGE UP
C PNEUM DNA SPEC QL NAA+PROBE: SIGNIFICANT CHANGE UP
CALCIUM SERPL-MCNC: 9.3 MG/DL — SIGNIFICANT CHANGE UP (ref 8.4–10.5)
CALCIUM SERPL-MCNC: 9.3 MG/DL — SIGNIFICANT CHANGE UP (ref 8.4–10.5)
CHLORIDE BLDV-SCNC: 94 MMOL/L — LOW (ref 96–108)
CHLORIDE SERPL-SCNC: 92 MMOL/L — LOW (ref 98–107)
CHLORIDE SERPL-SCNC: 92 MMOL/L — LOW (ref 98–107)
CO2 BLDV-SCNC: 28.5 MMOL/L — HIGH (ref 22–26)
CO2 BLDV-SCNC: 28.5 MMOL/L — HIGH (ref 22–26)
CO2 BLDV-SCNC: 29.5 MMOL/L — HIGH (ref 22–26)
CO2 BLDV-SCNC: 29.5 MMOL/L — HIGH (ref 22–26)
CO2 SERPL-SCNC: 22 MMOL/L — SIGNIFICANT CHANGE UP (ref 22–31)
CO2 SERPL-SCNC: 22 MMOL/L — SIGNIFICANT CHANGE UP (ref 22–31)
CREAT SERPL-MCNC: 0.59 MG/DL — SIGNIFICANT CHANGE UP (ref 0.5–1.3)
CREAT SERPL-MCNC: 0.59 MG/DL — SIGNIFICANT CHANGE UP (ref 0.5–1.3)
EGFR: 108 ML/MIN/1.73M2 — SIGNIFICANT CHANGE UP
EGFR: 108 ML/MIN/1.73M2 — SIGNIFICANT CHANGE UP
EOSINOPHIL # BLD AUTO: 0.03 K/UL — SIGNIFICANT CHANGE UP (ref 0–0.5)
EOSINOPHIL # BLD AUTO: 0.03 K/UL — SIGNIFICANT CHANGE UP (ref 0–0.5)
EOSINOPHIL NFR BLD AUTO: 0.2 % — SIGNIFICANT CHANGE UP (ref 0–6)
EOSINOPHIL NFR BLD AUTO: 0.2 % — SIGNIFICANT CHANGE UP (ref 0–6)
FLUAV SUBTYP SPEC NAA+PROBE: SIGNIFICANT CHANGE UP
FLUAV SUBTYP SPEC NAA+PROBE: SIGNIFICANT CHANGE UP
FLUBV RNA SPEC QL NAA+PROBE: SIGNIFICANT CHANGE UP
FLUBV RNA SPEC QL NAA+PROBE: SIGNIFICANT CHANGE UP
GAS PNL BLDV: 129 MMOL/L — LOW (ref 136–145)
GAS PNL BLDV: 129 MMOL/L — LOW (ref 136–145)
GAS PNL BLDV: 130 MMOL/L — LOW (ref 136–145)
GAS PNL BLDV: 130 MMOL/L — LOW (ref 136–145)
GLUCOSE BLDV-MCNC: 101 MG/DL — HIGH (ref 70–99)
GLUCOSE BLDV-MCNC: 101 MG/DL — HIGH (ref 70–99)
GLUCOSE BLDV-MCNC: 93 MG/DL — SIGNIFICANT CHANGE UP (ref 70–99)
GLUCOSE BLDV-MCNC: 93 MG/DL — SIGNIFICANT CHANGE UP (ref 70–99)
GLUCOSE SERPL-MCNC: 93 MG/DL — SIGNIFICANT CHANGE UP (ref 70–99)
GLUCOSE SERPL-MCNC: 93 MG/DL — SIGNIFICANT CHANGE UP (ref 70–99)
HADV DNA SPEC QL NAA+PROBE: SIGNIFICANT CHANGE UP
HADV DNA SPEC QL NAA+PROBE: SIGNIFICANT CHANGE UP
HCO3 BLDV-SCNC: 27 MMOL/L — SIGNIFICANT CHANGE UP (ref 22–29)
HCO3 BLDV-SCNC: 27 MMOL/L — SIGNIFICANT CHANGE UP (ref 22–29)
HCO3 BLDV-SCNC: 28 MMOL/L — SIGNIFICANT CHANGE UP (ref 22–29)
HCO3 BLDV-SCNC: 28 MMOL/L — SIGNIFICANT CHANGE UP (ref 22–29)
HCOV 229E RNA SPEC QL NAA+PROBE: SIGNIFICANT CHANGE UP
HCOV 229E RNA SPEC QL NAA+PROBE: SIGNIFICANT CHANGE UP
HCOV HKU1 RNA SPEC QL NAA+PROBE: SIGNIFICANT CHANGE UP
HCOV HKU1 RNA SPEC QL NAA+PROBE: SIGNIFICANT CHANGE UP
HCOV NL63 RNA SPEC QL NAA+PROBE: SIGNIFICANT CHANGE UP
HCOV NL63 RNA SPEC QL NAA+PROBE: SIGNIFICANT CHANGE UP
HCOV OC43 RNA SPEC QL NAA+PROBE: SIGNIFICANT CHANGE UP
HCOV OC43 RNA SPEC QL NAA+PROBE: SIGNIFICANT CHANGE UP
HCT VFR BLD CALC: 34.1 % — LOW (ref 34.5–45)
HCT VFR BLD CALC: 34.1 % — LOW (ref 34.5–45)
HCT VFR BLDA CALC: 34 % — LOW (ref 34.5–46.5)
HCT VFR BLDA CALC: 34 % — LOW (ref 34.5–46.5)
HCT VFR BLDA CALC: 35 % — SIGNIFICANT CHANGE UP (ref 34.5–46.5)
HCT VFR BLDA CALC: 35 % — SIGNIFICANT CHANGE UP (ref 34.5–46.5)
HGB BLD CALC-MCNC: 11.2 G/DL — LOW (ref 11.7–16.1)
HGB BLD CALC-MCNC: 11.2 G/DL — LOW (ref 11.7–16.1)
HGB BLD CALC-MCNC: 11.7 G/DL — SIGNIFICANT CHANGE UP (ref 11.7–16.1)
HGB BLD CALC-MCNC: 11.7 G/DL — SIGNIFICANT CHANGE UP (ref 11.7–16.1)
HGB BLD-MCNC: 11.2 G/DL — LOW (ref 11.5–15.5)
HGB BLD-MCNC: 11.2 G/DL — LOW (ref 11.5–15.5)
HMPV RNA SPEC QL NAA+PROBE: SIGNIFICANT CHANGE UP
HMPV RNA SPEC QL NAA+PROBE: SIGNIFICANT CHANGE UP
HPIV1 RNA SPEC QL NAA+PROBE: SIGNIFICANT CHANGE UP
HPIV1 RNA SPEC QL NAA+PROBE: SIGNIFICANT CHANGE UP
HPIV2 RNA SPEC QL NAA+PROBE: SIGNIFICANT CHANGE UP
HPIV2 RNA SPEC QL NAA+PROBE: SIGNIFICANT CHANGE UP
HPIV3 RNA SPEC QL NAA+PROBE: SIGNIFICANT CHANGE UP
HPIV3 RNA SPEC QL NAA+PROBE: SIGNIFICANT CHANGE UP
HPIV4 RNA SPEC QL NAA+PROBE: SIGNIFICANT CHANGE UP
HPIV4 RNA SPEC QL NAA+PROBE: SIGNIFICANT CHANGE UP
IANC: 15.45 K/UL — HIGH (ref 1.8–7.4)
IANC: 15.45 K/UL — HIGH (ref 1.8–7.4)
IMM GRANULOCYTES NFR BLD AUTO: 0.9 % — SIGNIFICANT CHANGE UP (ref 0–0.9)
IMM GRANULOCYTES NFR BLD AUTO: 0.9 % — SIGNIFICANT CHANGE UP (ref 0–0.9)
LACTATE BLDV-MCNC: 1 MMOL/L — SIGNIFICANT CHANGE UP (ref 0.5–2)
LACTATE BLDV-MCNC: 1 MMOL/L — SIGNIFICANT CHANGE UP (ref 0.5–2)
LACTATE BLDV-MCNC: 2.5 MMOL/L — HIGH (ref 0.5–2)
LACTATE BLDV-MCNC: 2.5 MMOL/L — HIGH (ref 0.5–2)
LYMPHOCYTES # BLD AUTO: 1.39 K/UL — SIGNIFICANT CHANGE UP (ref 1–3.3)
LYMPHOCYTES # BLD AUTO: 1.39 K/UL — SIGNIFICANT CHANGE UP (ref 1–3.3)
LYMPHOCYTES # BLD AUTO: 7.6 % — LOW (ref 13–44)
LYMPHOCYTES # BLD AUTO: 7.6 % — LOW (ref 13–44)
M PNEUMO DNA SPEC QL NAA+PROBE: SIGNIFICANT CHANGE UP
M PNEUMO DNA SPEC QL NAA+PROBE: SIGNIFICANT CHANGE UP
MAGNESIUM SERPL-MCNC: 2 MG/DL — SIGNIFICANT CHANGE UP (ref 1.6–2.6)
MAGNESIUM SERPL-MCNC: 2 MG/DL — SIGNIFICANT CHANGE UP (ref 1.6–2.6)
MCHC RBC-ENTMCNC: 30.9 PG — SIGNIFICANT CHANGE UP (ref 27–34)
MCHC RBC-ENTMCNC: 30.9 PG — SIGNIFICANT CHANGE UP (ref 27–34)
MCHC RBC-ENTMCNC: 32.8 GM/DL — SIGNIFICANT CHANGE UP (ref 32–36)
MCHC RBC-ENTMCNC: 32.8 GM/DL — SIGNIFICANT CHANGE UP (ref 32–36)
MCV RBC AUTO: 94.2 FL — SIGNIFICANT CHANGE UP (ref 80–100)
MCV RBC AUTO: 94.2 FL — SIGNIFICANT CHANGE UP (ref 80–100)
MONOCYTES # BLD AUTO: 1.27 K/UL — HIGH (ref 0–0.9)
MONOCYTES # BLD AUTO: 1.27 K/UL — HIGH (ref 0–0.9)
MONOCYTES NFR BLD AUTO: 6.9 % — SIGNIFICANT CHANGE UP (ref 2–14)
MONOCYTES NFR BLD AUTO: 6.9 % — SIGNIFICANT CHANGE UP (ref 2–14)
NEUTROPHILS # BLD AUTO: 15.45 K/UL — HIGH (ref 1.8–7.4)
NEUTROPHILS # BLD AUTO: 15.45 K/UL — HIGH (ref 1.8–7.4)
NEUTROPHILS NFR BLD AUTO: 84.1 % — HIGH (ref 43–77)
NEUTROPHILS NFR BLD AUTO: 84.1 % — HIGH (ref 43–77)
NRBC # BLD: 0 /100 WBCS — SIGNIFICANT CHANGE UP (ref 0–0)
NRBC # BLD: 0 /100 WBCS — SIGNIFICANT CHANGE UP (ref 0–0)
NRBC # FLD: 0 K/UL — SIGNIFICANT CHANGE UP (ref 0–0)
NRBC # FLD: 0 K/UL — SIGNIFICANT CHANGE UP (ref 0–0)
NT-PROBNP SERPL-SCNC: 424 PG/ML — HIGH
NT-PROBNP SERPL-SCNC: 424 PG/ML — HIGH
PCO2 BLDV: 36 MMHG — LOW (ref 39–52)
PCO2 BLDV: 36 MMHG — LOW (ref 39–52)
PCO2 BLDV: 38 MMHG — LOW (ref 39–52)
PCO2 BLDV: 38 MMHG — LOW (ref 39–52)
PH BLDV: 7.48 — HIGH (ref 7.32–7.43)
PH BLDV: 7.48 — HIGH (ref 7.32–7.43)
PH BLDV: 7.49 — HIGH (ref 7.32–7.43)
PH BLDV: 7.49 — HIGH (ref 7.32–7.43)
PHOSPHATE SERPL-MCNC: 3 MG/DL — SIGNIFICANT CHANGE UP (ref 2.5–4.5)
PHOSPHATE SERPL-MCNC: 3 MG/DL — SIGNIFICANT CHANGE UP (ref 2.5–4.5)
PLATELET # BLD AUTO: 379 K/UL — SIGNIFICANT CHANGE UP (ref 150–400)
PLATELET # BLD AUTO: 379 K/UL — SIGNIFICANT CHANGE UP (ref 150–400)
PO2 BLDV: 39 MMHG — SIGNIFICANT CHANGE UP (ref 25–45)
PO2 BLDV: 39 MMHG — SIGNIFICANT CHANGE UP (ref 25–45)
PO2 BLDV: 60 MMHG — HIGH (ref 25–45)
PO2 BLDV: 60 MMHG — HIGH (ref 25–45)
POTASSIUM BLDV-SCNC: 2.8 MMOL/L — CRITICAL LOW (ref 3.5–5.1)
POTASSIUM BLDV-SCNC: 2.8 MMOL/L — CRITICAL LOW (ref 3.5–5.1)
POTASSIUM BLDV-SCNC: 3 MMOL/L — LOW (ref 3.5–5.1)
POTASSIUM BLDV-SCNC: 3 MMOL/L — LOW (ref 3.5–5.1)
POTASSIUM SERPL-MCNC: 3.3 MMOL/L — LOW (ref 3.5–5.3)
POTASSIUM SERPL-MCNC: 3.3 MMOL/L — LOW (ref 3.5–5.3)
POTASSIUM SERPL-SCNC: 3.3 MMOL/L — LOW (ref 3.5–5.3)
POTASSIUM SERPL-SCNC: 3.3 MMOL/L — LOW (ref 3.5–5.3)
PROT SERPL-MCNC: 7.4 G/DL — SIGNIFICANT CHANGE UP (ref 6–8.3)
PROT SERPL-MCNC: 7.4 G/DL — SIGNIFICANT CHANGE UP (ref 6–8.3)
RAPID RVP RESULT: SIGNIFICANT CHANGE UP
RAPID RVP RESULT: SIGNIFICANT CHANGE UP
RBC # BLD: 3.62 M/UL — LOW (ref 3.8–5.2)
RBC # BLD: 3.62 M/UL — LOW (ref 3.8–5.2)
RBC # FLD: 11.6 % — SIGNIFICANT CHANGE UP (ref 10.3–14.5)
RBC # FLD: 11.6 % — SIGNIFICANT CHANGE UP (ref 10.3–14.5)
RSV RNA SPEC QL NAA+PROBE: SIGNIFICANT CHANGE UP
RSV RNA SPEC QL NAA+PROBE: SIGNIFICANT CHANGE UP
RV+EV RNA SPEC QL NAA+PROBE: SIGNIFICANT CHANGE UP
RV+EV RNA SPEC QL NAA+PROBE: SIGNIFICANT CHANGE UP
SAO2 % BLDV: 68.3 % — SIGNIFICANT CHANGE UP (ref 67–88)
SAO2 % BLDV: 68.3 % — SIGNIFICANT CHANGE UP (ref 67–88)
SAO2 % BLDV: 91.8 % — HIGH (ref 67–88)
SAO2 % BLDV: 91.8 % — HIGH (ref 67–88)
SARS-COV-2 RNA SPEC QL NAA+PROBE: SIGNIFICANT CHANGE UP
SARS-COV-2 RNA SPEC QL NAA+PROBE: SIGNIFICANT CHANGE UP
SODIUM SERPL-SCNC: 131 MMOL/L — LOW (ref 135–145)
SODIUM SERPL-SCNC: 131 MMOL/L — LOW (ref 135–145)
TROPONIN T, HIGH SENSITIVITY RESULT: 11 NG/L — SIGNIFICANT CHANGE UP
TROPONIN T, HIGH SENSITIVITY RESULT: 11 NG/L — SIGNIFICANT CHANGE UP
WBC # BLD: 18.37 K/UL — HIGH (ref 3.8–10.5)
WBC # BLD: 18.37 K/UL — HIGH (ref 3.8–10.5)
WBC # FLD AUTO: 18.37 K/UL — HIGH (ref 3.8–10.5)
WBC # FLD AUTO: 18.37 K/UL — HIGH (ref 3.8–10.5)

## 2023-10-19 PROCEDURE — 71250 CT THORAX DX C-: CPT | Mod: 26,MA

## 2023-10-19 PROCEDURE — 99285 EMERGENCY DEPT VISIT HI MDM: CPT

## 2023-10-19 PROCEDURE — 71046 X-RAY EXAM CHEST 2 VIEWS: CPT | Mod: 26

## 2023-10-19 PROCEDURE — 99223 1ST HOSP IP/OBS HIGH 75: CPT

## 2023-10-19 RX ORDER — CEFTRIAXONE 500 MG/1
1000 INJECTION, POWDER, FOR SOLUTION INTRAMUSCULAR; INTRAVENOUS EVERY 24 HOURS
Refills: 0 | Status: DISCONTINUED | OUTPATIENT
Start: 2023-10-20 | End: 2023-10-21

## 2023-10-19 RX ORDER — METRONIDAZOLE 500 MG
500 TABLET ORAL EVERY 8 HOURS
Refills: 0 | Status: DISCONTINUED | OUTPATIENT
Start: 2023-10-20 | End: 2023-10-21

## 2023-10-19 RX ORDER — SODIUM CHLORIDE 9 MG/ML
1000 INJECTION INTRAMUSCULAR; INTRAVENOUS; SUBCUTANEOUS ONCE
Refills: 0 | Status: COMPLETED | OUTPATIENT
Start: 2023-10-19 | End: 2023-10-19

## 2023-10-19 RX ORDER — POTASSIUM CHLORIDE 20 MEQ
40 PACKET (EA) ORAL ONCE
Refills: 0 | Status: COMPLETED | OUTPATIENT
Start: 2023-10-19 | End: 2023-10-19

## 2023-10-19 RX ORDER — HEPARIN SODIUM 5000 [USP'U]/ML
5000 INJECTION INTRAVENOUS; SUBCUTANEOUS EVERY 12 HOURS
Refills: 0 | Status: COMPLETED | OUTPATIENT
Start: 2023-10-19 | End: 2023-10-22

## 2023-10-19 RX ORDER — ACETAMINOPHEN 500 MG
650 TABLET ORAL ONCE
Refills: 0 | Status: COMPLETED | OUTPATIENT
Start: 2023-10-19 | End: 2023-10-19

## 2023-10-19 RX ORDER — LANOLIN ALCOHOL/MO/W.PET/CERES
3 CREAM (GRAM) TOPICAL AT BEDTIME
Refills: 0 | Status: DISCONTINUED | OUTPATIENT
Start: 2023-10-19 | End: 2023-10-27

## 2023-10-19 RX ORDER — CEFTRIAXONE 500 MG/1
1000 INJECTION, POWDER, FOR SOLUTION INTRAMUSCULAR; INTRAVENOUS ONCE
Refills: 0 | Status: COMPLETED | OUTPATIENT
Start: 2023-10-19 | End: 2023-10-19

## 2023-10-19 RX ORDER — CEFEPIME 1 G/1
2000 INJECTION, POWDER, FOR SOLUTION INTRAMUSCULAR; INTRAVENOUS ONCE
Refills: 0 | Status: DISCONTINUED | OUTPATIENT
Start: 2023-10-19 | End: 2023-10-19

## 2023-10-19 RX ORDER — METRONIDAZOLE 500 MG
500 TABLET ORAL ONCE
Refills: 0 | Status: COMPLETED | OUTPATIENT
Start: 2023-10-19 | End: 2023-10-19

## 2023-10-19 RX ORDER — ACETAMINOPHEN 500 MG
650 TABLET ORAL EVERY 6 HOURS
Refills: 0 | Status: DISCONTINUED | OUTPATIENT
Start: 2023-10-19 | End: 2023-10-27

## 2023-10-19 RX ORDER — IPRATROPIUM/ALBUTEROL SULFATE 18-103MCG
3 AEROSOL WITH ADAPTER (GRAM) INHALATION EVERY 6 HOURS
Refills: 0 | Status: DISCONTINUED | OUTPATIENT
Start: 2023-10-19 | End: 2023-10-27

## 2023-10-19 RX ADMIN — SODIUM CHLORIDE 1000 MILLILITER(S): 9 INJECTION INTRAMUSCULAR; INTRAVENOUS; SUBCUTANEOUS at 18:42

## 2023-10-19 RX ADMIN — Medication 650 MILLIGRAM(S): at 15:58

## 2023-10-19 RX ADMIN — Medication 40 MILLIEQUIVALENT(S): at 21:19

## 2023-10-19 RX ADMIN — CEFTRIAXONE 100 MILLIGRAM(S): 500 INJECTION, POWDER, FOR SOLUTION INTRAMUSCULAR; INTRAVENOUS at 16:32

## 2023-10-19 RX ADMIN — Medication 100 MILLIGRAM(S): at 17:25

## 2023-10-19 NOTE — H&P ADULT - NSHPSOCIALHISTORY_GEN_ALL_CORE
SOCIAL HISTORY:    Marital Status:  (  )    (  ) Single        (  )        (  )        (  )   Lives with:          (  ) Alone      (  ) Spouse     (  ) Children         (  ) Parents             (  ) Other    No history of smoking  History of second hand smoking exposure (per HIE chart review)  No history of alcohol abuse  No history of illegal drug use    Occupation: SOCIAL HISTORY:    Marital Status:  (  )    (  ) Single        (  )        (  )        ( x )   Lives with:          (  ) Alone      (  ) Spouse     (  ) Children         (  ) Parents             (  ) Other    No personal history of smoking  History of second hand smoking exposure (parents, )  No history of alcohol abuse  No history of illegal drug use    Occupation:

## 2023-10-19 NOTE — H&P ADULT - NSHPPHYSICALEXAM_GEN_ALL_CORE
Vital Signs Last 24 Hrs  T(C): 37.4 (19 Oct 2023 21:19), Max: 39.1 (19 Oct 2023 15:56)  T(F): 99.4 (19 Oct 2023 21:19), Max: 102.4 (19 Oct 2023 15:56)  HR: 84 (19 Oct 2023 21:19) (81 - 99)  BP: 141/73 (19 Oct 2023 21:19) (125/76 - 141/73)  BP(mean): --  RR: 18 (19 Oct 2023 21:19) (18 - 20)  SpO2: 98% (19 Oct 2023 21:19) (95% - 98%)    Parameters below as of 19 Oct 2023 21:19  Patient On (Oxygen Delivery Method): room air    ================================================== Vital Signs Last 24 Hrs  T(C): 37.4 (19 Oct 2023 21:19), Max: 39.1 (19 Oct 2023 15:56)  T(F): 99.4 (19 Oct 2023 21:19), Max: 102.4 (19 Oct 2023 15:56)  HR: 84 (19 Oct 2023 21:19) (81 - 99)  BP: 141/73 (19 Oct 2023 21:19) (125/76 - 141/73)  BP(mean): --  RR: 18 (19 Oct 2023 21:19) (18 - 20)  SpO2: 98% (19 Oct 2023 21:19) (95% - 98%)    Parameters below as of 19 Oct 2023 21:19  Patient On (Oxygen Delivery Method): room air    ==================================================    PHYSICAL EXAMINATION:    APPEARANCE: Adequately groomed, overweight female, lying propped up in stretcher in NAD  HEENT: Dry oral mucosa.  Pupils reactive to light.  Poor dentition  LYMPHATIC: No lymphadenopathy appreciated  CARDIOVASCULAR: (+) S1 S2.  No JVD.  No murmurs.  Some edema of the L-leg  RESPIRATORY: Decreased breath sounds over the L-lower lung field.  Mild crackles appreciated  GASTROINTESTINAL: Soft.  Non-tender.  (+) BS  GENITOURINARY: No suprapubic tenderness.  No CVA tenderness B/L  EXTREMITIES: L-leg a bit larger than the right.  Normal range of motion.  No clubbing or cyanosis  MUSCULOSKELETAL: No atrophy.  No asymmetry.  Good ROM  SKIN: No rashes. No ecchymoses.  No cyanosis  PSYCHIATRIC: A&O x 3.  Mood & affect appropriate to situation  NEUROLOGICAL: Non-focal, DOWELL x 4 against gravity  VASCULAR: Peripheral pulses palpable

## 2023-10-19 NOTE — H&P ADULT - NSICDXPASTSURGICALHX_GEN_ALL_CORE_FT
Called patient's daughter, Dalia, see demographics, and asked to have her mother call us. Patient then called. Informed her of recommendations. She states she feels better than yesterday. Just a little fatigued.  She declines going to the ED at this time. She would like to try the medication. Reviewed symptoms and asked that she go to the the ED if she starts having any of the symptoms discussed. Verbalized understanding. PSR (Kathleen) scheduled patient for lab appointment tomorrow.    PAST SURGICAL HISTORY:  History of lumpectomy of left breast      PAST SURGICAL HISTORY:  History of cholecystectomy     History of lumpectomy of left breast      PAST SURGICAL HISTORY:  Breast cyst, left     History of cholecystectomy     History of lumpectomy of left breast     Venous stasis ulcer of right lower extremity

## 2023-10-19 NOTE — H&P ADULT - PROBLEM SELECTOR PLAN 5
- ECG = sinus rhythm w/ frequent PVCs at 97 bpm, QTc = 515, RBBB, TWI in III  - - dry oral mucosa, lab-work appears somewhat hemoconcentrated  - sodium = 131  - insensible losses from fever, cough  - s/p one liter NaCl in the ED; additional IVF of NaCl one liter, followed by maintenance IVF at 100 mL/Hr x 10 hours in progress

## 2023-10-19 NOTE — H&P ADULT - HISTORY OF PRESENT ILLNESS
53 year old female, with past history significant for Asthma and SVT, presented to the ED secondary to fever.  Seen and evaluated at bedside;    Vital signs upon ED presentation as follows: BP = 125/76, HR = 99, RR = 20, T = 38.1 to 39.1 C (100.5 to 102.4 F), O2 Sat = 95% on RA.  Diagnosed with Pleural Effusion and prescribed ceftriaxone 1 gram, metronidazole 500 mg, NaCl one liter, Tylenol 650 mg, and Potassium chloride 40 meq x one in the ED. 53 year old female, with past history significant for Asthma, SVT, HNP, Migraine presented to the ED secondary to fever.  Seen and evaluated at bedside; NAD.  Patient reports sudden onset of left sided chest pain one week ago and saw her PCP who suspected the cause to be costochondritis - especially since patient had undergone some stretching exercises and was s/p use of a vibrating gun just prior to onset.  Pain begins at the ribs below the left breast and tends to shoot upward the chest to the neck; extremely sharp pain and is exacerbated by walking, coughing and deep inspiration.  However, although patient used meloxicam, flexeril and Ibuprofen, pain and discomfort did not improve.  Patient began to suffer with fever (max of 102.5 F), which would decrease at night, but return in the mornings, chills, and coughing.  CXR was subsequently done, and patient was sent to the ED for further evaluation.    Vital signs upon ED presentation as follows: BP = 125/76, HR = 99, RR = 20, T = 38.1 to 39.1 C (100.5 to 102.4 F), O2 Sat = 95% on RA.  Diagnosed with Pleural Effusion and prescribed ceftriaxone 1 gram, metronidazole 500 mg, NaCl one liter, Tylenol 650 mg, and Potassium chloride 40 meq x one in the ED.

## 2023-10-19 NOTE — H&P ADULT - PROBLEM SELECTOR PLAN 3
- left sided  - CT demonstrates "moderate-sized loculated left pleural effusion with associated mediastinal pleural thickening..."  - also w/ enlarged mediastinal and internal mammary node enlargement  - history of left sided lumpectomy and fluid aspiration of left breast cyst  - on antibiotic  - ID consult in the AM (please call)_  - Pulmonology consult in the AM (please call)

## 2023-10-19 NOTE — H&P ADULT - NSICDXFAMILYHX_GEN_ALL_CORE_FT
FAMILY HISTORY:  Father  Still living? Unknown  Family history of diabetes mellitus (DM), Age at diagnosis: Age Unknown  Family history of hypertension, Age at diagnosis: Age Unknown     FAMILY HISTORY:  Father  Still living? Unknown  Family history of COPD (chronic obstructive pulmonary disease), Age at diagnosis: Age Unknown  Family history of diabetes mellitus (DM), Age at diagnosis: Age Unknown  Family history of eosinophilia, Age at diagnosis: Age Unknown  Family history of hypertension, Age at diagnosis: Age Unknown  Family history of osteopenia, Age at diagnosis: Age Unknown  Family history of smoking, Age at diagnosis: Age Unknown    Mother  Still living? Unknown  Family history of COPD (chronic obstructive pulmonary disease), Age at diagnosis: Age Unknown  Family history of smoking, Age at diagnosis: Age Unknown

## 2023-10-19 NOTE — H&P ADULT - ASSESSMENT
[ x ]  Lab studies personally reviewed  [ x ]  Radiology personally reviewed  [  ]  Old records personally reviewed    53 year old female, with past history significant for Asthma and SVT, presented to the ED secondary to fever.  Seen and evaluated at bedside;    Vital signs upon ED presentation as follows: BP = 125/76, HR = 99, RR = 20, T = 38.1 to 39.1 C (100.5 to 102.4 F), O2 Sat = 95% on RA.  Diagnosed with Pleural Effusion and prescribed ceftriaxone 1 gram, metronidazole 500 mg, NaCl one liter, Tylenol 650 mg, and Potassium chloride 40 meq x one in the ED. [ x ]  Lab studies personally reviewed  [ x ]  Radiology personally reviewed  [ x ]  Old records personally reviewed (including HIE)    53 year old female, with past history significant for Asthma, SVT, HNP, Migraine presented to the ED secondary to fever.  Diagnosed with Pleural Effusion in the ED.

## 2023-10-19 NOTE — ED PROVIDER NOTE - ATTENDING CONTRIBUTION TO CARE
[place]: 54 yo woman sent by pmd for large pleural effusion on left side found by pcp.  pt presents with fever (102 here), pmhx, svt, high bmi, asthma,   fever has been for 1 week.    denies N/V/abd pain/ HA/ dysuria/ urgency/ vaginal dc/ extremity issue    pt alert and can phonate well  h at/nc, face with red cheeks bilat, ? malar rash?  perrl, conj clear, sclera anicteric,  neck supple  ext no edema no deformities, no swelling  neueo awake, lucid normal gait moves all extremities with strength  psych normal affect  vs t 102.    concern for fever in setting of pulm effusion: empyema, pna, mass, rheum issue?  sending labs, will ct and give abx.    I performed a history and physical exam of the patient and discussed their management with the resident and /or advanced care provider. I reviewed the resident and /or ACP's note and agree with the documented findings and plan of care. My medical decison making and observations are found above.

## 2023-10-19 NOTE — ED ADULT NURSE REASSESSMENT NOTE - NS ED NURSE REASSESS COMMENT FT1
Pt a&ox3, fever improved, pt admits to feeling much better, pt in CT, denies pain, no SOB., unlabored breathing, equal rise & fall, able to speak in full sentences.

## 2023-10-19 NOTE — H&P ADULT - PROBLEM SELECTOR PLAN 4
- CT demonstrates "moderate-sized loculated left pleural effusion with associated mediastinal pleural thickening"  - - ECG = sinus rhythm w/ frequent PVCs at 97 bpm, QTc = 515, RBBB, TWI in III  - no prior study for comparison  - TSH = 1.14 (AM lab-work)  - f/u repeat study in the AM  - f/u electrolytes and correct any imbalances

## 2023-10-19 NOTE — ED PROVIDER NOTE - CLINICAL SUMMARY MEDICAL DECISION MAKING FREE TEXT BOX
Nikole Saravia DO PGY-3  HPI:   53-year-old female with history of asthma, SVT on Bystolic presents to the ED with 1 week of fevers Tmax 102 and associated cough.  Patient reports associated left lower rib pain.  Had outpatient x-ray done which demonstrated large left pleural effusion sent to the ER for further work-up.  Patient denies recent falls or trauma, no recent hospitalizations or travel.  No history of similar symptoms.    ROS:   +fever, chills, +L lower chest pain,   Denies shortness of breath, abdominal pain, nausea, vomiting, diarrhea, dysuria, hematuria    PHYSICAL EXAM:  CONSTITUTIONAL: Well appearing, awake, alert, oriented to person, place, time/situation and in no apparent distress.  HEAD: Atraumatic, no step offs.  NECK: Supple, no meningismus.   EYES: Clear bilaterally, pupils equal, round and reactive to light.  ENMT: Airway patent, Nasal mucosa clear. Mouth with normal mucosa. Uvula is midline.   CARDIAC: Normal rate, regular rhythm. +S1/S2. No murmurs, rubs or gallops.  RESPIRATORY: Breathing unlabored. [id number]% on room air.   ABDOMEN:  Soft, nontender, nondistended. No rebound tenderness or guarding.  FLANK: No CVA tenderness B/L.  NEUROLOGICAL: Alert and oriented, no focal deficits, no motor or sensory deficits.   MSK: Trace lower extremity edema B/L. No clubbing, cyanosis. Full range of motion of all extremities.   SKIN: Skin warm and dry. No evidence of rashes or lesions.    MDM:   53 year old female with PMH asthma, SVT presents to the ED with large left pleural effusion found on chest x-ray after having cough, fever for 1 week. Presents febrile, hemodynamically stable, with diminished lung sounds on the left lower lung. Lungs otherwise clear. No history of trauma.  Differential includes but is not limited to empyema, pneumonia, malignancy, mass, autoimmune disorder, viral syndrome.    Plan to obtain CT, labs, abx, RVP, blood cultures, antibiose empirically, and pt will likely require admission. Nikole Saravia DO PGY-3  HPI:   53-year-old female with history of asthma, SVT on Bystolic presents to the ED with 1 week of fevers Tmax 102 and associated cough.  Patient reports associated left lower rib pain.  Had outpatient x-ray done which demonstrated large left pleural effusion sent to the ER for further work-up.  Patient denies recent falls or trauma, no recent hospitalizations or travel.  No history of similar symptoms.    ROS:   +fever, chills, +L lower chest pain,   Denies shortness of breath, abdominal pain, nausea, vomiting, diarrhea, dysuria, hematuria    PHYSICAL EXAM:  CONSTITUTIONAL: Well appearing, awake, alert, oriented to person, place, time/situation and in no apparent distress.  HEAD: Atraumatic, no step offs.  NECK: Supple, no meningismus.   EYES: Clear bilaterally, pupils equal, round and reactive to light.  ENMT: Airway patent, Nasal mucosa clear. Mouth with normal mucosa. Uvula is midline.   CARDIAC: Normal rate, regular rhythm. +S1/S2. No murmurs, rubs or gallops.  RESPIRATORY: Breathing unlabored. [id number]% on room air.   ABDOMEN:  Soft, nontender, nondistended. No rebound tenderness or guarding.  FLANK: No CVA tenderness B/L.  NEUROLOGICAL: Alert and oriented, no focal deficits, no motor or sensory deficits.   MSK: Trace lower extremity edema B/L. No clubbing, cyanosis. Full range of motion of all extremities.   SKIN: Skin warm and dry. No evidence of rashes or lesions.    MDM:   53 year old female with PMH asthma, SVT presents to the ED with large left pleural effusion found on chest x-ray after having cough, fever for 1 week. Presents febrile, hemodynamically stable, with diminished lung sounds on the left lower lung. Lungs otherwise clear. No history of trauma.  Differential includes but is not limited to empyema, pneumonia, malignancy, mass, autoimmune disorder, viral syndrome.    Plan to obtain CT, labs, abx, RVP, blood cultures, antibiose empirically, and pt will likely require admission.    [place]: 52 yo woman sent by pmd for large pleural effusion on left side found by pcp.  pt presents with fever (102 here), pmhx, svt, high bmi, asthma,   fever has been for 1 week.    denies N/V/abd pain/ HA/ dysuria/ urgency/ vaginal dc/ extremity issue    pt alert and can phonate well  h at/nc, face with red cheeks bilat, ? malar rash?  perrl, conj clear, sclera anicteric,  neck supple  ext no edema no deformities, no swelling  neueo awake, lucid normal gait moves all extremities with strength  psych normal affect  vs t 102.    concern for fever in setting of pulm effusion: empyema, pna, mass, rheum issue?  sending labs, will ct and give abx.

## 2023-10-19 NOTE — ED PROVIDER NOTE - PROGRESS NOTE DETAILS
Nikole Saravia DO PGY-3  Discussed results with patient including all the findings on the CT scan and provided her a copy for her records.  Hospitalist paged for further work-up.

## 2023-10-19 NOTE — ED ADULT NURSE NOTE - OBJECTIVE STATEMENT
Pt a&ox3, in ED for fever, nausea and vomiting, pain to left lung, received an x ray today that showed a pleural effusion to left lung. No SOB, unlabored breathing, equal rise & fall, able to speak in full sentences, no N/V/D at this time, denies CP, or abd pain, PMH of SVT and asthma.

## 2023-10-19 NOTE — H&P ADULT - NSHPREVIEWOFSYSTEMS_GEN_ALL_CORE
REVIEW OF SYSTEMS:    CONSTITUTIONAL: Generalized weakness, fatigue, fever, chills  EYES/ENT: No visual changes.  No dysphagia.  Some absent teeth  NECK: No pain or stiffness  RESPIRATORY: (+) non-productive coughing.  No hemoptysis.  No shortness of breath  CARDIOVASCULAR: Severe L-sided chest pain radiating up the chest at times.   No palpitations.  Chronic L-leg edema (in the context of venous stasis)  GASTROINTESTINAL: No epigastric or abdominal pain. No nausea, vomiting or hematemesis.  No diarrhea or constipation. No melena or hematochezia.  GENITOURINARY: No dysuria, frequency or hematuria  MUSCULOSKELETAL: Chronic L-leg edema.  No joint pain, decreased ROM, erythema, warmth  NEUROLOGICAL: No numbness or weakness  PSYCHIATRY: No anxiety, or depression.  SKIN: No itching, burning, rashes, or lesions   All other review of systems is negative unless indicated above.

## 2023-10-19 NOTE — H&P ADULT - REASON FOR ADMISSION
Pleural effusion Sepsis due to undetermined organism, Left lower lobe pneumonia, Loculate pleural effusion

## 2023-10-19 NOTE — H&P ADULT - NSHPLABSRESULTS_GEN_ALL_CORE
11.2   18.37 )-----------( 379      ( 19 Oct 2023 15:50 )             34.1     131<L>  |  92<L>  |  7   ----------------------------<  93     10-19  3.3<L>   |  22  |  0.59    Ca    9.3      19 Oct 2023 15:50    TPro  7.4  /  Alb  2.8<L>  /  TBili  2.1<H>  /  DBili  x   /  AST  47<H>  /  ALT  40<H>  /  AlkPhos  181<H>  10-19    18:35 - VBG - pH: 7.49  | pCO2: 36    | pO2: 60    | Lactate: 1.0    15:50 - VBG - pH: 7.48  | pCO2: 38    | pO2: 39    | Lactate: 2.5      hs Troponin, T - 11 ng/L (10-19-23 @ 15:50)    Pro-BNP: 424 (10-19-23 @ 15:50)    =================================================        ================================================= 11.2   18.37 )-----------( 379      ( 19 Oct 2023 15:50 )             34.1     131<L>  |  92<L>  |  7   ----------------------------<  93     10-19  3.3<L>   |  22  |  0.59    Ca    9.3      19 Oct 2023 15:50    TPro  7.4  /  Alb  2.8<L>  /  TBili  2.1<H>  /  DBili  x   /  AST  47<H>  /  ALT  40<H>  /  AlkPhos  181<H>  10-19    18:35 - VBG - pH: 7.49  | pCO2: 36    | pO2: 60    | Lactate: 1.0    15:50 - VBG - pH: 7.48  | pCO2: 38    | pO2: 39    | Lactate: 2.5      hs Troponin, T - 11 ng/L (10-19-23 @ 15:50)    Pro-BNP: 424 (10-19-23 @ 15:50)    =================================================================    ECG = sinus rhythm w/ frequent PVCs at 97 bpm, QTc = 515, RBBB, TWI in III    =================================================================

## 2023-10-19 NOTE — ED ADULT TRIAGE NOTE - CHIEF COMPLAINT QUOTE
Pt had chest xray outpatient which showed fluid in the lungs and pt c/o fevers and L sided rib pain, denies shortness of breath or chest pain. History of SVT, asthma.

## 2023-10-19 NOTE — H&P ADULT - NSICDXPASTMEDICALHX_GEN_ALL_CORE_FT
PAST MEDICAL HISTORY:  Asthma     Cervical spine degeneration     HNP (herniated nucleus pulposus), thoracic     Migraine     Pyogenic granuloma     Schamberg's disease     SVT (supraventricular tachycardia)

## 2023-10-19 NOTE — H&P ADULT - PROBLEM SELECTOR PLAN 2
- CT scan w/ findings of "right lower lobe superior segment 2.5 cm nodular consolidative opacity; differential diagnosis includes infection and primary lung malignancy.  ...Lingular and left lower lobe parenchymal opacification may represent atelectasis or pneumonia..."  - complicated by loculated pleural effusion  - started on ceftriaxone and metronidazole in the ED; continuing  - Tylenol PRN fever  - ensure optimal hydration  - f/u blood cultures x 2 (in progress)  - ensure optimal hydration  - HOB elevation  - f/u pulse oximetry

## 2023-10-19 NOTE — H&P ADULT - PROBLEM SELECTOR PLAN 1
- Elevated WBC, febrile, tachycardic, Tachycardic  - presented w/ severe left sided chest pain, non-productive cough, chills  - CXR demonstrative of pneumonia, pleural effusion, mildly enlarged mediastinal nodes and intermediate left internal mammary node  - started on ceftriaxone and metronidazole in the ED; continuing  - Tylenol PRN fever  - f/u blood cultures (in progress)  - ensure optimal hydration; s/o one liter NaCl in the ED.  Additional IVF prescribed  - ID consult in the AM - Elevated WBC, febrile, tachycardic  - lactate elevated at 2.5  - presented w/ severe left sided chest pain, non-productive cough, chills  - CXR demonstrative of pneumonia, pleural effusion, mildly enlarged mediastinal nodes and intermediate left internal mammary node  - started on ceftriaxone and metronidazole in the ED; continuing  - Tylenol PRN fever  - f/u blood cultures (in progress)  - ensure optimal hydration; s/o one liter NaCl in the ED.  Additional IVF prescribed  - ID consult in the AM

## 2023-10-19 NOTE — H&P ADULT - PROBLEM SELECTOR PLAN 6
- total bilirubin = 2.1  - also noted w/ elevated ALP (181), AST (47), ALT (40)  - likely sequela of s/e of sepsis  - f/u trend w/ repeat lab-work

## 2023-10-20 DIAGNOSIS — E86.0 DEHYDRATION: ICD-10-CM

## 2023-10-20 DIAGNOSIS — Z29.9 ENCOUNTER FOR PROPHYLACTIC MEASURES, UNSPECIFIED: ICD-10-CM

## 2023-10-20 DIAGNOSIS — E88.09 OTHER DISORDERS OF PLASMA-PROTEIN METABOLISM, NOT ELSEWHERE CLASSIFIED: ICD-10-CM

## 2023-10-20 DIAGNOSIS — Z90.49 ACQUIRED ABSENCE OF OTHER SPECIFIED PARTS OF DIGESTIVE TRACT: Chronic | ICD-10-CM

## 2023-10-20 DIAGNOSIS — I83.019 VARICOSE VEINS OF RIGHT LOWER EXTREMITY WITH ULCER OF UNSPECIFIED SITE: Chronic | ICD-10-CM

## 2023-10-20 DIAGNOSIS — Z98.890 OTHER SPECIFIED POSTPROCEDURAL STATES: Chronic | ICD-10-CM

## 2023-10-20 DIAGNOSIS — I47.10 SUPRAVENTRICULAR TACHYCARDIA, UNSPECIFIED: ICD-10-CM

## 2023-10-20 DIAGNOSIS — J90 PLEURAL EFFUSION, NOT ELSEWHERE CLASSIFIED: ICD-10-CM

## 2023-10-20 DIAGNOSIS — R94.31 ABNORMAL ELECTROCARDIOGRAM [ECG] [EKG]: ICD-10-CM

## 2023-10-20 DIAGNOSIS — N60.02 SOLITARY CYST OF LEFT BREAST: Chronic | ICD-10-CM

## 2023-10-20 DIAGNOSIS — A41.9 SEPSIS, UNSPECIFIED ORGANISM: ICD-10-CM

## 2023-10-20 DIAGNOSIS — R17 UNSPECIFIED JAUNDICE: ICD-10-CM

## 2023-10-20 DIAGNOSIS — J18.9 PNEUMONIA, UNSPECIFIED ORGANISM: ICD-10-CM

## 2023-10-20 LAB
24R-OH-CALCIDIOL SERPL-MCNC: 35.8 NG/ML — SIGNIFICANT CHANGE UP (ref 30–80)
24R-OH-CALCIDIOL SERPL-MCNC: 35.8 NG/ML — SIGNIFICANT CHANGE UP (ref 30–80)
ALBUMIN SERPL ELPH-MCNC: 2.8 G/DL — LOW (ref 3.3–5)
ALBUMIN SERPL ELPH-MCNC: 2.8 G/DL — LOW (ref 3.3–5)
ALP SERPL-CCNC: 149 U/L — HIGH (ref 40–120)
ALP SERPL-CCNC: 149 U/L — HIGH (ref 40–120)
ALT FLD-CCNC: 36 U/L — HIGH (ref 4–33)
ALT FLD-CCNC: 36 U/L — HIGH (ref 4–33)
ANION GAP SERPL CALC-SCNC: 12 MMOL/L — SIGNIFICANT CHANGE UP (ref 7–14)
ANION GAP SERPL CALC-SCNC: 12 MMOL/L — SIGNIFICANT CHANGE UP (ref 7–14)
APPEARANCE UR: ABNORMAL
APPEARANCE UR: ABNORMAL
APTT BLD: 33 SEC — SIGNIFICANT CHANGE UP (ref 24.5–35.6)
APTT BLD: 33 SEC — SIGNIFICANT CHANGE UP (ref 24.5–35.6)
AST SERPL-CCNC: 40 U/L — HIGH (ref 4–32)
AST SERPL-CCNC: 40 U/L — HIGH (ref 4–32)
BACTERIA # UR AUTO: NEGATIVE /HPF — SIGNIFICANT CHANGE UP
BACTERIA # UR AUTO: NEGATIVE /HPF — SIGNIFICANT CHANGE UP
BASOPHILS # BLD AUTO: 0.04 K/UL — SIGNIFICANT CHANGE UP (ref 0–0.2)
BASOPHILS # BLD AUTO: 0.04 K/UL — SIGNIFICANT CHANGE UP (ref 0–0.2)
BASOPHILS NFR BLD AUTO: 0.3 % — SIGNIFICANT CHANGE UP (ref 0–2)
BASOPHILS NFR BLD AUTO: 0.3 % — SIGNIFICANT CHANGE UP (ref 0–2)
BILIRUB SERPL-MCNC: 1.9 MG/DL — HIGH (ref 0.2–1.2)
BILIRUB SERPL-MCNC: 1.9 MG/DL — HIGH (ref 0.2–1.2)
BILIRUB UR-MCNC: ABNORMAL
BILIRUB UR-MCNC: ABNORMAL
BUN SERPL-MCNC: 6 MG/DL — LOW (ref 7–23)
BUN SERPL-MCNC: 6 MG/DL — LOW (ref 7–23)
CALCIUM SERPL-MCNC: 8.8 MG/DL — SIGNIFICANT CHANGE UP (ref 8.4–10.5)
CALCIUM SERPL-MCNC: 8.8 MG/DL — SIGNIFICANT CHANGE UP (ref 8.4–10.5)
CAST: 0 /LPF — SIGNIFICANT CHANGE UP (ref 0–4)
CAST: 0 /LPF — SIGNIFICANT CHANGE UP (ref 0–4)
CHLORIDE SERPL-SCNC: 100 MMOL/L — SIGNIFICANT CHANGE UP (ref 98–107)
CHLORIDE SERPL-SCNC: 100 MMOL/L — SIGNIFICANT CHANGE UP (ref 98–107)
CO2 SERPL-SCNC: 24 MMOL/L — SIGNIFICANT CHANGE UP (ref 22–31)
CO2 SERPL-SCNC: 24 MMOL/L — SIGNIFICANT CHANGE UP (ref 22–31)
COLOR SPEC: SIGNIFICANT CHANGE UP
COLOR SPEC: SIGNIFICANT CHANGE UP
CREAT SERPL-MCNC: 0.49 MG/DL — LOW (ref 0.5–1.3)
CREAT SERPL-MCNC: 0.49 MG/DL — LOW (ref 0.5–1.3)
DIFF PNL FLD: NEGATIVE — SIGNIFICANT CHANGE UP
DIFF PNL FLD: NEGATIVE — SIGNIFICANT CHANGE UP
EGFR: 113 ML/MIN/1.73M2 — SIGNIFICANT CHANGE UP
EGFR: 113 ML/MIN/1.73M2 — SIGNIFICANT CHANGE UP
EOSINOPHIL # BLD AUTO: 0.12 K/UL — SIGNIFICANT CHANGE UP (ref 0–0.5)
EOSINOPHIL # BLD AUTO: 0.12 K/UL — SIGNIFICANT CHANGE UP (ref 0–0.5)
EOSINOPHIL NFR BLD AUTO: 0.8 % — SIGNIFICANT CHANGE UP (ref 0–6)
EOSINOPHIL NFR BLD AUTO: 0.8 % — SIGNIFICANT CHANGE UP (ref 0–6)
GLUCOSE SERPL-MCNC: 106 MG/DL — HIGH (ref 70–99)
GLUCOSE SERPL-MCNC: 106 MG/DL — HIGH (ref 70–99)
GLUCOSE UR QL: NEGATIVE MG/DL — SIGNIFICANT CHANGE UP
GLUCOSE UR QL: NEGATIVE MG/DL — SIGNIFICANT CHANGE UP
HCT VFR BLD CALC: 30.9 % — LOW (ref 34.5–45)
HCT VFR BLD CALC: 30.9 % — LOW (ref 34.5–45)
HGB BLD-MCNC: 10.4 G/DL — LOW (ref 11.5–15.5)
HGB BLD-MCNC: 10.4 G/DL — LOW (ref 11.5–15.5)
IANC: 12.4 K/UL — HIGH (ref 1.8–7.4)
IANC: 12.4 K/UL — HIGH (ref 1.8–7.4)
IMM GRANULOCYTES NFR BLD AUTO: 1.9 % — HIGH (ref 0–0.9)
IMM GRANULOCYTES NFR BLD AUTO: 1.9 % — HIGH (ref 0–0.9)
INR BLD: 1.6 RATIO — HIGH (ref 0.85–1.18)
INR BLD: 1.6 RATIO — HIGH (ref 0.85–1.18)
KETONES UR-MCNC: 15 MG/DL
KETONES UR-MCNC: 15 MG/DL
LEGIONELLA AG UR QL: NEGATIVE — SIGNIFICANT CHANGE UP
LEGIONELLA AG UR QL: NEGATIVE — SIGNIFICANT CHANGE UP
LEUKOCYTE ESTERASE UR-ACNC: ABNORMAL
LEUKOCYTE ESTERASE UR-ACNC: ABNORMAL
LYMPHOCYTES # BLD AUTO: 1.19 K/UL — SIGNIFICANT CHANGE UP (ref 1–3.3)
LYMPHOCYTES # BLD AUTO: 1.19 K/UL — SIGNIFICANT CHANGE UP (ref 1–3.3)
LYMPHOCYTES # BLD AUTO: 7.8 % — LOW (ref 13–44)
LYMPHOCYTES # BLD AUTO: 7.8 % — LOW (ref 13–44)
MAGNESIUM SERPL-MCNC: 2 MG/DL — SIGNIFICANT CHANGE UP (ref 1.6–2.6)
MAGNESIUM SERPL-MCNC: 2 MG/DL — SIGNIFICANT CHANGE UP (ref 1.6–2.6)
MCHC RBC-ENTMCNC: 31.5 PG — SIGNIFICANT CHANGE UP (ref 27–34)
MCHC RBC-ENTMCNC: 31.5 PG — SIGNIFICANT CHANGE UP (ref 27–34)
MCHC RBC-ENTMCNC: 33.7 GM/DL — SIGNIFICANT CHANGE UP (ref 32–36)
MCHC RBC-ENTMCNC: 33.7 GM/DL — SIGNIFICANT CHANGE UP (ref 32–36)
MCV RBC AUTO: 93.6 FL — SIGNIFICANT CHANGE UP (ref 80–100)
MCV RBC AUTO: 93.6 FL — SIGNIFICANT CHANGE UP (ref 80–100)
MONOCYTES # BLD AUTO: 1.19 K/UL — HIGH (ref 0–0.9)
MONOCYTES # BLD AUTO: 1.19 K/UL — HIGH (ref 0–0.9)
MONOCYTES NFR BLD AUTO: 7.8 % — SIGNIFICANT CHANGE UP (ref 2–14)
MONOCYTES NFR BLD AUTO: 7.8 % — SIGNIFICANT CHANGE UP (ref 2–14)
NEUTROPHILS # BLD AUTO: 12.4 K/UL — HIGH (ref 1.8–7.4)
NEUTROPHILS # BLD AUTO: 12.4 K/UL — HIGH (ref 1.8–7.4)
NEUTROPHILS NFR BLD AUTO: 81.4 % — HIGH (ref 43–77)
NEUTROPHILS NFR BLD AUTO: 81.4 % — HIGH (ref 43–77)
NITRITE UR-MCNC: NEGATIVE — SIGNIFICANT CHANGE UP
NITRITE UR-MCNC: NEGATIVE — SIGNIFICANT CHANGE UP
NRBC # BLD: 0 /100 WBCS — SIGNIFICANT CHANGE UP (ref 0–0)
NRBC # BLD: 0 /100 WBCS — SIGNIFICANT CHANGE UP (ref 0–0)
NRBC # FLD: 0 K/UL — SIGNIFICANT CHANGE UP (ref 0–0)
NRBC # FLD: 0 K/UL — SIGNIFICANT CHANGE UP (ref 0–0)
NT-PROBNP SERPL-SCNC: 514 PG/ML — HIGH
NT-PROBNP SERPL-SCNC: 514 PG/ML — HIGH
PH UR: 6.5 — SIGNIFICANT CHANGE UP (ref 5–8)
PH UR: 6.5 — SIGNIFICANT CHANGE UP (ref 5–8)
PHOSPHATE SERPL-MCNC: 2.7 MG/DL — SIGNIFICANT CHANGE UP (ref 2.5–4.5)
PHOSPHATE SERPL-MCNC: 2.7 MG/DL — SIGNIFICANT CHANGE UP (ref 2.5–4.5)
PLATELET # BLD AUTO: 342 K/UL — SIGNIFICANT CHANGE UP (ref 150–400)
PLATELET # BLD AUTO: 342 K/UL — SIGNIFICANT CHANGE UP (ref 150–400)
POTASSIUM SERPL-MCNC: 3.1 MMOL/L — LOW (ref 3.5–5.3)
POTASSIUM SERPL-MCNC: 3.1 MMOL/L — LOW (ref 3.5–5.3)
POTASSIUM SERPL-SCNC: 3.1 MMOL/L — LOW (ref 3.5–5.3)
POTASSIUM SERPL-SCNC: 3.1 MMOL/L — LOW (ref 3.5–5.3)
PROT SERPL-MCNC: 6.7 G/DL — SIGNIFICANT CHANGE UP (ref 6–8.3)
PROT SERPL-MCNC: 6.7 G/DL — SIGNIFICANT CHANGE UP (ref 6–8.3)
PROT UR-MCNC: 30 MG/DL
PROT UR-MCNC: 30 MG/DL
PROTHROM AB SERPL-ACNC: 17.7 SEC — HIGH (ref 9.5–13)
PROTHROM AB SERPL-ACNC: 17.7 SEC — HIGH (ref 9.5–13)
RBC # BLD: 3.3 M/UL — LOW (ref 3.8–5.2)
RBC # BLD: 3.3 M/UL — LOW (ref 3.8–5.2)
RBC # FLD: 11.8 % — SIGNIFICANT CHANGE UP (ref 10.3–14.5)
RBC # FLD: 11.8 % — SIGNIFICANT CHANGE UP (ref 10.3–14.5)
RBC CASTS # UR COMP ASSIST: 9 /HPF — HIGH (ref 0–4)
RBC CASTS # UR COMP ASSIST: 9 /HPF — HIGH (ref 0–4)
REVIEW: SIGNIFICANT CHANGE UP
REVIEW: SIGNIFICANT CHANGE UP
SODIUM SERPL-SCNC: 136 MMOL/L — SIGNIFICANT CHANGE UP (ref 135–145)
SODIUM SERPL-SCNC: 136 MMOL/L — SIGNIFICANT CHANGE UP (ref 135–145)
SP GR SPEC: 1.02 — SIGNIFICANT CHANGE UP (ref 1–1.03)
SP GR SPEC: 1.02 — SIGNIFICANT CHANGE UP (ref 1–1.03)
SQUAMOUS # UR AUTO: 8 /HPF — HIGH (ref 0–5)
SQUAMOUS # UR AUTO: 8 /HPF — HIGH (ref 0–5)
TSH SERPL-MCNC: 1.14 UIU/ML — SIGNIFICANT CHANGE UP (ref 0.27–4.2)
TSH SERPL-MCNC: 1.14 UIU/ML — SIGNIFICANT CHANGE UP (ref 0.27–4.2)
UROBILINOGEN FLD QL: 4 MG/DL (ref 0.2–1)
UROBILINOGEN FLD QL: 4 MG/DL (ref 0.2–1)
WBC # BLD: 15.23 K/UL — HIGH (ref 3.8–10.5)
WBC # BLD: 15.23 K/UL — HIGH (ref 3.8–10.5)
WBC # FLD AUTO: 15.23 K/UL — HIGH (ref 3.8–10.5)
WBC # FLD AUTO: 15.23 K/UL — HIGH (ref 3.8–10.5)
WBC UR QL: 11 /HPF — HIGH (ref 0–5)
WBC UR QL: 11 /HPF — HIGH (ref 0–5)

## 2023-10-20 PROCEDURE — 93010 ELECTROCARDIOGRAM REPORT: CPT

## 2023-10-20 PROCEDURE — 99233 SBSQ HOSP IP/OBS HIGH 50: CPT

## 2023-10-20 RX ORDER — MONTELUKAST 4 MG/1
1 TABLET, CHEWABLE ORAL
Refills: 0 | DISCHARGE

## 2023-10-20 RX ORDER — CHOLECALCIFEROL (VITAMIN D3) 125 MCG
1 CAPSULE ORAL
Refills: 0 | DISCHARGE

## 2023-10-20 RX ORDER — NEBIVOLOL HYDROCHLORIDE 5 MG/1
2.5 TABLET ORAL ONCE
Refills: 0 | Status: COMPLETED | OUTPATIENT
Start: 2023-10-21 | End: 2023-10-21

## 2023-10-20 RX ORDER — LANOLIN ALCOHOL/MO/W.PET/CERES
3 CREAM (GRAM) TOPICAL AT BEDTIME
Refills: 0 | Status: COMPLETED | OUTPATIENT
Start: 2023-10-20 | End: 2023-10-20

## 2023-10-20 RX ORDER — MONTELUKAST 4 MG/1
10 TABLET, CHEWABLE ORAL DAILY
Refills: 0 | Status: DISCONTINUED | OUTPATIENT
Start: 2023-10-20 | End: 2023-10-27

## 2023-10-20 RX ORDER — METOPROLOL TARTRATE 50 MG
50 TABLET ORAL EVERY 12 HOURS
Refills: 0 | Status: DISCONTINUED | OUTPATIENT
Start: 2023-10-20 | End: 2023-10-20

## 2023-10-20 RX ORDER — SODIUM CHLORIDE 9 MG/ML
1000 INJECTION INTRAMUSCULAR; INTRAVENOUS; SUBCUTANEOUS
Refills: 0 | Status: DISCONTINUED | OUTPATIENT
Start: 2023-10-20 | End: 2023-10-27

## 2023-10-20 RX ORDER — CALCIUM CARBONATE 500(1250)
0 TABLET ORAL
Refills: 0 | DISCHARGE

## 2023-10-20 RX ORDER — BUDESONIDE AND FORMOTEROL FUMARATE DIHYDRATE 160; 4.5 UG/1; UG/1
2 AEROSOL RESPIRATORY (INHALATION)
Refills: 0 | Status: DISCONTINUED | OUTPATIENT
Start: 2023-10-20 | End: 2023-10-22

## 2023-10-20 RX ORDER — NEBIVOLOL HYDROCHLORIDE 5 MG/1
1 TABLET ORAL
Refills: 0 | DISCHARGE

## 2023-10-20 RX ORDER — SODIUM CHLORIDE 9 MG/ML
1000 INJECTION INTRAMUSCULAR; INTRAVENOUS; SUBCUTANEOUS ONCE
Refills: 0 | Status: COMPLETED | OUTPATIENT
Start: 2023-10-20 | End: 2023-10-20

## 2023-10-20 RX ORDER — CHOLECALCIFEROL (VITAMIN D3) 125 MCG
5000 CAPSULE ORAL DAILY
Refills: 0 | Status: DISCONTINUED | OUTPATIENT
Start: 2023-10-20 | End: 2023-10-27

## 2023-10-20 RX ORDER — ACETAMINOPHEN 500 MG
1000 TABLET ORAL ONCE
Refills: 0 | Status: COMPLETED | OUTPATIENT
Start: 2023-10-20 | End: 2023-10-20

## 2023-10-20 RX ORDER — POTASSIUM CHLORIDE 20 MEQ
40 PACKET (EA) ORAL EVERY 4 HOURS
Refills: 0 | Status: COMPLETED | OUTPATIENT
Start: 2023-10-20 | End: 2023-10-20

## 2023-10-20 RX ORDER — FLUTICASONE PROPIONATE AND SALMETEROL 50; 250 UG/1; UG/1
1 POWDER ORAL; RESPIRATORY (INHALATION)
Refills: 0 | DISCHARGE

## 2023-10-20 RX ORDER — HYDROCHLOROTHIAZIDE 25 MG
1 TABLET ORAL
Refills: 0 | DISCHARGE

## 2023-10-20 RX ORDER — MULTIVIT-MIN/FERROUS GLUCONATE 9 MG/15 ML
1 LIQUID (ML) ORAL
Refills: 0 | DISCHARGE

## 2023-10-20 RX ADMIN — SODIUM CHLORIDE 100 MILLILITER(S): 9 INJECTION INTRAMUSCULAR; INTRAVENOUS; SUBCUTANEOUS at 01:47

## 2023-10-20 RX ADMIN — Medication 100 MILLIGRAM(S): at 16:41

## 2023-10-20 RX ADMIN — Medication 3 MILLIGRAM(S): at 00:59

## 2023-10-20 RX ADMIN — Medication 40 MILLIEQUIVALENT(S): at 16:22

## 2023-10-20 RX ADMIN — Medication 40 MILLIEQUIVALENT(S): at 09:03

## 2023-10-20 RX ADMIN — Medication 1 TABLET(S): at 11:14

## 2023-10-20 RX ADMIN — Medication 650 MILLIGRAM(S): at 02:31

## 2023-10-20 RX ADMIN — CEFTRIAXONE 100 MILLIGRAM(S): 500 INJECTION, POWDER, FOR SOLUTION INTRAMUSCULAR; INTRAVENOUS at 15:41

## 2023-10-20 RX ADMIN — Medication 100 MILLIGRAM(S): at 00:59

## 2023-10-20 RX ADMIN — Medication 1000 MILLIGRAM(S): at 17:50

## 2023-10-20 RX ADMIN — Medication 650 MILLIGRAM(S): at 12:45

## 2023-10-20 RX ADMIN — HEPARIN SODIUM 5000 UNIT(S): 5000 INJECTION INTRAVENOUS; SUBCUTANEOUS at 05:17

## 2023-10-20 RX ADMIN — Medication 650 MILLIGRAM(S): at 00:59

## 2023-10-20 RX ADMIN — HEPARIN SODIUM 5000 UNIT(S): 5000 INJECTION INTRAVENOUS; SUBCUTANEOUS at 17:55

## 2023-10-20 RX ADMIN — SODIUM CHLORIDE 500 MILLILITER(S): 9 INJECTION INTRAMUSCULAR; INTRAVENOUS; SUBCUTANEOUS at 00:38

## 2023-10-20 RX ADMIN — MONTELUKAST 10 MILLIGRAM(S): 4 TABLET, CHEWABLE ORAL at 11:14

## 2023-10-20 RX ADMIN — Medication 5000 UNIT(S): at 12:57

## 2023-10-20 RX ADMIN — Medication 100 MILLIGRAM(S): at 08:35

## 2023-10-20 RX ADMIN — Medication 650 MILLIGRAM(S): at 11:38

## 2023-10-20 RX ADMIN — Medication 400 MILLIGRAM(S): at 16:46

## 2023-10-20 NOTE — PROGRESS NOTE ADULT - SUBJECTIVE AND OBJECTIVE BOX
[facility] Division of [facility] Medicine  Rene Kessler) MD Moe  Pager [phone]    SUBJECTIVE:  Chief complaint: loculated effusion.    Pt seen and evaluated at bedside this AM. No o/n events. Denies any SOB/NV. Only reporting left anterior chest wall pain.       ROS: All systems negative except as noted.      Vital Signs Last 24 Hrs  T(C): 38 (20 Oct 2023 12:45), Max: 39.1 (19 Oct 2023 15:56)  T(F): 100.4 (20 Oct 2023 12:45), Max: 102.4 (19 Oct 2023 15:56)  HR: 94 (20 Oct 2023 12:45) (81 - 99)  BP: 132/79 (20 Oct 2023 12:45) (125/76 - 143/78)  BP(mean): --  RR: 18 (20 Oct 2023 12:45) (17 - 20)  SpO2: 93% (20 Oct 2023 12:45) (93% - 100%)    Parameters below as of 20 Oct 2023 12:45  Patient On (Oxygen Delivery Method): room air      PHYSICAL EXAM:  Gen- In bed, well-appearing, NAD. Speaks full sentences  Resp- CTAB, diminished at left base. no r/r/w.  CVS- RRR, S1S2, no g/r/m.  GI- Soft abd, NT, ND, +BSx4  Ext- No C/C.       MEDICATION:  MEDICATIONS  (STANDING):  budesonide 160 MICROgram(s)/formoterol 4.5 MICROgram(s) Inhaler 2 Puff(s) Inhalation two times a day  cefTRIAXone   IVPB 1000 milliGRAM(s) IV Intermittent every 24 hours  cholecalciferol 5000 Unit(s) Oral daily  heparin   Injectable 5000 Unit(s) SubCutaneous every 12 hours  metoprolol tartrate 50 milliGRAM(s) Oral every 12 hours  metroNIDAZOLE  IVPB 500 milliGRAM(s) IV Intermittent every 8 hours  montelukast 10 milliGRAM(s) Oral daily  multivitamin 1 Tablet(s) Oral daily  potassium chloride   Powder 40 milliEquivalent(s) Oral every 4 hours  sodium chloride 0.9%. 1000 milliLiter(s) (100 mL/Hr) IV Continuous <Continuous>    MEDICATIONS  (PRN):  acetaminophen     Tablet .. 650 milliGRAM(s) Oral every 6 hours PRN Temp greater or equal to 38C (100.4F), Mild Pain (1 - 3)  albuterol/ipratropium for Nebulization 3 milliLiter(s) Nebulizer every 6 hours PRN Shortness of Breath and/or Wheezing  bisacodyl 5 milliGRAM(s) Oral every 12 hours PRN Constipation  melatonin 3 milliGRAM(s) Oral at bedtime PRN Insomnia            LABORATORY:                          10.4   15.23 )-----------( 342      ( 20 Oct 2023 07:23 )             30.9     10-20    136  |  100  |  6<L>  ----------------------------<  106<H>  3.1<L>   |  24  |  0.49<L>    Ca    8.8      20 Oct 2023 07:23  Phos  2.7     10-20  Mg     2.00     10-20    TPro  6.7  /  Alb  2.8<L>  /  TBili  1.9<H>  /  DBili  x   /  AST  40<H>  /  ALT  36<H>  /  AlkPhos  149<H>  10-20    PT/INR - ( 20 Oct 2023 07:23 )   PT: 17.7 sec;   INR: 1.60 ratio         PTT - ( 20 Oct 2023 07:23 )  PTT:33.0 sec  Urinalysis Basic - ( 20 Oct 2023 07:23 )    Color: x / Appearance: x / SG: x / pH: x  Gluc: 106 mg/dL / Ketone: x  / Bili: x / Urobili: x   Blood: x / Protein: x / Nitrite: x   Leuk Esterase: x / RBC: x / WBC x   Sq Epi: x / Non Sq Epi: x / Bacteria: x            SARS-CoV-2: NotDetec (19 Oct 2023 16:41)

## 2023-10-20 NOTE — PROGRESS NOTE ADULT - PROBLEM SELECTOR PLAN 4
- ECG = sinus rhythm w/ frequent PVCs at 97 bpm, QTc = 515, RBBB, TWI in III  - no prior study for comparison  - TSH = 1.14 (AM lab-work)  - F/u repeat EKG. - No recent cards f/u. Known RBBB per pt. However, we don't have any older EKGs for comp.  - EKG w/o ischemic changes.  - Maintain lytes. K>4, Mg>2  - On bystolic at home - can continue metoprolol while here in hospital.  - Will follow up with Dr Funes (after DC).

## 2023-10-20 NOTE — CONSULT NOTE ADULT - SUBJECTIVE AND OBJECTIVE BOX
Interventional Radiology    Evaluate for Procedure: Thoracentesis     HPI:   53 year old female, with past history significant for Asthma, SVT, HNP, Migraine presented to the ED secondary to fever.  Seen and evaluated at bedside; NAD.  Patient reports sudden onset of left sided chest pain one week ago and saw her PCP who suspected the cause to be costochondritis - especially since patient had undergone some stretching exercises and was s/p use of a vibrating gun just prior to onset.  Pain begins at the ribs below the left breast and tends to shoot upward the chest to the neck; extremely sharp pain and is exacerbated by walking, coughing and deep inspiration.  However, although patient used meloxicam, flexeril and Ibuprofen, pain and discomfort did not improve.  Patient began to suffer with fever (max of 102.5 F), which would decrease at night, but return in the mornings, chills, and coughing.  CXR was subsequently done, and patient was sent to the ED for further evaluation.    Vital signs upon ED presentation as follows: BP = 125/76, HR = 99, RR = 20, T = 38.1 to 39.1 C (100.5 to 102.4 F), O2 Sat = 95% on RA.  Diagnosed with Pleural Effusion and prescribed ceftriaxone 1 gram, metronidazole 500 mg, NaCl one liter, Tylenol 650 mg, and Potassium chloride 40 meq x one in the ED. (19 Oct 2023 21:18)      PAST MEDICAL HISTORY:  53y    PAST SURGICAL HISTORY:  Female    MEDICATIONS:  levofloxacin (Rash)  penicillin (Rash)  clarithromycin (Rash)  doxycycline (Rash)      ALLERGIES:    cefTRIAXone   IVPB: 100 mL/Hr IV Intermittent (10-19 @ 16:32)  cefTRIAXone   IVPB: 100 mL/Hr IV Intermittent (10-20 @ 15:41)  heparin   Injectable: 5000 Unit(s) SubCutaneous (10-20 @ 17:55)  metroNIDAZOLE  IVPB: 100 mL/Hr IV Intermittent (10-20 @ 16:41)  metroNIDAZOLE  IVPB: 100 mL/Hr IV Intermittent (10-19 @ 17:25)      VITALS & I/Os:  Vital Signs Last 24 Hrs  T(C): 38.1 (20 Oct 2023 17:50), Max: 38.9 (20 Oct 2023 16:08)  T(F): 100.5 (20 Oct 2023 17:50), Max: 102 (20 Oct 2023 16:08)  HR: 91 (20 Oct 2023 17:50) (81 - 94)  BP: 134/63 (20 Oct 2023 17:50) (127/74 - 143/78)  BP(mean): --  RR: 18 (20 Oct 2023 17:50) (17 - 18)  SpO2: 93% (20 Oct 2023 17:50) (93% - 100%)    Parameters below as of 20 Oct 2023 17:50  Patient On (Oxygen Delivery Method): room air        I&O's Summary      Allergies: levofloxacin (Rash)  penicillin (Rash)  clarithromycin (Rash)  doxycycline (Rash)    Medications (Abx/Cardiac/Anticoagulation/Blood Products)    cefTRIAXone   IVPB: 100 mL/Hr IV Intermittent (10-19 @ 16:32)  cefTRIAXone   IVPB: 100 mL/Hr IV Intermittent (10-20 @ 15:41)  heparin   Injectable: 5000 Unit(s) SubCutaneous (10-20 @ 17:55)  metroNIDAZOLE  IVPB: 100 mL/Hr IV Intermittent (10-20 @ 16:41)  metroNIDAZOLE  IVPB: 100 mL/Hr IV Intermittent (10-19 @ 17:25)    Data:  182.9  126.9  T(C): 38.1  HR: 91  BP: 134/63  RR: 18  SpO2: 93%    -WBC 15.23 / HgB 10.4 / Hct 30.9 / Plt 342  -Na 136 / Cl 100 / BUN 6 / Glucose 106  -K 3.1 / CO2 24 / Cr 0.49  -ALT 36 / Alk Phos 149 / T.Bili 1.9  -INR 1.60 / PTT 33.0    Radiology:     Assessment/Plan:   53 year old female, with past history significant for Asthma, SVT, HNP, Migraine presented to the ED secondary to fever.  Diagnosed with Pleural Effusion in the ED. IR consulted for thoracetnesis per d/w Pulmonology. CT reviewed--large left plerual effusion which may be amenable to thoracentesis by the pulmonology team. If chest tube is required a CT-guided percutaneous chest tube can be attempted. However, if thoracentesis to improve respriatory status and speciate the organism please reach out to pulmonology to attempt thoracetnesis. If chest tube needed please reconsult.     -case d/w Dr. Vanegas

## 2023-10-20 NOTE — CHART NOTE - NSCHARTNOTEFT_GEN_A_CORE
[facility]    Notified by RN that pt declined to take Metoprolol, pt stated it does not help her heart palpitations and she takes Bystolic 5mg every morning.  Spoke to pt at bedside.  Pt requested to take Bystolic while in the hospital if possible.  She said it's the only medication that has worked for her in regard to suppressing any PVCs and heart palpitations that she has experienced.  Denies any heart palpitations or chest pain presently.  Pt said her family member can bring in her home Bystolic if needed for verification.  Explained that we can order a dose of Bystolic 2.5mg x1 for the AM given that she is being treated for an infection and we would want to ensure it is tolerated well.    Can clarify with attending physician in AM to determine if Bystolic 5mg can be continued.  Pt in agreement w/ above, Metoprolol order d/josephine.  Spoke w/ pharmacy regarding Bystolic order.    Pt instructed to notify RN should she experience heart palpitations, or rapid heart rate - pt endorsed understanding.  D/w RN.  Will d/w day provider in AM.  Will continue to monitor.    Vital Signs Last 24 Hrs  T(C): 37.8 (20 Oct 2023 22:17), Max: 38.9 (20 Oct 2023 16:08)  T(F): 100 (20 Oct 2023 22:17), Max: 102 (20 Oct 2023 16:08)  HR: 89 (20 Oct 2023 22:17) (86 - 94)  BP: 136/59 (20 Oct 2023 22:17) (118/65 - 143/78)  RR: 18 (20 Oct 2023 22:17) (17 - 18)  SpO2: 98% (20 Oct 2023 22:17) (93% - 100%)  O2 Parameters below as of 20 Oct 2023 22:17  Patient On (Oxygen Delivery Method): room air    Wan Camacho PA-C  Department of Medicine - [facility]  In-House Pager: #[phone]

## 2023-10-20 NOTE — PATIENT PROFILE ADULT - FALL HARM RISK - UNIVERSAL INTERVENTIONS
Bed in lowest position, wheels locked, appropriate side rails in place/Call bell, personal items and telephone in reach/Instruct patient to call for assistance before getting out of bed or chair/Non-slip footwear when patient is out of bed/Odd to call system/Physically safe environment - no spills, clutter or unnecessary equipment/Purposeful Proactive Rounding/Room/bathroom lighting operational, light cord in reach

## 2023-10-21 LAB
A1C WITH ESTIMATED AVERAGE GLUCOSE RESULT: 4.8 % — SIGNIFICANT CHANGE UP (ref 4–5.6)
A1C WITH ESTIMATED AVERAGE GLUCOSE RESULT: 4.8 % — SIGNIFICANT CHANGE UP (ref 4–5.6)
ANION GAP SERPL CALC-SCNC: 13 MMOL/L — SIGNIFICANT CHANGE UP (ref 7–14)
ANION GAP SERPL CALC-SCNC: 13 MMOL/L — SIGNIFICANT CHANGE UP (ref 7–14)
BUN SERPL-MCNC: 5 MG/DL — LOW (ref 7–23)
BUN SERPL-MCNC: 5 MG/DL — LOW (ref 7–23)
CALCIUM SERPL-MCNC: 9.1 MG/DL — SIGNIFICANT CHANGE UP (ref 8.4–10.5)
CALCIUM SERPL-MCNC: 9.1 MG/DL — SIGNIFICANT CHANGE UP (ref 8.4–10.5)
CHLORIDE SERPL-SCNC: 101 MMOL/L — SIGNIFICANT CHANGE UP (ref 98–107)
CHLORIDE SERPL-SCNC: 101 MMOL/L — SIGNIFICANT CHANGE UP (ref 98–107)
CHOLEST SERPL-MCNC: 92 MG/DL — SIGNIFICANT CHANGE UP
CHOLEST SERPL-MCNC: 92 MG/DL — SIGNIFICANT CHANGE UP
CO2 SERPL-SCNC: 23 MMOL/L — SIGNIFICANT CHANGE UP (ref 22–31)
CO2 SERPL-SCNC: 23 MMOL/L — SIGNIFICANT CHANGE UP (ref 22–31)
CREAT SERPL-MCNC: 0.45 MG/DL — LOW (ref 0.5–1.3)
CREAT SERPL-MCNC: 0.45 MG/DL — LOW (ref 0.5–1.3)
EGFR: 116 ML/MIN/1.73M2 — SIGNIFICANT CHANGE UP
EGFR: 116 ML/MIN/1.73M2 — SIGNIFICANT CHANGE UP
ESTIMATED AVERAGE GLUCOSE: 91 — SIGNIFICANT CHANGE UP
ESTIMATED AVERAGE GLUCOSE: 91 — SIGNIFICANT CHANGE UP
GLUCOSE SERPL-MCNC: 99 MG/DL — SIGNIFICANT CHANGE UP (ref 70–99)
GLUCOSE SERPL-MCNC: 99 MG/DL — SIGNIFICANT CHANGE UP (ref 70–99)
HCT VFR BLD CALC: 31.6 % — LOW (ref 34.5–45)
HCT VFR BLD CALC: 31.6 % — LOW (ref 34.5–45)
HDLC SERPL-MCNC: 19 MG/DL — LOW
HDLC SERPL-MCNC: 19 MG/DL — LOW
HGB BLD-MCNC: 10.5 G/DL — LOW (ref 11.5–15.5)
HGB BLD-MCNC: 10.5 G/DL — LOW (ref 11.5–15.5)
LIPID PNL WITH DIRECT LDL SERPL: 60 MG/DL — SIGNIFICANT CHANGE UP
LIPID PNL WITH DIRECT LDL SERPL: 60 MG/DL — SIGNIFICANT CHANGE UP
MAGNESIUM SERPL-MCNC: 2.1 MG/DL — SIGNIFICANT CHANGE UP (ref 1.6–2.6)
MAGNESIUM SERPL-MCNC: 2.1 MG/DL — SIGNIFICANT CHANGE UP (ref 1.6–2.6)
MCHC RBC-ENTMCNC: 30.9 PG — SIGNIFICANT CHANGE UP (ref 27–34)
MCHC RBC-ENTMCNC: 30.9 PG — SIGNIFICANT CHANGE UP (ref 27–34)
MCHC RBC-ENTMCNC: 33.2 GM/DL — SIGNIFICANT CHANGE UP (ref 32–36)
MCHC RBC-ENTMCNC: 33.2 GM/DL — SIGNIFICANT CHANGE UP (ref 32–36)
MCV RBC AUTO: 92.9 FL — SIGNIFICANT CHANGE UP (ref 80–100)
MCV RBC AUTO: 92.9 FL — SIGNIFICANT CHANGE UP (ref 80–100)
MRSA PCR RESULT.: SIGNIFICANT CHANGE UP
MRSA PCR RESULT.: SIGNIFICANT CHANGE UP
NON HDL CHOLESTEROL: 73 MG/DL — SIGNIFICANT CHANGE UP
NON HDL CHOLESTEROL: 73 MG/DL — SIGNIFICANT CHANGE UP
NRBC # BLD: 0 /100 WBCS — SIGNIFICANT CHANGE UP (ref 0–0)
NRBC # BLD: 0 /100 WBCS — SIGNIFICANT CHANGE UP (ref 0–0)
NRBC # FLD: 0 K/UL — SIGNIFICANT CHANGE UP (ref 0–0)
NRBC # FLD: 0 K/UL — SIGNIFICANT CHANGE UP (ref 0–0)
PHOSPHATE SERPL-MCNC: 2.7 MG/DL — SIGNIFICANT CHANGE UP (ref 2.5–4.5)
PHOSPHATE SERPL-MCNC: 2.7 MG/DL — SIGNIFICANT CHANGE UP (ref 2.5–4.5)
PLATELET # BLD AUTO: 373 K/UL — SIGNIFICANT CHANGE UP (ref 150–400)
PLATELET # BLD AUTO: 373 K/UL — SIGNIFICANT CHANGE UP (ref 150–400)
POTASSIUM SERPL-MCNC: 3.6 MMOL/L — SIGNIFICANT CHANGE UP (ref 3.5–5.3)
POTASSIUM SERPL-MCNC: 3.6 MMOL/L — SIGNIFICANT CHANGE UP (ref 3.5–5.3)
POTASSIUM SERPL-SCNC: 3.6 MMOL/L — SIGNIFICANT CHANGE UP (ref 3.5–5.3)
POTASSIUM SERPL-SCNC: 3.6 MMOL/L — SIGNIFICANT CHANGE UP (ref 3.5–5.3)
RBC # BLD: 3.4 M/UL — LOW (ref 3.8–5.2)
RBC # BLD: 3.4 M/UL — LOW (ref 3.8–5.2)
RBC # FLD: 11.9 % — SIGNIFICANT CHANGE UP (ref 10.3–14.5)
RBC # FLD: 11.9 % — SIGNIFICANT CHANGE UP (ref 10.3–14.5)
S AUREUS DNA NOSE QL NAA+PROBE: DETECTED
S AUREUS DNA NOSE QL NAA+PROBE: DETECTED
S PNEUM AG UR QL: NEGATIVE — SIGNIFICANT CHANGE UP
S PNEUM AG UR QL: NEGATIVE — SIGNIFICANT CHANGE UP
SODIUM SERPL-SCNC: 137 MMOL/L — SIGNIFICANT CHANGE UP (ref 135–145)
SODIUM SERPL-SCNC: 137 MMOL/L — SIGNIFICANT CHANGE UP (ref 135–145)
TRIGL SERPL-MCNC: 67 MG/DL — SIGNIFICANT CHANGE UP
TRIGL SERPL-MCNC: 67 MG/DL — SIGNIFICANT CHANGE UP
WBC # BLD: 16.46 K/UL — HIGH (ref 3.8–10.5)
WBC # BLD: 16.46 K/UL — HIGH (ref 3.8–10.5)
WBC # FLD AUTO: 16.46 K/UL — HIGH (ref 3.8–10.5)
WBC # FLD AUTO: 16.46 K/UL — HIGH (ref 3.8–10.5)

## 2023-10-21 PROCEDURE — 99233 SBSQ HOSP IP/OBS HIGH 50: CPT

## 2023-10-21 PROCEDURE — 99254 IP/OBS CNSLTJ NEW/EST MOD 60: CPT | Mod: 57

## 2023-10-21 PROCEDURE — 76705 ECHO EXAM OF ABDOMEN: CPT | Mod: 26

## 2023-10-21 RX ORDER — CEFEPIME 1 G/1
2000 INJECTION, POWDER, FOR SOLUTION INTRAMUSCULAR; INTRAVENOUS ONCE
Refills: 0 | Status: COMPLETED | OUTPATIENT
Start: 2023-10-21 | End: 2023-10-21

## 2023-10-21 RX ORDER — NEBIVOLOL HYDROCHLORIDE 5 MG/1
2.5 TABLET ORAL ONCE
Refills: 0 | Status: COMPLETED | OUTPATIENT
Start: 2023-10-21 | End: 2023-10-21

## 2023-10-21 RX ORDER — CEFEPIME 1 G/1
1000 INJECTION, POWDER, FOR SOLUTION INTRAMUSCULAR; INTRAVENOUS EVERY 8 HOURS
Refills: 0 | Status: DISCONTINUED | OUTPATIENT
Start: 2023-10-21 | End: 2023-10-21

## 2023-10-21 RX ORDER — CEFEPIME 1 G/1
INJECTION, POWDER, FOR SOLUTION INTRAMUSCULAR; INTRAVENOUS
Refills: 0 | Status: DISCONTINUED | OUTPATIENT
Start: 2023-10-21 | End: 2023-10-21

## 2023-10-21 RX ORDER — CEFEPIME 1 G/1
1000 INJECTION, POWDER, FOR SOLUTION INTRAMUSCULAR; INTRAVENOUS ONCE
Refills: 0 | Status: DISCONTINUED | OUTPATIENT
Start: 2023-10-21 | End: 2023-10-21

## 2023-10-21 RX ORDER — CEFEPIME 1 G/1
2000 INJECTION, POWDER, FOR SOLUTION INTRAMUSCULAR; INTRAVENOUS EVERY 8 HOURS
Refills: 0 | Status: DISCONTINUED | OUTPATIENT
Start: 2023-10-21 | End: 2023-10-22

## 2023-10-21 RX ORDER — CEFEPIME 1 G/1
INJECTION, POWDER, FOR SOLUTION INTRAMUSCULAR; INTRAVENOUS
Refills: 0 | Status: DISCONTINUED | OUTPATIENT
Start: 2023-10-21 | End: 2023-10-22

## 2023-10-21 RX ORDER — POTASSIUM CHLORIDE 20 MEQ
40 PACKET (EA) ORAL EVERY 4 HOURS
Refills: 0 | Status: COMPLETED | OUTPATIENT
Start: 2023-10-21 | End: 2023-10-21

## 2023-10-21 RX ORDER — NEBIVOLOL HYDROCHLORIDE 5 MG/1
5 TABLET ORAL DAILY
Refills: 0 | Status: DISCONTINUED | OUTPATIENT
Start: 2023-10-22 | End: 2023-10-27

## 2023-10-21 RX ORDER — MUPIROCIN 20 MG/G
1 OINTMENT TOPICAL
Refills: 0 | Status: COMPLETED | OUTPATIENT
Start: 2023-10-21 | End: 2023-10-26

## 2023-10-21 RX ORDER — BUDESONIDE AND FORMOTEROL FUMARATE DIHYDRATE 160; 4.5 UG/1; UG/1
2 AEROSOL RESPIRATORY (INHALATION)
Refills: 0 | Status: DISCONTINUED | OUTPATIENT
Start: 2023-10-21 | End: 2023-10-22

## 2023-10-21 RX ORDER — ACETAMINOPHEN 500 MG
1000 TABLET ORAL ONCE
Refills: 0 | Status: COMPLETED | OUTPATIENT
Start: 2023-10-21 | End: 2023-10-21

## 2023-10-21 RX ADMIN — HEPARIN SODIUM 5000 UNIT(S): 5000 INJECTION INTRAVENOUS; SUBCUTANEOUS at 05:20

## 2023-10-21 RX ADMIN — Medication 650 MILLIGRAM(S): at 22:10

## 2023-10-21 RX ADMIN — NEBIVOLOL HYDROCHLORIDE 2.5 MILLIGRAM(S): 5 TABLET ORAL at 05:21

## 2023-10-21 RX ADMIN — Medication 40 MILLIEQUIVALENT(S): at 13:42

## 2023-10-21 RX ADMIN — CEFEPIME 100 MILLIGRAM(S): 1 INJECTION, POWDER, FOR SOLUTION INTRAMUSCULAR; INTRAVENOUS at 11:02

## 2023-10-21 RX ADMIN — Medication 1 TABLET(S): at 13:20

## 2023-10-21 RX ADMIN — NEBIVOLOL HYDROCHLORIDE 2.5 MILLIGRAM(S): 5 TABLET ORAL at 11:02

## 2023-10-21 RX ADMIN — CEFEPIME 100 MILLIGRAM(S): 1 INJECTION, POWDER, FOR SOLUTION INTRAMUSCULAR; INTRAVENOUS at 21:27

## 2023-10-21 RX ADMIN — Medication 400 MILLIGRAM(S): at 01:17

## 2023-10-21 RX ADMIN — Medication 1000 MILLIGRAM(S): at 02:00

## 2023-10-21 RX ADMIN — Medication 650 MILLIGRAM(S): at 21:40

## 2023-10-21 RX ADMIN — Medication 40 MILLIEQUIVALENT(S): at 17:04

## 2023-10-21 RX ADMIN — Medication 100 MILLIGRAM(S): at 00:56

## 2023-10-21 RX ADMIN — HEPARIN SODIUM 5000 UNIT(S): 5000 INJECTION INTRAVENOUS; SUBCUTANEOUS at 17:04

## 2023-10-21 RX ADMIN — Medication 650 MILLIGRAM(S): at 16:59

## 2023-10-21 RX ADMIN — Medication 5000 UNIT(S): at 17:05

## 2023-10-21 RX ADMIN — Medication 650 MILLIGRAM(S): at 14:50

## 2023-10-21 RX ADMIN — MONTELUKAST 10 MILLIGRAM(S): 4 TABLET, CHEWABLE ORAL at 13:20

## 2023-10-21 NOTE — CHART NOTE - NSCHARTNOTEFT_GEN_A_CORE
IR Follow-Up     discussed case with thoracic surgery ; attempt was made by them to place a chest tube but they felt collection was too small for them to safely perform the procedure at bedside.    IR can attempt CT-guided left chest tube placement for small left effusion on Monday 10/23 it still desired by team for clinical improvement.    NPO at midnight Sunday night and hold a.m. anticoagulation on Monday morning.    Please write pre-procedure note and place IR procedure order under Dr. Vanegas.    --  Chalino Licea DO/[place]  Interventional Radiology Resident (PGY-5)  Available on [employer] TEAMS    For EMERGENT inquiries/questions:  IR Pager ([facility]): [phone]  IR Pager ([facility]): [phone] ; [phone]    For non-emergent consults/questions:   Please place a sunrise order "Consult- Interventional Radiology" with an appropriate callback number    For questions about scheduling during appropriate work hours, call IR :  [facility]: [phone]  [facility]: [phone]    For outpatient IR booking:  [facility]: [phone]  [facility]: [phone]

## 2023-10-21 NOTE — CONSULT NOTE ADULT - NS ATTEND AMEND GEN_ALL_CORE FT
patient with chest pain and noted to have consolidated left lower lobe w/ small loculated collection.   attempted US for bedside pigtail  but very small pocket - not safe for bedside placement -   would recommend IR guided pigtail  and will f/u on cx. given extent of consolidation - would recommend pigtail and abx treatment for now.

## 2023-10-21 NOTE — CONSULT NOTE ADULT - ASSESSMENT
53 year old female, with past history significant for Asthma, SVT, HNP, Migraine presented to the ED secondary to fever. Found to have left pleural effusion with consolidation of left Lower lobe

## 2023-10-21 NOTE — PROGRESS NOTE ADULT - SUBJECTIVE AND OBJECTIVE BOX
PULMONARY PROGRESS NOTE    AKILAH MARQUEZ  MRN-[id number]    Patient is a 53y old  Female who presents with a chief complaint of Sepsis due to undetermined organism, Left lower lobe pneumonia, Loculate pleural effusion (20 Oct 2023 18:28)      HPI:  -  -    ROS:   -    ACTIVE MEDICATION LIST:  MEDICATIONS  (STANDING):  budesonide 160 MICROgram(s)/formoterol 4.5 MICROgram(s) Inhaler 2 Puff(s) Inhalation two times a day  cefTRIAXone   IVPB 1000 milliGRAM(s) IV Intermittent every 24 hours  cholecalciferol 5000 Unit(s) Oral daily  heparin   Injectable 5000 Unit(s) SubCutaneous every 12 hours  hydrochlorothiazide 12.5 milliGRAM(s) Oral daily  metroNIDAZOLE  IVPB 500 milliGRAM(s) IV Intermittent every 8 hours  montelukast 10 milliGRAM(s) Oral daily  multivitamin 1 Tablet(s) Oral daily  sodium chloride 0.9%. 1000 milliLiter(s) (100 mL/Hr) IV Continuous <Continuous>    MEDICATIONS  (PRN):  acetaminophen     Tablet .. 650 milliGRAM(s) Oral every 6 hours PRN Temp greater or equal to 38C (100.4F), Mild Pain (1 - 3)  albuterol/ipratropium for Nebulization 3 milliLiter(s) Nebulizer every 6 hours PRN Shortness of Breath and/or Wheezing  bisacodyl 5 milliGRAM(s) Oral every 12 hours PRN Constipation  melatonin 3 milliGRAM(s) Oral at bedtime PRN Insomnia      EXAM:  Vital Signs Last 24 Hrs  T(C): 37.2 (21 Oct 2023 04:55), Max: 39 (21 Oct 2023 01:05)  T(F): 99 (21 Oct 2023 04:55), Max: 102.2 (21 Oct 2023 01:05)  HR: 79 (21 Oct 2023 04:55) (79 - 94)  BP: 144/77 (21 Oct 2023 04:55) (118/65 - 144/77)  BP(mean): --  RR: 18 (21 Oct 2023 04:55) (18 - 18)  SpO2: 96% (21 Oct 2023 04:55) (93% - 100%)    Parameters below as of 21 Oct 2023 04:55  Patient On (Oxygen Delivery Method): room air        GENERAL: The patient is awake and alert in no apparent distress.     LUNGS: Clear to auscultation without wheezing, rales or rhonchi; respirations unlabored    HEART: Regular rate and rhythm without murmur.                            10.5   16.46 )-----------( 373      ( 21 Oct 2023 06:41 )             31.6       10-20    136  |  100  |  6<L>  ----------------------------<  106<H>  3.1<L>   |  24  |  0.49<L>    Ca    8.8      20 Oct 2023 07:23  Phos  2.7     10-20  Mg     2.00     10-20    TPro  6.7  /  Alb  2.8<L>  /  TBili  1.9<H>  /  DBili  x   /  AST  40<H>  /  ALT  36<H>  /  AlkPhos  149<H>  10-20    >>> <<<    PROBLEM LIST:  53y Female with HEALTH ISSUES - PROBLEM Dx:  LLL pneumonia    Hypoalbuminemia    Sepsis due to undetermined organism    Prophylactic measure    ECG abnormality    Dehydration with hyponatremia    Loculated pleural effusion    Elevated bilirubin    SVT (supraventricular tachycardia)              RECS:        Please call with any questions.    Elizabeth Rees DO  [facility] Pulmonary/Sleep Medicine  [phone]   PULMONARY PROGRESS NOTE    AKILAH MARQUEZ  MRN-[id number]    Patient is a 53y old  Female who presents with a chief complaint of Sepsis due to undetermined organism, Left lower lobe pneumonia, Loculate pleural effusion (20 Oct 2023 18:28)      HPI:  -52 yo female who sees DR Trust for asthma on albuterol prn,a dvair & singulair admitted for chest pain, cough and fever. never smoker. + second hand smoke epxosure. not on 02 at home  no recent asthma flares    seen today on room air + pleuritic pain + limited ability to take deep breaths + cough dry   trying to use IS  febrile overnight         -    ROS:   -    ACTIVE MEDICATION LIST:  MEDICATIONS  (STANDING):  budesonide 160 MICROgram(s)/formoterol 4.5 MICROgram(s) Inhaler 2 Puff(s) Inhalation two times a day  cefTRIAXone   IVPB 1000 milliGRAM(s) IV Intermittent every 24 hours  cholecalciferol 5000 Unit(s) Oral daily  heparin   Injectable 5000 Unit(s) SubCutaneous every 12 hours  hydrochlorothiazide 12.5 milliGRAM(s) Oral daily  metroNIDAZOLE  IVPB 500 milliGRAM(s) IV Intermittent every 8 hours  montelukast 10 milliGRAM(s) Oral daily  multivitamin 1 Tablet(s) Oral daily  sodium chloride 0.9%. 1000 milliLiter(s) (100 mL/Hr) IV Continuous <Continuous>    MEDICATIONS  (PRN):  acetaminophen     Tablet .. 650 milliGRAM(s) Oral every 6 hours PRN Temp greater or equal to 38C (100.4F), Mild Pain (1 - 3)  albuterol/ipratropium for Nebulization 3 milliLiter(s) Nebulizer every 6 hours PRN Shortness of Breath and/or Wheezing  bisacodyl 5 milliGRAM(s) Oral every 12 hours PRN Constipation  melatonin 3 milliGRAM(s) Oral at bedtime PRN Insomnia      EXAM:  Vital Signs Last 24 Hrs  T(C): 37.2 (21 Oct 2023 04:55), Max: 39 (21 Oct 2023 01:05)  T(F): 99 (21 Oct 2023 04:55), Max: 102.2 (21 Oct 2023 01:05)  HR: 79 (21 Oct 2023 04:55) (79 - 94)  BP: 144/77 (21 Oct 2023 04:55) (118/65 - 144/77)  BP(mean): --  RR: 18 (21 Oct 2023 04:55) (18 - 18)  SpO2: 96% (21 Oct 2023 04:55) (93% - 100%)    Parameters below as of 21 Oct 2023 04:55  Patient On (Oxygen Delivery Method): room air        GENERAL: The patient is awake and alert in no apparent distress.     LUNGS: not labored  not wheezing  some crackles at bases, limited due to effort                             10.5   16.46 )-----------( 373      ( 21 Oct 2023 06:41 )             31.6       10-20    136  |  100  |  6<L>  ----------------------------<  106<H>  3.1<L>   |  24  |  0.49<L>    Ca    8.8      20 Oct 2023 07:23  Phos  2.7     10-20  Mg     2.00     10-20    TPro  6.7  /  Alb  2.8<L>  /  TBili  1.9<H>  /  DBili  x   /  AST  40<H>  /  ALT  36<H>  /  AlkPhos  149<H>  10-20     < from: CT Chest No Cont (10.19.23 @ 17:53) >    ACC: [id number] EXAM:  CT CHEST   ORDERED BY: RYDER CHAMORRO     PROCEDURE DATE:  10/19/2023          INTERPRETATION:  CLINICAL INFORMATION: Left pleural effusion    COMPARISON: Chest CT 1/14/2015, chest x-ray 10/19/2023    CONTRAST/COMPLICATIONS:  IV Contrast: None  Oral Contrast: None  Complications: None reported    PROCEDURE:  CT scan of the chest was obtained without intravenous contrast.    FINDINGS:    Trachea and mainstem bronchi patent. Lingular and left basilar   parenchymal opacification. Moderate size loculated left pleural effusion   and associated mediastinal pleural thickening (for example image 59,   series 2).    Right lower lobe superior segment 2.5 x 2 cm nodular consolidative   opacity (image 79, series 2).    Few mildly enlarged mediastinal nodes; reference precarinal 1.9 x 1.2 cm   node (image 57, series 2). Left internal mammary node measures 0.7 cm   (image 65, series 2).    Heart size normal. No pericardial effusion. Coronary calcifications.   Normal caliber thoracic aorta.    Cholecystectomy. Soft tissues and osseous structures unremarkable.      IMPRESSION:    Right lower lobe superior segment 2.5 cm nodular consolidative opacity;   differential diagnosis includes infection and primary lung malignancy.   Recommend CT chest follow-up in one month to help differentiate between   these two etiologies.    Moderate-sized loculated left pleural effusion with associated   mediastinal pleural thickening. The exact etiology is unclear, but   malignancy is a diagnostic consideration.    Lingular and left lower lobe parenchymal opacification may represent   atelectasis or pneumonia.    Mildly enlarged mediastinal nodes and left internal mammary node are   indeterminate and recommend attention on CT chest follow-up.    --- End of Report ---          JIMI WEAVER MD; Resident Radiologist  This document has been electronically signed.  JOSELYN SALTER MD; Attending Radiologist  This document has been electronically signed. Oct 19 2023  6:19PM    < end of copied text >    PROBLEM LIST:  53y Female with HEALTH ISSUES - PROBLEM Dx:  LLL pneumonia    Hypoalbuminemia    Sepsis due to undetermined organism    Prophylactic measure    ECG abnormality    Dehydration with hyponatremia    Loculated pleural effusion    Elevated bilirubin    SVT (supraventricular tachycardia)              RECS:  despite IV abx , febrile  suggest ID eval and broader abx coverage- zosyn?   f/u cultures  use IS  symbicort interage for home advair  albuterol PRN  restart singulair  IR note reviewed- given the loculation/ fever/ wbc would suggset this may be more infection vs. malignancy  will speak to CTS re: pigtail  she will need closef /u with Dr Brown outpatient including close f/u on this ct chest    will follow    Please call with any questions.    Elizabeth Rees,   [facility] Pulmonary/Sleep Medicine  [phone]

## 2023-10-21 NOTE — PROGRESS NOTE ADULT - PROBLEM SELECTOR PLAN 4
- No recent cards f/u. Known RBBB per pt. However, we don't have any older EKGs for comp.  - EKG w/o ischemic changes.  - Maintain lytes. K>4, Mg>2  - Continue bystolic 5 daily.  - Will follow up with Dr Funes (after DC).

## 2023-10-21 NOTE — CONSULT NOTE ADULT - SUBJECTIVE AND OBJECTIVE BOX
HPI:  53 year old female, with past history significant for Asthma, SVT, HNP, Migraine presented to the ED secondary to fever.  Seen and evaluated at bedside; NAD.  Patient reports sudden onset of left sided chest pain one week ago and saw her PCP who suspected the cause to be costochondritis - especially since patient had undergone some stretching exercises and was s/p use of a vibrating gun just prior to onset.  Pain begins at the ribs below the left breast and tends to shoot upward the chest to the neck; extremely sharp pain and is exacerbated by walking, coughing and deep inspiration.  However, although patient used meloxicam, flexeril and Ibuprofen, pain and discomfort did not improve.  Patient began to suffer with fever (max of 102.5 F), which would decrease at night, but return in the mornings, chills, and coughing.  CXR was subsequently done, and patient was sent to the ED for further evaluation.    Vital signs upon ED presentation as follows: BP = 125/76, HR = 99, RR = 20, T = 38.1 to 39.1 C (100.5 to 102.4 F), O2 Sat = 95% on RA.  Diagnosed with Pleural Effusion and prescribed ceftriaxone 1 gram, metronidazole 500 mg, NaCl one liter, Tylenol 650 mg, and Potassium chloride 40 meq x one in the ED. (19 Oct 2023 21:18)    Thoracic surgery consulted today for drainage of pleural effusion. Pt. seen and examined with Dr. Ugalde. Resting in bed. C/o pain to left back. No SOB. C/o difficulty with taking deep breaths. Pt having fevers at times still. OOB ad tere. Denies current HA, dizziness, Abd pain, n/v/d. Currently on IV antibiotics.         PAST MEDICAL & SURGICAL HISTORY:  SVT (supraventricular tachycardia)      Asthma      Migraine      Pyogenic granuloma      Schamberg's disease      Cervical spine degeneration      HNP (herniated nucleus pulposus), thoracic      History of lumpectomy of left breast      History of cholecystectomy      Venous stasis ulcer of right lower extremity      Breast cyst, left          REVIEW OF SYSTEMS      Negative except for what's noted above.     MEDICATIONS  (STANDING):  budesonide 160 MICROgram(s)/formoterol 4.5 MICROgram(s) Inhaler 2 Puff(s) Inhalation two times a day  cefepime   IVPB      cefepime   IVPB 2000 milliGRAM(s) IV Intermittent every 8 hours  cholecalciferol 5000 Unit(s) Oral daily  heparin   Injectable 5000 Unit(s) SubCutaneous every 12 hours  hydrochlorothiazide 12.5 milliGRAM(s) Oral daily  montelukast 10 milliGRAM(s) Oral daily  multivitamin 1 Tablet(s) Oral daily  sodium chloride 0.9%. 1000 milliLiter(s) (100 mL/Hr) IV Continuous <Continuous>    MEDICATIONS  (PRN):  acetaminophen     Tablet .. 650 milliGRAM(s) Oral every 6 hours PRN Temp greater or equal to 38C (100.4F), Mild Pain (1 - 3)  albuterol/ipratropium for Nebulization 3 milliLiter(s) Nebulizer every 6 hours PRN Shortness of Breath and/or Wheezing  bisacodyl 5 milliGRAM(s) Oral every 12 hours PRN Constipation  melatonin 3 milliGRAM(s) Oral at bedtime PRN Insomnia      Allergies    levofloxacin (Rash)  penicillin (Rash)  clarithromycin (Rash)  doxycycline (Rash)    Intolerances        SOCIAL HISTORY:    FAMILY HISTORY:  Family history of hypertension (Father)    Family history of diabetes mellitus (DM) (Father)    Family history of COPD (chronic obstructive pulmonary disease) (Father, Mother)    Family history of smoking (Father, Mother)    Family history of eosinophilia (Father)    Family history of osteopenia (Father)        Vital Signs Last 24 Hrs  T(C): 37.3 (21 Oct 2023 10:02), Max: 39 (21 Oct 2023 01:05)  T(F): 99.1 (21 Oct 2023 10:02), Max: 102.2 (21 Oct 2023 01:05)  HR: 82 (21 Oct 2023 10:02) (79 - 94)  BP: 129/64 (21 Oct 2023 10:02) (118/65 - 144/77)  BP(mean): --  RR: 17 (21 Oct 2023 10:02) (17 - 18)  SpO2: 97% (21 Oct 2023 10:02) (93% - 98%)    Parameters below as of 21 Oct 2023 10:02  Patient On (Oxygen Delivery Method): room air        PHYSICAL EXAM:  General: WD NAD  Neurology: A&Ox3, nonfocal, DOWELL x 4  Eyes: PERRLA/ EOMI, Gross vision intact  ENT/Neck: Neck supple, trachea midline, No JVD, Gross hearing intact  Respiratory: dec'd BS to left base  CV: RRR, S1S2, no murmurs, rubs or gallops  Abdominal: Soft, NT, ND +BS,   Extremities: No edema, + peripheral pulses  Skin: No Rashes, Hematoma, Ecchymosis            LABS:                        10.5   16.46 )-----------( 373      ( 21 Oct 2023 06:41 )             31.6     10-21    137  |  101  |  5<L>  ----------------------------<  99  3.6   |  23  |  0.45<L>    Ca    9.1      21 Oct 2023 06:41  Phos  2.7     10-21  Mg     2.10     10-21    TPro  6.7  /  Alb  2.8<L>  /  TBili  1.9<H>  /  DBili  x   /  AST  40<H>  /  ALT  36<H>  /  AlkPhos  149<H>  10-20    PT/INR - ( 20 Oct 2023 07:23 )   PT: 17.7 sec;   INR: 1.60 ratio         PTT - ( 20 Oct 2023 07:23 )  PTT:33.0 sec  Urinalysis Basic - ( 21 Oct 2023 06:41 )    Color: x / Appearance: x / SG: x / pH: x  Gluc: 99 mg/dL / Ketone: x  / Bili: x / Urobili: x   Blood: x / Protein: x / Nitrite: x   Leuk Esterase: x / RBC: x / WBC x   Sq Epi: x / Non Sq Epi: x / Bacteria: x        RADIOLOGY & ADDITIONAL STUDIES:    ACC: [id number] EXAM:  CT CHEST   ORDERED BY: RYDER CHAMORRO     PROCEDURE DATE:  10/19/2023          INTERPRETATION:  CLINICAL INFORMATION: Left pleural effusion    COMPARISON: Chest CT 1/14/2015, chest x-ray 10/19/2023    CONTRAST/COMPLICATIONS:  IV Contrast: None  Oral Contrast: None  Complications: None reported    PROCEDURE:  CT scan of the chest was obtained without intravenous contrast.    FINDINGS:    Trachea and mainstem bronchi patent. Lingular and left basilar   parenchymal opacification. Moderate size loculated left pleural effusion   and associated mediastinal pleural thickening (for example image 59,   series 2).    Right lower lobe superior segment 2.5 x 2 cm nodular consolidative   opacity (image 79, series 2).    Few mildly enlarged mediastinal nodes; reference precarinal 1.9 x 1.2 cm   node (image 57, series 2). Left internal mammary node measures 0.7 cm   (image 65, series 2).    Heart size normal. No pericardial effusion. Coronary calcifications.   Normal caliber thoracic aorta.    Cholecystectomy. Soft tissues and osseous structures unremarkable.      IMPRESSION:    Right lower lobe superior segment 2.5 cm nodular consolidative opacity;   differential diagnosis includes infection and primary lung malignancy.   Recommend CT chest follow-up in one month to help differentiate between   these two etiologies.    Moderate-sized loculated left pleural effusion with associated   mediastinal pleural thickening. The exact etiology is unclear, but   malignancy is a diagnostic consideration.    Lingular and left lower lobe parenchymal opacification may represent   atelectasis or pneumonia.    Mildly enlarged mediastinal nodes and left internal mammary node are   indeterminate and recommend attention on CT chest follow-up.

## 2023-10-21 NOTE — CONSULT NOTE ADULT - PROBLEM SELECTOR RECOMMENDATION 9
Bedside ultra sound done by our team this morning.   No sizable or safe pocket to place PTC or to drain at bedside.   Spoke with IR team who will re-evaluate pt for drainage/ left pigtail cath placement.   Possible drainage with IR tomorrow or Monday  Please prepare pt for procedure, above discussed w primary team  Recommend continue Antibiotics  Continue care per medical team  Further Thoracic intervention pending results of fluid analysis and status after drainage.   Will follow.

## 2023-10-21 NOTE — PROGRESS NOTE ADULT - SUBJECTIVE AND OBJECTIVE BOX
[facility] Division of Hospital Medicine  Rene FARR NetoDerik) MD Moe  Pager [phone]    SUBJECTIVE:  Chief complaint: Fever.    Pt seen and evaluated at bedside this AM. Febrile overnight. Ongoing left sided CP w/ cough. Pleuritic in nature. No SOB. No NV.      ROS: All systems negative except as noted.      Vital Signs Last 24 Hrs  T(C): 37.3 (21 Oct 2023 10:02), Max: 39 (21 Oct 2023 01:05)  T(F): 99.1 (21 Oct 2023 10:02), Max: 102.2 (21 Oct 2023 01:05)  HR: 82 (21 Oct 2023 10:02) (79 - 92)  BP: 129/64 (21 Oct 2023 10:02) (118/65 - 144/77)  BP(mean): --  RR: 17 (21 Oct 2023 10:02) (17 - 18)  SpO2: 97% (21 Oct 2023 10:02) (93% - 98%)    Parameters below as of 21 Oct 2023 10:02  Patient On (Oxygen Delivery Method): room air      PHYSICAL EXAM:  Gen- In bed, well-appearing, NAD. Speaks full sentences  Resp- CTAB, diminished at left base. no r/r/w.  CVS- RRR, S1S2, no g/r/m.  GI- Soft abd, NT, ND, +BSx4  Ext- No C/C.         MEDICATION:  MEDICATIONS  (STANDING):  budesonide 160 MICROgram(s)/formoterol 4.5 MICROgram(s) Inhaler 2 Puff(s) Inhalation two times a day  cefepime   IVPB      cefepime   IVPB 2000 milliGRAM(s) IV Intermittent every 8 hours  cholecalciferol 5000 Unit(s) Oral daily  heparin   Injectable 5000 Unit(s) SubCutaneous every 12 hours  hydrochlorothiazide 12.5 milliGRAM(s) Oral daily  montelukast 10 milliGRAM(s) Oral daily  multivitamin 1 Tablet(s) Oral daily  sodium chloride 0.9%. 1000 milliLiter(s) (100 mL/Hr) IV Continuous <Continuous>    MEDICATIONS  (PRN):  acetaminophen     Tablet .. 650 milliGRAM(s) Oral every 6 hours PRN Temp greater or equal to 38C (100.4F), Mild Pain (1 - 3)  albuterol/ipratropium for Nebulization 3 milliLiter(s) Nebulizer every 6 hours PRN Shortness of Breath and/or Wheezing  bisacodyl 5 milliGRAM(s) Oral every 12 hours PRN Constipation  melatonin 3 milliGRAM(s) Oral at bedtime PRN Insomnia        LABORATORY:                          10.5   16.46 )-----------( 373      ( 21 Oct 2023 06:41 )             31.6     10-21    137  |  101  |  5<L>  ----------------------------<  99  3.6   |  23  |  0.45<L>    Ca    9.1      21 Oct 2023 06:41  Phos  2.7     10-21  Mg     2.10     10-21    TPro  6.7  /  Alb  2.8<L>  /  TBili  1.9<H>  /  DBili  x   /  AST  40<H>  /  ALT  36<H>  /  AlkPhos  149<H>  10-20    PT/INR - ( 20 Oct 2023 07:23 )   PT: 17.7 sec;   INR: 1.60 ratio         PTT - ( 20 Oct 2023 07:23 )  PTT:33.0 sec  Urinalysis Basic - ( 21 Oct 2023 06:41 )    Color: x / Appearance: x / SG: x / pH: x  Gluc: 99 mg/dL / Ketone: x  / Bili: x / Urobili: x   Blood: x / Protein: x / Nitrite: x   Leuk Esterase: x / RBC: x / WBC x   Sq Epi: x / Non Sq Epi: x / Bacteria: x            SARS-CoV-2: NotDetec (19 Oct 2023 16:41)

## 2023-10-22 DIAGNOSIS — J86.9 PYOTHORAX WITHOUT FISTULA: ICD-10-CM

## 2023-10-22 LAB
ALBUMIN SERPL ELPH-MCNC: 2.8 G/DL — LOW (ref 3.3–5)
ALBUMIN SERPL ELPH-MCNC: 2.8 G/DL — LOW (ref 3.3–5)
ALP SERPL-CCNC: 127 U/L — HIGH (ref 40–120)
ALP SERPL-CCNC: 127 U/L — HIGH (ref 40–120)
ALT FLD-CCNC: 26 U/L — SIGNIFICANT CHANGE UP (ref 4–33)
ALT FLD-CCNC: 26 U/L — SIGNIFICANT CHANGE UP (ref 4–33)
ANION GAP SERPL CALC-SCNC: 12 MMOL/L — SIGNIFICANT CHANGE UP (ref 7–14)
ANION GAP SERPL CALC-SCNC: 12 MMOL/L — SIGNIFICANT CHANGE UP (ref 7–14)
APTT BLD: 34 SEC — SIGNIFICANT CHANGE UP (ref 24.5–35.6)
APTT BLD: 34 SEC — SIGNIFICANT CHANGE UP (ref 24.5–35.6)
AST SERPL-CCNC: 21 U/L — SIGNIFICANT CHANGE UP (ref 4–32)
AST SERPL-CCNC: 21 U/L — SIGNIFICANT CHANGE UP (ref 4–32)
BILIRUB DIRECT SERPL-MCNC: 0.4 MG/DL — HIGH (ref 0–0.3)
BILIRUB DIRECT SERPL-MCNC: 0.4 MG/DL — HIGH (ref 0–0.3)
BILIRUB INDIRECT FLD-MCNC: 0.4 MG/DL — SIGNIFICANT CHANGE UP (ref 0–1)
BILIRUB INDIRECT FLD-MCNC: 0.4 MG/DL — SIGNIFICANT CHANGE UP (ref 0–1)
BILIRUB SERPL-MCNC: 0.8 MG/DL — SIGNIFICANT CHANGE UP (ref 0.2–1.2)
BILIRUB SERPL-MCNC: 0.8 MG/DL — SIGNIFICANT CHANGE UP (ref 0.2–1.2)
BUN SERPL-MCNC: 6 MG/DL — LOW (ref 7–23)
BUN SERPL-MCNC: 6 MG/DL — LOW (ref 7–23)
CALCIUM SERPL-MCNC: 8.7 MG/DL — SIGNIFICANT CHANGE UP (ref 8.4–10.5)
CALCIUM SERPL-MCNC: 8.7 MG/DL — SIGNIFICANT CHANGE UP (ref 8.4–10.5)
CHLORIDE SERPL-SCNC: 99 MMOL/L — SIGNIFICANT CHANGE UP (ref 98–107)
CHLORIDE SERPL-SCNC: 99 MMOL/L — SIGNIFICANT CHANGE UP (ref 98–107)
CO2 SERPL-SCNC: 25 MMOL/L — SIGNIFICANT CHANGE UP (ref 22–31)
CO2 SERPL-SCNC: 25 MMOL/L — SIGNIFICANT CHANGE UP (ref 22–31)
CREAT SERPL-MCNC: 0.5 MG/DL — SIGNIFICANT CHANGE UP (ref 0.5–1.3)
CREAT SERPL-MCNC: 0.5 MG/DL — SIGNIFICANT CHANGE UP (ref 0.5–1.3)
EGFR: 113 ML/MIN/1.73M2 — SIGNIFICANT CHANGE UP
EGFR: 113 ML/MIN/1.73M2 — SIGNIFICANT CHANGE UP
GLUCOSE SERPL-MCNC: 101 MG/DL — HIGH (ref 70–99)
GLUCOSE SERPL-MCNC: 101 MG/DL — HIGH (ref 70–99)
HAV IGM SER-ACNC: SIGNIFICANT CHANGE UP
HAV IGM SER-ACNC: SIGNIFICANT CHANGE UP
HBV CORE IGM SER-ACNC: SIGNIFICANT CHANGE UP
HBV CORE IGM SER-ACNC: SIGNIFICANT CHANGE UP
HBV SURFACE AG SER-ACNC: SIGNIFICANT CHANGE UP
HBV SURFACE AG SER-ACNC: SIGNIFICANT CHANGE UP
HCT VFR BLD CALC: 32.7 % — LOW (ref 34.5–45)
HCT VFR BLD CALC: 32.7 % — LOW (ref 34.5–45)
HCV AB S/CO SERPL IA: 0.13 S/CO — SIGNIFICANT CHANGE UP (ref 0–0.99)
HCV AB S/CO SERPL IA: 0.13 S/CO — SIGNIFICANT CHANGE UP (ref 0–0.99)
HCV AB SERPL-IMP: SIGNIFICANT CHANGE UP
HCV AB SERPL-IMP: SIGNIFICANT CHANGE UP
HGB BLD-MCNC: 10.7 G/DL — LOW (ref 11.5–15.5)
HGB BLD-MCNC: 10.7 G/DL — LOW (ref 11.5–15.5)
INR BLD: 1.69 RATIO — HIGH (ref 0.85–1.18)
INR BLD: 1.69 RATIO — HIGH (ref 0.85–1.18)
LDH SERPL L TO P-CCNC: 177 U/L — SIGNIFICANT CHANGE UP (ref 135–225)
LDH SERPL L TO P-CCNC: 177 U/L — SIGNIFICANT CHANGE UP (ref 135–225)
MAGNESIUM SERPL-MCNC: 1.9 MG/DL — SIGNIFICANT CHANGE UP (ref 1.6–2.6)
MAGNESIUM SERPL-MCNC: 1.9 MG/DL — SIGNIFICANT CHANGE UP (ref 1.6–2.6)
MCHC RBC-ENTMCNC: 31.2 PG — SIGNIFICANT CHANGE UP (ref 27–34)
MCHC RBC-ENTMCNC: 31.2 PG — SIGNIFICANT CHANGE UP (ref 27–34)
MCHC RBC-ENTMCNC: 32.7 GM/DL — SIGNIFICANT CHANGE UP (ref 32–36)
MCHC RBC-ENTMCNC: 32.7 GM/DL — SIGNIFICANT CHANGE UP (ref 32–36)
MCV RBC AUTO: 95.3 FL — SIGNIFICANT CHANGE UP (ref 80–100)
MCV RBC AUTO: 95.3 FL — SIGNIFICANT CHANGE UP (ref 80–100)
NRBC # BLD: 0 /100 WBCS — SIGNIFICANT CHANGE UP (ref 0–0)
NRBC # BLD: 0 /100 WBCS — SIGNIFICANT CHANGE UP (ref 0–0)
NRBC # FLD: 0 K/UL — SIGNIFICANT CHANGE UP (ref 0–0)
NRBC # FLD: 0 K/UL — SIGNIFICANT CHANGE UP (ref 0–0)
PHOSPHATE SERPL-MCNC: 3 MG/DL — SIGNIFICANT CHANGE UP (ref 2.5–4.5)
PHOSPHATE SERPL-MCNC: 3 MG/DL — SIGNIFICANT CHANGE UP (ref 2.5–4.5)
PLATELET # BLD AUTO: 428 K/UL — HIGH (ref 150–400)
PLATELET # BLD AUTO: 428 K/UL — HIGH (ref 150–400)
POTASSIUM SERPL-MCNC: 4 MMOL/L — SIGNIFICANT CHANGE UP (ref 3.5–5.3)
POTASSIUM SERPL-MCNC: 4 MMOL/L — SIGNIFICANT CHANGE UP (ref 3.5–5.3)
POTASSIUM SERPL-SCNC: 4 MMOL/L — SIGNIFICANT CHANGE UP (ref 3.5–5.3)
POTASSIUM SERPL-SCNC: 4 MMOL/L — SIGNIFICANT CHANGE UP (ref 3.5–5.3)
PROCALCITONIN SERPL-MCNC: 0.12 NG/ML — HIGH (ref 0.02–0.1)
PROCALCITONIN SERPL-MCNC: 0.12 NG/ML — HIGH (ref 0.02–0.1)
PROT SERPL-MCNC: 7 G/DL — SIGNIFICANT CHANGE UP (ref 6–8.3)
PROT SERPL-MCNC: 7 G/DL — SIGNIFICANT CHANGE UP (ref 6–8.3)
PROTHROM AB SERPL-ACNC: 18.6 SEC — HIGH (ref 9.5–13)
PROTHROM AB SERPL-ACNC: 18.6 SEC — HIGH (ref 9.5–13)
RBC # BLD: 3.43 M/UL — LOW (ref 3.8–5.2)
RBC # BLD: 3.43 M/UL — LOW (ref 3.8–5.2)
RBC # FLD: 11.9 % — SIGNIFICANT CHANGE UP (ref 10.3–14.5)
RBC # FLD: 11.9 % — SIGNIFICANT CHANGE UP (ref 10.3–14.5)
SODIUM SERPL-SCNC: 136 MMOL/L — SIGNIFICANT CHANGE UP (ref 135–145)
SODIUM SERPL-SCNC: 136 MMOL/L — SIGNIFICANT CHANGE UP (ref 135–145)
WBC # BLD: 14.18 K/UL — HIGH (ref 3.8–10.5)
WBC # BLD: 14.18 K/UL — HIGH (ref 3.8–10.5)
WBC # FLD AUTO: 14.18 K/UL — HIGH (ref 3.8–10.5)
WBC # FLD AUTO: 14.18 K/UL — HIGH (ref 3.8–10.5)

## 2023-10-22 PROCEDURE — 99233 SBSQ HOSP IP/OBS HIGH 50: CPT

## 2023-10-22 PROCEDURE — 99254 IP/OBS CNSLTJ NEW/EST MOD 60: CPT | Mod: GC

## 2023-10-22 PROCEDURE — 99231 SBSQ HOSP IP/OBS SF/LOW 25: CPT

## 2023-10-22 RX ORDER — FLUTICASONE PROPIONATE AND SALMETEROL 50; 250 UG/1; UG/1
1 POWDER ORAL; RESPIRATORY (INHALATION)
Refills: 0 | Status: DISCONTINUED | OUTPATIENT
Start: 2023-10-22 | End: 2023-10-27

## 2023-10-22 RX ORDER — METRONIDAZOLE 500 MG
500 TABLET ORAL EVERY 8 HOURS
Refills: 0 | Status: DISCONTINUED | OUTPATIENT
Start: 2023-10-22 | End: 2023-10-27

## 2023-10-22 RX ORDER — CEFEPIME 1 G/1
2000 INJECTION, POWDER, FOR SOLUTION INTRAMUSCULAR; INTRAVENOUS EVERY 8 HOURS
Refills: 0 | Status: DISCONTINUED | OUTPATIENT
Start: 2023-10-23 | End: 2023-10-27

## 2023-10-22 RX ADMIN — CEFEPIME 100 MILLIGRAM(S): 1 INJECTION, POWDER, FOR SOLUTION INTRAMUSCULAR; INTRAVENOUS at 05:45

## 2023-10-22 RX ADMIN — Medication 100 MILLIGRAM(S): at 21:05

## 2023-10-22 RX ADMIN — HEPARIN SODIUM 5000 UNIT(S): 5000 INJECTION INTRAVENOUS; SUBCUTANEOUS at 17:19

## 2023-10-22 RX ADMIN — MUPIROCIN 1 APPLICATION(S): 20 OINTMENT TOPICAL at 17:22

## 2023-10-22 RX ADMIN — MUPIROCIN 1 APPLICATION(S): 20 OINTMENT TOPICAL at 05:47

## 2023-10-22 RX ADMIN — CEFEPIME 100 MILLIGRAM(S): 1 INJECTION, POWDER, FOR SOLUTION INTRAMUSCULAR; INTRAVENOUS at 21:06

## 2023-10-22 RX ADMIN — CEFEPIME 100 MILLIGRAM(S): 1 INJECTION, POWDER, FOR SOLUTION INTRAMUSCULAR; INTRAVENOUS at 12:13

## 2023-10-22 RX ADMIN — Medication 650 MILLIGRAM(S): at 05:54

## 2023-10-22 RX ADMIN — Medication 650 MILLIGRAM(S): at 17:19

## 2023-10-22 RX ADMIN — Medication 100 MILLIGRAM(S): at 15:24

## 2023-10-22 RX ADMIN — NEBIVOLOL HYDROCHLORIDE 5 MILLIGRAM(S): 5 TABLET ORAL at 05:47

## 2023-10-22 RX ADMIN — MONTELUKAST 10 MILLIGRAM(S): 4 TABLET, CHEWABLE ORAL at 12:14

## 2023-10-22 RX ADMIN — Medication 1 TABLET(S): at 12:14

## 2023-10-22 RX ADMIN — Medication 5000 UNIT(S): at 12:15

## 2023-10-22 RX ADMIN — HEPARIN SODIUM 5000 UNIT(S): 5000 INJECTION INTRAVENOUS; SUBCUTANEOUS at 05:47

## 2023-10-22 NOTE — PROVIDER CONTACT NOTE (OTHER) - SITUATION
Pt temp 102.2
Temp 102F. Pt c/o "I feel hot again
Pt refused 6pm metoprolol, states meds causes palpitations. Pt states she takes Bystolic 5mg daily in the morning
Pt refuse medication Metoprolol. Pt states medication gives them heart palpitation, they take Bystolic 5mg 1x in AM daily instead.
Pt states "I feel hot." Temp 100.9F. Pt denies SOB, denies chills. BP and SPO2 wnl.
Pt with oral temperature of 102.0
Received pt from ED. temp 100.5F orally. no complaints at this time. Pt did receive tylenol in the ED.

## 2023-10-22 NOTE — PROGRESS NOTE ADULT - SUBJECTIVE AND OBJECTIVE BOX
PULMONARY PROGRESS NOTE    AKILAH MARQUEZ  MRN-[id number]    Patient is a 53y old  Female who presents with a chief complaint of Sepsis due to undetermined organism, Left lower lobe pneumonia, Loculate pleural effusion (22 Oct 2023 12:52)      HPI:  -  -    ROS:   -    ACTIVE MEDICATION LIST:  MEDICATIONS  (STANDING):  budesonide  80 MICROgram(s)/formoterol 4.5 MICROgram(s) Inhaler 2 Puff(s) Inhalation two times a day  budesonide 160 MICROgram(s)/formoterol 4.5 MICROgram(s) Inhaler 2 Puff(s) Inhalation two times a day  cefepime   IVPB      cefepime   IVPB 2000 milliGRAM(s) IV Intermittent every 8 hours  cholecalciferol 5000 Unit(s) Oral daily  heparin   Injectable 5000 Unit(s) SubCutaneous every 12 hours  hydrochlorothiazide 12.5 milliGRAM(s) Oral daily  metroNIDAZOLE  IVPB 500 milliGRAM(s) IV Intermittent every 8 hours  montelukast 10 milliGRAM(s) Oral daily  multivitamin 1 Tablet(s) Oral daily  mupirocin 2% Ointment 1 Application(s) Topical two times a day  nebivolol 5 milliGRAM(s) Oral daily  sodium chloride 0.9%. 1000 milliLiter(s) (100 mL/Hr) IV Continuous <Continuous>    MEDICATIONS  (PRN):  acetaminophen     Tablet .. 650 milliGRAM(s) Oral every 6 hours PRN Temp greater or equal to 38C (100.4F), Mild Pain (1 - 3)  albuterol/ipratropium for Nebulization 3 milliLiter(s) Nebulizer every 6 hours PRN Shortness of Breath and/or Wheezing  bisacodyl 5 milliGRAM(s) Oral every 12 hours PRN Constipation  melatonin 3 milliGRAM(s) Oral at bedtime PRN Insomnia      EXAM:  Vital Signs Last 24 Hrs  T(C): 37.4 (22 Oct 2023 12:30), Max: 38.3 (22 Oct 2023 04:48)  T(F): 99.3 (22 Oct 2023 12:30), Max: 101 (22 Oct 2023 04:48)  HR: 79 (22 Oct 2023 12:30) (79 - 89)  BP: 113/70 (22 Oct 2023 12:30) (113/70 - 141/60)  BP(mean): --  RR: 16 (22 Oct 2023 12:30) (16 - 18)  SpO2: 97% (22 Oct 2023 12:30) (96% - 98%)    Parameters below as of 22 Oct 2023 12:30  Patient On (Oxygen Delivery Method): room air        GENERAL: The patient is awake and alert in no apparent distress.     LUNGS: Clear to auscultation without wheezing, rales or rhonchi; respirations unlabored    HEART: Regular rate and rhythm without murmur.                            10.7   14.18 )-----------( 428      ( 22 Oct 2023 07:06 )             32.7       10-22    136  |  99  |  6<L>  ----------------------------<  101<H>  4.0   |  25  |  0.50    Ca    8.7      22 Oct 2023 07:06  Phos  3.0     10-22  Mg     1.90     10-22    TPro  7.0  /  Alb  2.8<L>  /  TBili  0.8  /  DBili  0.4<H>  /  AST  21  /  ALT  26  /  AlkPhos  127<H>  10-22    >>> <<<    PROBLEM LIST:  53y Female with HEALTH ISSUES - PROBLEM Dx:  LLL pneumonia    Hypoalbuminemia    Sepsis due to undetermined organism    Prophylactic measure    ECG abnormality    Dehydration with hyponatremia    Loculated pleural effusion    Elevated bilirubin    SVT (supraventricular tachycardia)              RECS:        Please call with any questions.    Elizabeth Rees DO  [facility] Pulmonary/Sleep Medicine  [phone]   PULMONARY PROGRESS NOTE    AKILAH MARQUEZ  MRN-[id number]    Patient is a 53y old  Female who presents with a chief complaint of Sepsis due to undetermined organism, Left lower lobe pneumonia, Loculate pleural effusion (22 Oct 2023 12:52)      HPI:  -  -eating lunch  onroom air  using IS  still with chest wall pain     ROS:   -    ACTIVE MEDICATION LIST:  MEDICATIONS  (STANDING):  budesonide  80 MICROgram(s)/formoterol 4.5 MICROgram(s) Inhaler 2 Puff(s) Inhalation two times a day  budesonide 160 MICROgram(s)/formoterol 4.5 MICROgram(s) Inhaler 2 Puff(s) Inhalation two times a day  cefepime   IVPB      cefepime   IVPB 2000 milliGRAM(s) IV Intermittent every 8 hours  cholecalciferol 5000 Unit(s) Oral daily  heparin   Injectable 5000 Unit(s) SubCutaneous every 12 hours  hydrochlorothiazide 12.5 milliGRAM(s) Oral daily  metroNIDAZOLE  IVPB 500 milliGRAM(s) IV Intermittent every 8 hours  montelukast 10 milliGRAM(s) Oral daily  multivitamin 1 Tablet(s) Oral daily  mupirocin 2% Ointment 1 Application(s) Topical two times a day  nebivolol 5 milliGRAM(s) Oral daily  sodium chloride 0.9%. 1000 milliLiter(s) (100 mL/Hr) IV Continuous <Continuous>    MEDICATIONS  (PRN):  acetaminophen     Tablet .. 650 milliGRAM(s) Oral every 6 hours PRN Temp greater or equal to 38C (100.4F), Mild Pain (1 - 3)  albuterol/ipratropium for Nebulization 3 milliLiter(s) Nebulizer every 6 hours PRN Shortness of Breath and/or Wheezing  bisacodyl 5 milliGRAM(s) Oral every 12 hours PRN Constipation  melatonin 3 milliGRAM(s) Oral at bedtime PRN Insomnia      EXAM:  Vital Signs Last 24 Hrs  T(C): 37.4 (22 Oct 2023 12:30), Max: 38.3 (22 Oct 2023 04:48)  T(F): 99.3 (22 Oct 2023 12:30), Max: 101 (22 Oct 2023 04:48)  HR: 79 (22 Oct 2023 12:30) (79 - 89)  BP: 113/70 (22 Oct 2023 12:30) (113/70 - 141/60)  BP(mean): --  RR: 16 (22 Oct 2023 12:30) (16 - 18)  SpO2: 97% (22 Oct 2023 12:30) (96% - 98%)    Parameters below as of 22 Oct 2023 12:30  Patient On (Oxygen Delivery Method): room air        GENERAL: The patient is awake and alert in no apparent distress.     LUNGS:   respirations unlabored                             10.7   14.18 )-----------( 428      ( 22 Oct 2023 07:06 )             32.7       10-22    136  |  99  |  6<L>  ----------------------------<  101<H>  4.0   |  25  |  0.50    Ca    8.7      22 Oct 2023 07:06  Phos  3.0     10-22  Mg     1.90     10-22    TPro  7.0  /  Alb  2.8<L>  /  TBili  0.8  /  DBili  0.4<H>  /  AST  21  /  ALT  26  /  AlkPhos  127<H>  10-22    < from: CT Chest No Cont (10.19.23 @ 17:53) >    ACC: [id number] EXAM:  CT CHEST   ORDERED BY: RYDER CHAMORRO     PROCEDURE DATE:  10/19/2023          INTERPRETATION:  CLINICAL INFORMATION: Left pleural effusion    COMPARISON: Chest CT 1/14/2015, chest x-ray 10/19/2023    CONTRAST/COMPLICATIONS:  IV Contrast: None  Oral Contrast: None  Complications: None reported    PROCEDURE:  CT scan of the chest was obtained without intravenous contrast.    FINDINGS:    Trachea and mainstem bronchi patent. Lingular and left basilar   parenchymal opacification. Moderate size loculated left pleural effusion   and associated mediastinal pleural thickening (for example image 59,   series 2).    Right lower lobe superior segment 2.5 x 2 cm nodular consolidative   opacity (image 79, series 2).    Few mildly enlarged mediastinal nodes; reference precarinal 1.9 x 1.2 cm   node (image 57, series 2). Left internal mammary node measures 0.7 cm   (image 65, series 2).    Heart size normal. No pericardial effusion. Coronary calcifications.   Normal caliber thoracic aorta.    Cholecystectomy. Soft tissues and osseous structures unremarkable.      IMPRESSION:    Right lower lobe superior segment 2.5 cm nodular consolidative opacity;   differential diagnosis includes infection and primary lung malignancy.   Recommend CT chest follow-up in one month to help differentiate between   these two etiologies.    Moderate-sized loculated left pleural effusion with associated   mediastinal pleural thickening. The exact etiology is unclear, but   malignancy is a diagnostic consideration.    Lingular and left lower lobe parenchymal opacification may represent   atelectasis or pneumonia.    Mildly enlarged mediastinal nodes and left internal mammary node are   indeterminate and recommend attention on CT chest follow-up.    --- End of Report ---          JIMI WEAVER MD; Resident Radiologist  This document has been electronically signed.  JOSELYN SALTER MD; Attending Radiologist  This document has been electronically signed. Oct 19 2023  6:19PM    < end of copied text >      PROBLEM LIST:  53y Female with HEALTH ISSUES - PROBLEM Dx:  LLL pneumonia    Hypoalbuminemia    Sepsis due to undetermined organism    Prophylactic measure    ECG abnormality    Dehydration with hyponatremia    Loculated pleural effusion    Elevated bilirubin    SVT (supraventricular tachycardia)          RECS:  appreciate CTS/ID input  pending IR tomorrow  broad spectrum abx  use is  will need to send out fluid studies including cytopath,culture,fungal,cellcount  she is  not using symbicort (states it makes her cough)  she said she is bringing in her advair- asked to show rx to RN to verify  kori prn    Please call with any questions.    Elizabeth Rees, DO  [facility] Pulmonary/Sleep Medicine  [phone]

## 2023-10-22 NOTE — PROVIDER CONTACT NOTE (OTHER) - NAME OF MD/NP/PA/DO NOTIFIED:
Paul Avina ACP
Jannette Barkley ( [facility])
Codey Camacho
BOB Nguyen
Codey Camacho
Jannette Barkley
Jannette Barkley

## 2023-10-22 NOTE — CONSULT NOTE ADULT - REASON FOR ADMISSION
Sepsis due to undetermined organism, Left lower lobe pneumonia, Loculate pleural effusion

## 2023-10-22 NOTE — CONSULT NOTE ADULT - SUBJECTIVE AND OBJECTIVE BOX
Patient is a 53y old  Female who presents with a chief complaint of Sepsis, Left lower lobe pneumonia, Loculate pleural effusion (22 Oct 2023 08:33)    HPI:  53 year old female, with past history significant for Asthma, SVT, HNP, Migraine presented to the ED for fever. Patient reports sudden onset of left sided chest pain one week ago and saw her PCP who suspected the cause to be costochondritis. She used meloxicam, flexeril and Ibuprofen, pain and discomfort did not improve. She developed fever (max of 102.5 F), chills, and coughing. CXR was subsequently done, and patient was sent to the ED for further evaluation.    Vital signs upon ED presentation as follows: BP = 125/76, HR = 99, RR = 20, T = 38.1 to 39.1 C (100.5 to 102.4 F), O2 Sat = 95% on RA.  WBC 18. Diagnosed with Pleural Effusion and started on ceftriaxone and Metronidazole.     Thoracocentesis/ drainage is being planned but not done.    Remains febrile on antibiotics; which prompted ID consult.    RVP negative  Blood cultures 10/19 NGTD  Legionella urine Negative  MRSa negative      prior hospital charts reviewed [  ]  primary team notes reviewed [ x ]  other consultant notes reviewed [x  ]    PAST MEDICAL & SURGICAL HISTORY:  SVT (supraventricular tachycardia)      Asthma      Migraine      Pyogenic granuloma      Schamberg's disease      Cervical spine degeneration      HNP (herniated nucleus pulposus), thoracic      History of lumpectomy of left breast      History of cholecystectomy      Venous stasis ulcer of right lower extremity      Breast cyst, left          Allergies  levofloxacin (Rash)  penicillin (Rash)  clarithromycin (Rash)  doxycycline (Rash)    ANTIMICROBIALS (past 90 days)  MEDICATIONS  (STANDING):    cefepime   IVPB   100 mL/Hr IV Intermittent (10-21-23 @ 11:02)    cefepime   IVPB   100 mL/Hr IV Intermittent (10-22-23 @ 05:45)   100 mL/Hr IV Intermittent (10-21-23 @ 21:27)    cefTRIAXone   IVPB   100 mL/Hr IV Intermittent (10-19-23 @ 16:32)    cefTRIAXone   IVPB   100 mL/Hr IV Intermittent (10-20-23 @ 15:41)    metroNIDAZOLE  IVPB   100 mL/Hr IV Intermittent (10-21-23 @ 00:56)   100 mL/Hr IV Intermittent (10-20-23 @ 16:41)   100 mL/Hr IV Intermittent (10-20-23 @ 08:35)   100 mL/Hr IV Intermittent (10-20-23 @ 00:59)    metroNIDAZOLE  IVPB   100 mL/Hr IV Intermittent (10-19-23 @ 17:25)        cefepime   IVPB 2000 every 8 hours  cefepime   IVPB      MEDICATIONS  (STANDING):  acetaminophen     Tablet .. 650 every 6 hours PRN  albuterol/ipratropium for Nebulization 3 every 6 hours PRN  bisacodyl 5 every 12 hours PRN  budesonide  80 MICROgram(s)/formoterol 4.5 MICROgram(s) Inhaler 2 two times a day  budesonide 160 MICROgram(s)/formoterol 4.5 MICROgram(s) Inhaler 2 two times a day  heparin   Injectable 5000 every 12 hours  hydrochlorothiazide 12.5 daily  melatonin 3 at bedtime PRN  montelukast 10 daily  nebivolol 5 daily    SOCIAL HISTORY:       FAMILY HISTORY:  Family history of hypertension (Father)    Family history of diabetes mellitus (DM) (Father)    Family history of COPD (chronic obstructive pulmonary disease) (Father, Mother)    Family history of smoking (Father, Mother)    Family history of eosinophilia (Father)    Family history of osteopenia (Father)      REVIEW OF SYSTEMS  [  ] ROS unobtainable because:    [  ] All other systems negative except as noted below:	    Constitutional:  [ ] fever [ ] chills  [ ] weight loss  [ ] weakness  Skin:  [ ] rash [ ] phlebitis	  Eyes: [ ] icterus [ ] pain  [ ] discharge	  ENMT: [ ] sore throat  [ ] thrush [ ] ulcers [ ] exudates  Respiratory: [ ] dyspnea [ ] hemoptysis [ ] cough [ ] sputum	  Cardiovascular:  [ ] chest pain [ ] palpitations [ ] edema	  Gastrointestinal:  [ ] nausea [ ] vomiting [ ] diarrhea [ ] constipation [ ] pain	  Genitourinary:  [ ] dysuria [ ] frequency [ ] hematuria [ ] discharge [ ] flank pain  [ ] incontinence  Musculoskeletal:  [ ] myalgias [ ] arthralgias [ ] arthritis  [ ] back pain  Neurological:  [ ] headache [ ] seizures  [ ] confusion/altered mental status  Psychiatric:  [ ] anxiety [ ] depression	  Hematology/Lymphatics:  [ ] lymphadenopathy  Endocrine:  [ ] adrenal [ ] thyroid  Allergic/Immunologic:	 [ ] transplant [ ] seasonal    Vital Signs Last 24 Hrs  T(F): 98.1 (10-22-23 @ 06:00), Max: 102.4 (10-19-23 @ 15:56)  Vital Signs Last 24 Hrs  HR: 89 (10-22-23 @ 04:48) (82 - 89)  BP: 133/78 (10-22-23 @ 04:48) (129/64 - 141/60)  RR: 18 (10-22-23 @ 04:48)  SpO2: 96% (10-22-23 @ 04:48) (96% - 98%)  Wt(kg): --    PHYSICAL EXAM:  Constitutional: non-toxic, no distress  HEAD/EYES: anicteric, no conjunctival injection  ENT:  supple, no thrush  Cardiovascular:   normal S1, S2, no murmur, no edema  Respiratory:  clear BS bilaterally, no wheezes, no rales  GI:  soft, non-tender, normal bowel sounds  :  no peters, no CVA tenderness  Musculoskeletal:  no synovitis, normal ROM  Neurologic: awake and alert, normal strength, no focal findings  Skin:  no rash, no erythema, no phlebitis  Heme/Onc: no lymphadenopathy   Psychiatric:  awake, alert, appropriate mood                            10.7   14.18 )-----------( 428      ( 22 Oct 2023 07:06 )             32.7   10-22    136  |  99  |  6<L>  ----------------------------<  101<H>  4.0   |  25  |  0.50    Ca    8.7      22 Oct 2023 07:06  Phos  3.0     10-22  Mg     1.90     10-22    TPro  7.0  /  Alb  2.8<L>  /  TBili  0.8  /  DBili  0.4<H>  /  AST  21  /  ALT  26  /  AlkPhos  127<H>  10-22    Urinalysis Basic - ( 22 Oct 2023 07:06 )    Color: x / Appearance: x / SG: x / pH: x  Gluc: 101 mg/dL / Ketone: x  / Bili: x / Urobili: x   Blood: x / Protein: x / Nitrite: x   Leuk Esterase: x / RBC: x / WBC x   Sq Epi: x / Non Sq Epi: x / Bacteria: x    MICROBIOLOGY:  Culture - Blood (collected 19 Oct 2023 20:55)  Source: .Blood Blood-Peripheral  Preliminary Report (22 Oct 2023 01:01):    No growth at 48 Hours    Culture - Blood (collected 19 Oct 2023 15:50)  Source: .Blood Blood-Peripheral  Preliminary Report (22 Oct 2023 01:01):    No growth at 48 Hours              Rapid RVP Result: NotDetec (10-19 @ 16:41)      RADIOLOGY:  imaging below personally reviewed and agree with findings Patient is a 53y old  Female who presents with a chief complaint of Sepsis, Left lower lobe pneumonia, Loculate pleural effusion (22 Oct 2023 08:33)    HPI:  53 year old female, with past history significant for Asthma, SVT, HNP, Migraine presented to the ED for fever. Patient reports sudden onset of left sided chest pain one week ago and saw her PCP on 10/12 who suspected the cause to be costochondritis. She used meloxicam, flexeril and Ibuprofen, pain and discomfort did not improve. She developed fever (max of 102.5 F), chills, and coughing and went back to see her on 10/19  CXR was subsequently done, and patient was advised to go to the ED for further evaluation.    Vital signs upon ED presentation as follows: BP = 125/76, HR = 99, RR = 20, T = 38.1 to 39.1 C (100.5 to 102.4 F), O2 Sat = 95% on RA.  WBC 18. Diagnosed with Pleural Effusion and started on ceftriaxone and Metronidazole.     Thoracocentesis/ drainage is being planned but not done.    Remains febrile on antibiotics; abx switched to cefepime on 10/21, Metronidazole was stopped. Still with fevers which prompted ID consult.    RVP negative  Blood cultures 10/19 NGTD  Legionella urine Negative  MRSA negative      prior hospital charts reviewed [  ]  primary team notes reviewed [ x ]  other consultant notes reviewed [x  ]    PAST MEDICAL & SURGICAL HISTORY:  SVT (supraventricular tachycardia)      Asthma      Migraine      Pyogenic granuloma      Schamberg's disease      Cervical spine degeneration      HNP (herniated nucleus pulposus), thoracic      History of lumpectomy of left breast      History of cholecystectomy      Venous stasis ulcer of right lower extremity      Breast cyst, left          Allergies  levofloxacin (Rash)  penicillin (Rash); has tolerated amoxicillin in recent times  clarithromycin (Rash)  doxycycline (Rash)    ANTIMICROBIALS (past 90 days)  MEDICATIONS  (STANDING):    cefepime   IVPB   100 mL/Hr IV Intermittent (10-21-23 @ 11:02)    cefepime   IVPB   100 mL/Hr IV Intermittent (10-22-23 @ 05:45)   100 mL/Hr IV Intermittent (10-21-23 @ 21:27)    cefTRIAXone   IVPB   100 mL/Hr IV Intermittent (10-19-23 @ 16:32)    cefTRIAXone   IVPB   100 mL/Hr IV Intermittent (10-20-23 @ 15:41)    metroNIDAZOLE  IVPB   100 mL/Hr IV Intermittent (10-21-23 @ 00:56)   100 mL/Hr IV Intermittent (10-20-23 @ 16:41)   100 mL/Hr IV Intermittent (10-20-23 @ 08:35)   100 mL/Hr IV Intermittent (10-20-23 @ 00:59)    metroNIDAZOLE  IVPB   100 mL/Hr IV Intermittent (10-19-23 @ 17:25)        cefepime   IVPB 2000 every 8 hours  cefepime   IVPB      MEDICATIONS  (STANDING):  acetaminophen     Tablet .. 650 every 6 hours PRN  albuterol/ipratropium for Nebulization 3 every 6 hours PRN  bisacodyl 5 every 12 hours PRN  budesonide  80 MICROgram(s)/formoterol 4.5 MICROgram(s) Inhaler 2 two times a day  budesonide 160 MICROgram(s)/formoterol 4.5 MICROgram(s) Inhaler 2 two times a day  heparin   Injectable 5000 every 12 hours  hydrochlorothiazide 12.5 daily  melatonin 3 at bedtime PRN  montelukast 10 daily  nebivolol 5 daily    SOCIAL HISTORY:   Pesonal , not sexually active, social alcohol use, non smoker    FAMILY HISTORY:  Family history of hypertension (Father)    Family history of diabetes mellitus (DM) (Father)    Family history of COPD (chronic obstructive pulmonary disease) (Father, Mother)    Family history of smoking (Father, Mother)    Family history of eosinophilia (Father)    Family history of osteopenia (Father)      REVIEW OF SYSTEMS  [  ] ROS unobtainable because:    [ x ] All other systems negative except as noted below:	    Constitutional:  [ ] fever [ ] chills  [ ] weight loss  [ ] weakness  Skin:  [ ] rash [ ] phlebitis	  Eyes: [ ] icterus [ ] pain  [ ] discharge	  ENMT: [ ] sore throat  [ ] thrush [ ] ulcers [ ] exudates  Respiratory: [ ] dyspnea [ ] hemoptysis [x ] cough [ ] sputum	  Cardiovascular:  [x ] chest pain [ ] palpitations [ ] edema	  Gastrointestinal:  [ ] nausea [ ] vomiting [ ] diarrhea [ ] constipation [ ] pain	  Genitourinary:  [ ] dysuria [ ] frequency [ ] hematuria [ ] discharge [ ] flank pain  [ ] incontinence  Musculoskeletal:  [ ] myalgias [ ] arthralgias [ ] arthritis  [ ] back pain  Neurological:  [ ] headache [ ] seizures  [ ] confusion/altered mental status  Psychiatric:  [ ] anxiety [ ] depression	  Hematology/Lymphatics:  [ ] lymphadenopathy  Endocrine:  [ ] adrenal [ ] thyroid  Allergic/Immunologic:	 [ ] transplant [ ] seasonal    Vital Signs Last 24 Hrs  T(F): 98.1 (10-22-23 @ 06:00), Max: 102.4 (10-19-23 @ 15:56)  Vital Signs Last 24 Hrs  HR: 89 (10-22-23 @ 04:48) (82 - 89)  BP: 133/78 (10-22-23 @ 04:48) (129/64 - 141/60)  RR: 18 (10-22-23 @ 04:48)  SpO2: 96% (10-22-23 @ 04:48) (96% - 98%)  Wt(kg): --    PHYSICAL EXAM:  Constitutional: non-toxic, no distress  HEAD/EYES: anicteric, no conjunctival injection  ENT:  supple, no thrush, poor dentition  Cardiovascular:   normal S1, S2, no murmur, no edema  Respiratory:  decreased BS  GI:  soft, non-tender, normal bowel sounds  :  no peters, no CVA tenderness  Musculoskeletal:  no synovitis, normal ROM  Neurologic: awake and alert, normal strength, no focal findings  Skin:  no rash, no erythema, no phlebitis  Heme/Onc: no lymphadenopathy   Psychiatric:  awake, alert, appropriate mood                            10.7   14.18 )-----------( 428      ( 22 Oct 2023 07:06 )             32.7   10-22    136  |  99  |  6<L>  ----------------------------<  101<H>  4.0   |  25  |  0.50    Ca    8.7      22 Oct 2023 07:06  Phos  3.0     10-22  Mg     1.90     10-22    TPro  7.0  /  Alb  2.8<L>  /  TBili  0.8  /  DBili  0.4<H>  /  AST  21  /  ALT  26  /  AlkPhos  127<H>  10-22    Urinalysis Basic - ( 22 Oct 2023 07:06 )    Color: x / Appearance: x / SG: x / pH: x  Gluc: 101 mg/dL / Ketone: x  / Bili: x / Urobili: x   Blood: x / Protein: x / Nitrite: x   Leuk Esterase: x / RBC: x / WBC x   Sq Epi: x / Non Sq Epi: x / Bacteria: x    MICROBIOLOGY:  Culture - Blood (collected 19 Oct 2023 20:55)  Source: .Blood Blood-Peripheral  Preliminary Report (22 Oct 2023 01:01):    No growth at 48 Hours    Culture - Blood (collected 19 Oct 2023 15:50)  Source: .Blood Blood-Peripheral  Preliminary Report (22 Oct 2023 01:01):    No growth at 48 Hours              Rapid RVP Result: NotDetec (10-19 @ 16:41)      RADIOLOGY:  imaging below personally reviewed and agree with findings    < from: CT Chest No Cont (10.19.23 @ 17:53) >  ACC: [id number] EXAM:  CT CHEST   ORDERED BY: RYDER CHAMORRO     PROCEDURE DATE:  10/19/2023          INTERPRETATION:  CLINICAL INFORMATION: Left pleural effusion    COMPARISON: Chest CT 1/14/2015, chest x-ray 10/19/2023    CONTRAST/COMPLICATIONS:  IV Contrast: None  Oral Contrast: None  Complications: None reported    PROCEDURE:  CT scan of the chest was obtained without intravenous contrast.    FINDINGS:    Trachea and mainstem bronchi patent. Lingular and left basilar   parenchymal opacification. Moderate size loculated left pleural effusion   and associated mediastinal pleural thickening (for example image 59,   series 2).    Right lower lobe superior segment 2.5 x 2 cm nodular consolidative   opacity (image 79, series 2).    Few mildly enlarged mediastinal nodes; reference precarinal 1.9 x 1.2 cm   node (image 57, series 2). Left internal mammary node measures 0.7 cm   (image 65, series 2).    Heart size normal. No pericardial effusion. Coronary calcifications.   Normal caliber thoracic aorta.    Cholecystectomy. Soft tissues and osseous structures unremarkable.      IMPRESSION:    Right lower lobe superior segment 2.5 cm nodular consolidative opacity;   differential diagnosis includes infection and primary lung malignancy.   Recommend CT chest follow-up in one month to help differentiate between   these two etiologies.    Moderate-sized loculated left pleural effusion with associated   mediastinal pleural thickening. The exact etiology is unclear, but   malignancy is a diagnostic consideration.    Lingular and left lower lobe parenchymal opacification may represent   atelectasis or pneumonia.    Mildly enlarged mediastinal nodes and left internal mammary node are   indeterminate and recommend attention on CT chest follow-up.      < end of copied text >  < from: US Abdomen Upper Quadrant Right (10.21.23 @ 14:27) >    ACC: [id number] EXAM:  US ABDOMEN RT UPR QUADRANT   ORDERED BY: DESI PAGE     PROCEDURE DATE:  10/21/2023          INTERPRETATION:  CLINICAL INFORMATION: Abnormal LFTs    COMPARISON: None available.    TECHNIQUE: Sonography of the right upper quadrant.    FINDINGS:  Liver: Within normal limits.  Bile ducts: Normal caliber. Common bile duct measures 3 mm.  Gallbladder: Cholecystectomy.  Pancreas: Visualized portions are within normal limits.  Right kidney: 12.3 cm. No hydronephrosis.  Ascites: None.  IVC: Visualized portions are within normal limits.    IMPRESSION:  Normal right upper quadrant abdominal ultrasound.    < end of copied text >   Patient is a 53y old  Female who presents with a chief complaint of Sepsis, Left lower lobe pneumonia, Loculate pleural effusion (22 Oct 2023 08:33)    HPI:  53 year old female, with past history significant for Asthma, SVT, HNP, Migraine presented to the ED for fever. Patient reports sudden onset of left sided chest pain one week ago and saw her PCP on 10/12 who suspected the cause to be costochondritis. She used meloxicam, flexeril and Ibuprofen, pain and discomfort did not improve. She developed fever (max of 102.5 F), chills, and coughing and went back to see her on 10/19  CXR was subsequently done, and patient was advised to go to the ED for further evaluation.    Vital signs upon ED presentation as follows: BP = 125/76, HR = 99, RR = 20, T = 38.1 to 39.1 C (100.5 to 102.4 F), O2 Sat = 95% on RA.  WBC 18. Diagnosed with Pleural Effusion and started on ceftriaxone and Metronidazole.     Thoracocentesis/ drainage is being planned but not done.    Remains febrile on antibiotics; abx switched to cefepime on 10/21, Metronidazole was stopped. Still with fevers which prompted ID consult.    RVP negative  Blood cultures 10/19 NGTD  Legionella urine Negative  MRSA negative      prior hospital charts reviewed [  ]  primary team notes reviewed [ x ]  other consultant notes reviewed [x  ]    PAST MEDICAL & SURGICAL HISTORY:  SVT (supraventricular tachycardia)      Asthma      Migraine      Pyogenic granuloma      Schamberg's disease      Cervical spine degeneration      HNP (herniated nucleus pulposus), thoracic      History of lumpectomy of left breast      History of cholecystectomy      Venous stasis ulcer of right lower extremity      Breast cyst, left          Allergies  levofloxacin (Rash)  penicillin (Rash); has tolerated amoxicillin in recent times  clarithromycin (Rash)  doxycycline (Rash)    ANTIMICROBIALS (past 90 days)  MEDICATIONS  (STANDING):    cefepime   IVPB   100 mL/Hr IV Intermittent (10-21-23 @ 11:02)    cefepime   IVPB   100 mL/Hr IV Intermittent (10-22-23 @ 05:45)   100 mL/Hr IV Intermittent (10-21-23 @ 21:27)    cefTRIAXone   IVPB   100 mL/Hr IV Intermittent (10-19-23 @ 16:32)    cefTRIAXone   IVPB   100 mL/Hr IV Intermittent (10-20-23 @ 15:41)    metroNIDAZOLE  IVPB   100 mL/Hr IV Intermittent (10-21-23 @ 00:56)   100 mL/Hr IV Intermittent (10-20-23 @ 16:41)   100 mL/Hr IV Intermittent (10-20-23 @ 08:35)   100 mL/Hr IV Intermittent (10-20-23 @ 00:59)    metroNIDAZOLE  IVPB   100 mL/Hr IV Intermittent (10-19-23 @ 17:25)        cefepime   IVPB 2000 every 8 hours  cefepime   IVPB      MEDICATIONS  (STANDING):  acetaminophen     Tablet .. 650 every 6 hours PRN  albuterol/ipratropium for Nebulization 3 every 6 hours PRN  bisacodyl 5 every 12 hours PRN  budesonide  80 MICROgram(s)/formoterol 4.5 MICROgram(s) Inhaler 2 two times a day  budesonide 160 MICROgram(s)/formoterol 4.5 MICROgram(s) Inhaler 2 two times a day  heparin   Injectable 5000 every 12 hours  hydrochlorothiazide 12.5 daily  melatonin 3 at bedtime PRN  montelukast 10 daily  nebivolol 5 daily    SOCIAL HISTORY:   , not sexually active, social alcohol use, non smoker    FAMILY HISTORY:  Family history of hypertension (Father)    Family history of diabetes mellitus (DM) (Father)    Family history of COPD (chronic obstructive pulmonary disease) (Father, Mother)    Family history of smoking (Father, Mother)    Family history of eosinophilia (Father)    Family history of osteopenia (Father)      REVIEW OF SYSTEMS  [  ] ROS unobtainable because:    [ x ] All other systems negative except as noted below:	    Constitutional:  [ ] fever [ ] chills  [ ] weight loss  [ ] weakness  Skin:  [ ] rash [ ] phlebitis	  Eyes: [ ] icterus [ ] pain  [ ] discharge	  ENMT: [ ] sore throat  [ ] thrush [ ] ulcers [ ] exudates  Respiratory: [ ] dyspnea [ ] hemoptysis [x ] cough [ ] sputum	  Cardiovascular:  [x ] chest pain [ ] palpitations [ ] edema	  Gastrointestinal:  [ ] nausea [ ] vomiting [ ] diarrhea [ ] constipation [ ] pain	  Genitourinary:  [ ] dysuria [ ] frequency [ ] hematuria [ ] discharge [ ] flank pain  [ ] incontinence  Musculoskeletal:  [ ] myalgias [ ] arthralgias [ ] arthritis  [ ] back pain  Neurological:  [ ] headache [ ] seizures  [ ] confusion/altered mental status  Psychiatric:  [ ] anxiety [ ] depression	  Hematology/Lymphatics:  [ ] lymphadenopathy  Endocrine:  [ ] adrenal [ ] thyroid  Allergic/Immunologic:	 [ ] transplant [ ] seasonal    Vital Signs Last 24 Hrs  T(F): 98.1 (10-22-23 @ 06:00), Max: 102.4 (10-19-23 @ 15:56)  Vital Signs Last 24 Hrs  HR: 89 (10-22-23 @ 04:48) (82 - 89)  BP: 133/78 (10-22-23 @ 04:48) (129/64 - 141/60)  RR: 18 (10-22-23 @ 04:48)  SpO2: 96% (10-22-23 @ 04:48) (96% - 98%)  Wt(kg): --    PHYSICAL EXAM:  Constitutional: non-toxic, no distress  HEAD/EYES: anicteric, no conjunctival injection  ENT:  supple, no thrush, poor dentition  Cardiovascular:   normal S1, S2, no murmur, no edema  Respiratory:  decreased BS left lung  GI:  soft, non-tender, normal bowel sounds  :  no peters, no CVA tenderness  Musculoskeletal:  no synovitis, normal ROM  Neurologic: awake and alert, normal strength, no focal findings  Skin:  no rash, no erythema, no phlebitis  Heme/Onc: no lymphadenopathy   Psychiatric:  awake, alert, appropriate mood                            10.7   14.18 )-----------( 428      ( 22 Oct 2023 07:06 )             32.7   10-22    136  |  99  |  6<L>  ----------------------------<  101<H>  4.0   |  25  |  0.50    Ca    8.7      22 Oct 2023 07:06  Phos  3.0     10-22  Mg     1.90     10-22    TPro  7.0  /  Alb  2.8<L>  /  TBili  0.8  /  DBili  0.4<H>  /  AST  21  /  ALT  26  /  AlkPhos  127<H>  10-22    Urinalysis Basic - ( 22 Oct 2023 07:06 )    Color: x / Appearance: x / SG: x / pH: x  Gluc: 101 mg/dL / Ketone: x  / Bili: x / Urobili: x   Blood: x / Protein: x / Nitrite: x   Leuk Esterase: x / RBC: x / WBC x   Sq Epi: x / Non Sq Epi: x / Bacteria: x    MICROBIOLOGY:  Culture - Blood (collected 19 Oct 2023 20:55)  Source: .Blood Blood-Peripheral  Preliminary Report (22 Oct 2023 01:01):    No growth at 48 Hours    Culture - Blood (collected 19 Oct 2023 15:50)  Source: .Blood Blood-Peripheral  Preliminary Report (22 Oct 2023 01:01):    No growth at 48 Hours              Rapid RVP Result: NotDetec (10-19 @ 16:41)      RADIOLOGY:  imaging below personally reviewed and agree with findings    < from: CT Chest No Cont (10.19.23 @ 17:53) >  ACC: [id number] EXAM:  CT CHEST   ORDERED BY: RYDER CHAMORRO     PROCEDURE DATE:  10/19/2023          INTERPRETATION:  CLINICAL INFORMATION: Left pleural effusion    COMPARISON: Chest CT 1/14/2015, chest x-ray 10/19/2023    CONTRAST/COMPLICATIONS:  IV Contrast: None  Oral Contrast: None  Complications: None reported    PROCEDURE:  CT scan of the chest was obtained without intravenous contrast.    FINDINGS:    Trachea and mainstem bronchi patent. Lingular and left basilar   parenchymal opacification. Moderate size loculated left pleural effusion   and associated mediastinal pleural thickening (for example image 59,   series 2).    Right lower lobe superior segment 2.5 x 2 cm nodular consolidative   opacity (image 79, series 2).    Few mildly enlarged mediastinal nodes; reference precarinal 1.9 x 1.2 cm   node (image 57, series 2). Left internal mammary node measures 0.7 cm   (image 65, series 2).    Heart size normal. No pericardial effusion. Coronary calcifications.   Normal caliber thoracic aorta.    Cholecystectomy. Soft tissues and osseous structures unremarkable.      IMPRESSION:    Right lower lobe superior segment 2.5 cm nodular consolidative opacity;   differential diagnosis includes infection and primary lung malignancy.   Recommend CT chest follow-up in one month to help differentiate between   these two etiologies.    Moderate-sized loculated left pleural effusion with associated   mediastinal pleural thickening. The exact etiology is unclear, but   malignancy is a diagnostic consideration.    Lingular and left lower lobe parenchymal opacification may represent   atelectasis or pneumonia.    Mildly enlarged mediastinal nodes and left internal mammary node are   indeterminate and recommend attention on CT chest follow-up.      < end of copied text >  < from: US Abdomen Upper Quadrant Right (10.21.23 @ 14:27) >    ACC: [id number] EXAM:  US ABDOMEN RT UPR QUADRANT   ORDERED BY: DESI PAGE     PROCEDURE DATE:  10/21/2023          INTERPRETATION:  CLINICAL INFORMATION: Abnormal LFTs    COMPARISON: None available.    TECHNIQUE: Sonography of the right upper quadrant.    FINDINGS:  Liver: Within normal limits.  Bile ducts: Normal caliber. Common bile duct measures 3 mm.  Gallbladder: Cholecystectomy.  Pancreas: Visualized portions are within normal limits.  Right kidney: 12.3 cm. No hydronephrosis.  Ascites: None.  IVC: Visualized portions are within normal limits.    IMPRESSION:  Normal right upper quadrant abdominal ultrasound.    < end of copied text >

## 2023-10-22 NOTE — PROVIDER CONTACT NOTE (OTHER) - RECOMMENDATIONS
PO Tylenol as ordered for temp >100.4.
Pt has med at home and is willing to have someone bring in for verification, make provider aware.
ACP made aware
Order IV Tylenol and blood cultures.

## 2023-10-22 NOTE — CONSULT NOTE ADULT - ATTENDING COMMENTS
While not unreasonable to change antibiotics in the setting of fever, suspect persistent fever is more likely due to lack of source control as opposed to resistant maryanne. Patient dentition not great, aspiration in DDx, though no clear/convincing history, anaerobic coverage added back.   Drainage the crucial element here. Antibiotics clearly adjunctive  There are other elements in the DDx besides empyema, but latter remains the most probable.  Await pleural fluid sampling and drainage  Continue antibiotics  HIV test- no clear risk factors. Danny  4 years ago from lung cancer and Pneumococcus is a classical human pathogen.  Thank you for the courtesy of this referral.  Leonid Brizuela MD  Attending Physician  [facility]  Division of Infectious Diseases  [phone]

## 2023-10-22 NOTE — PROGRESS NOTE ADULT - SUBJECTIVE AND OBJECTIVE BOX
[facility] Division of Hospital Medicine  Rene Kessler) MD Moe  Pager [phone]    SUBJECTIVE:  Chief complaint: Fever.    Pt seen and evaluated a tbedside this AM. No o/n events. Still spiking temps. Still w/ left sided pleurtic pain. No new issues.      ROS: All systems negative except as noted.      Vital Signs Last 24 Hrs  T(C): 37.4 (22 Oct 2023 12:30), Max: 38.3 (22 Oct 2023 04:48)  T(F): 99.3 (22 Oct 2023 12:30), Max: 101 (22 Oct 2023 04:48)  HR: 79 (22 Oct 2023 12:30) (79 - 89)  BP: 113/70 (22 Oct 2023 12:30) (113/70 - 141/60)  BP(mean): --  RR: 16 (22 Oct 2023 12:30) (16 - 18)  SpO2: 97% (22 Oct 2023 12:30) (96% - 98%)    Parameters below as of 22 Oct 2023 12:30  Patient On (Oxygen Delivery Method): room air      PHYSICAL EXAM:  Gen- In bed, well-appearing, NAD. Speaks full sentences  Resp- CTAB, diminished at left base. no r/r/w.  CVS- RRR, S1S2, no g/r/m.  GI- Soft abd, NT, ND, +BSx4  Ext- No C/C.       MEDICATION:  MEDICATIONS  (STANDING):  budesonide  80 MICROgram(s)/formoterol 4.5 MICROgram(s) Inhaler 2 Puff(s) Inhalation two times a day  budesonide 160 MICROgram(s)/formoterol 4.5 MICROgram(s) Inhaler 2 Puff(s) Inhalation two times a day  cefepime   IVPB      cefepime   IVPB 2000 milliGRAM(s) IV Intermittent every 8 hours  cholecalciferol 5000 Unit(s) Oral daily  heparin   Injectable 5000 Unit(s) SubCutaneous every 12 hours  hydrochlorothiazide 12.5 milliGRAM(s) Oral daily  metroNIDAZOLE  IVPB 500 milliGRAM(s) IV Intermittent every 8 hours  montelukast 10 milliGRAM(s) Oral daily  multivitamin 1 Tablet(s) Oral daily  mupirocin 2% Ointment 1 Application(s) Topical two times a day  nebivolol 5 milliGRAM(s) Oral daily  sodium chloride 0.9%. 1000 milliLiter(s) (100 mL/Hr) IV Continuous <Continuous>    MEDICATIONS  (PRN):  acetaminophen     Tablet .. 650 milliGRAM(s) Oral every 6 hours PRN Temp greater or equal to 38C (100.4F), Mild Pain (1 - 3)  albuterol/ipratropium for Nebulization 3 milliLiter(s) Nebulizer every 6 hours PRN Shortness of Breath and/or Wheezing  bisacodyl 5 milliGRAM(s) Oral every 12 hours PRN Constipation  melatonin 3 milliGRAM(s) Oral at bedtime PRN Insomnia            LABORATORY:                          10.7   14.18 )-----------( 428      ( 22 Oct 2023 07:06 )             32.7     10-22    136  |  99  |  6<L>  ----------------------------<  101<H>  4.0   |  25  |  0.50    Ca    8.7      22 Oct 2023 07:06  Phos  3.0     10-22  Mg     1.90     10-22    TPro  7.0  /  Alb  2.8<L>  /  TBili  0.8  /  DBili  0.4<H>  /  AST  21  /  ALT  26  /  AlkPhos  127<H>  10-22    PT/INR - ( 22 Oct 2023 07:06 )   PT: 18.6 sec;   INR: 1.69 ratio         PTT - ( 22 Oct 2023 07:06 )  PTT:34.0 sec  Urinalysis Basic - ( 22 Oct 2023 07:06 )    Color: x / Appearance: x / SG: x / pH: x  Gluc: 101 mg/dL / Ketone: x  / Bili: x / Urobili: x   Blood: x / Protein: x / Nitrite: x   Leuk Esterase: x / RBC: x / WBC x   Sq Epi: x / Non Sq Epi: x / Bacteria: x            SARS-CoV-2: NotDetec (19 Oct 2023 16:41)

## 2023-10-22 NOTE — PROVIDER CONTACT NOTE (OTHER) - DATE AND TIME:
21-Oct-2023 01:07
20-Oct-2023 16:12
20-Oct-2023 20:15
20-Oct-2023 17:53
22-Oct-2023 17:10
20-Oct-2023 02:34
20-Oct-2023 11:33

## 2023-10-22 NOTE — PROVIDER CONTACT NOTE (OTHER) - ACTION/TREATMENT ORDERED:
Tylenol admin. To recheck temp in one hour.
Will continue to monitor.
No new order placed.
Provider will verify if medication is available in pharmacy. If not ok with pt having someone bring home med in and have it verified with pharmacy for use.
Tylenol IVPB admin. Awaiting hyperthermia blanker. Cold packs applied. Will recheck temp 1hour after tylenol admin.
Give PO Tylenol.

## 2023-10-22 NOTE — PROVIDER CONTACT NOTE (OTHER) - BACKGROUND
ASthma , PE
Pt admitted for fevers, h/o SVT
Pt admitted for pleural effusion
Pt admitted with pleural effusion.  Blood Cx sent 10/19 and 10/21 for ongoing fevers. On ABX. Temps monitored during day shift.
Pt admitted with fevers, dx with Left pleural effusion.
Admitted with fevers, Left pleural effusion
Pt admitted for pleural effusion

## 2023-10-22 NOTE — CHART NOTE - NSCHARTNOTEFT_GEN_A_CORE
PRE-INTERVENTIONAL RADIOLOGY PROCEDURE NOTE      Patient Age: 54yo    Patient Gender: Female    Procedure: drainage of Lt effusion & Chest tube placement     Diagnosis/Indication: Lt Pleural Effussion    Interventional Radiology Attending Physician: Dr. Vanegas     Ordering Attending Physician: Dr Rosa    Pertinent Medical History: Asthma, SVT    Pertinent labs:                      10.7   14.18 )-----------( 428      ( 22 Oct 2023 07:06 )             32.7       10-22    136  |  99  |  6<L>  ----------------------------<  101<H>  4.0   |  25  |  0.50    Ca    8.7      22 Oct 2023 07:06  Phos  3.0     10-22  Mg     1.90     10-22    TPro  7.0  /  Alb  2.8<L>  /  TBili  0.8  /  DBili  0.4<H>  /  AST  21  /  ALT  26  /  AlkPhos  127<H>  10-22      PT/INR - ( 22 Oct 2023 07:06 )   PT: 18.6 sec;   INR: 1.69 ratio         PTT - ( 22 Oct 2023 07:06 )  PTT:34.0 sec        Patient and Family Aware ? Yes English

## 2023-10-22 NOTE — CHART NOTE - NSCHARTNOTEFT_GEN_A_CORE
IR Follow-Up     INR of 1.69 today noted.     Plan for left chest tube tomorrow ; but need INR <1.50. Recommend reversal of coagulopathy and repeat coags prior to procedure tomorrow.     IR to re-evaluate tomorrow morning based on INR level on repeat.     can keep NPO tonight.     d/w Dr. Vanegas    --  Chalino Licea DO/HILTON  Interventional Radiology Resident (PGY-5)  Available on [employer] TEAMS    For EMERGENT inquiries/questions:  IR Pager ([facility]): [phone]  IR Pager ([facility]): [phone] ; [phone]    For non-emergent consults/questions:   Please place a sunrise order "Consult- Interventional Radiology" with an appropriate callback number    For questions about scheduling during appropriate work hours, call IR :  [facility]: [phone]  [facility]: [phone]    For outpatient IR booking:  [facility]: [phone]  [facility]: [phone]

## 2023-10-22 NOTE — PROVIDER CONTACT NOTE (OTHER) - ASSESSMENT
A&Ox4
AA&Ox4, pt c/o "feeling hot"
Pt is A&Ox4, VSS, afebrile
PT AOx4. No signs of distress. No SOB.
AA&Ox4.
VSS except for temp, Pt is A&Ox4
No distress at this time.

## 2023-10-22 NOTE — CONSULT NOTE ADULT - ASSESSMENT
Patient to be seen today. Patient to be seen today.    53 year old female, with past history significant for Asthma, SVT, HNP, Migraine presented to the ED for fever. Patient reports sudden onset of left sided chest pain one week ago and saw her PCP on 10/12 who suspected the cause to be costochondritis. She used meloxicam, flexeril and Ibuprofen, pain and discomfort did not improve. She developed fever (max of 102.5 F), chills, and coughing and went back to see her on 10/19  CXR was subsequently done, and patient was advised to go to the ED for further evaluation.    Vital signs upon ED presentation as follows: BP = 125/76, HR = 99, RR = 20, T = 38.1 to 39.1 C (100.5 to 102.4 F), O2 Sat = 95% on RA.  WBC 18. Diagnosed with Pleural Effusion and started on ceftriaxone and Metronidazole.     Thoracocentesis/ drainage is being planned but not done.    Remains febrile on antibiotics; abx switched to cefepime on 10/21, Metronidazole was stopped. Still with fevers which prompted ID consult.    RVP negative  Blood cultures 10/19 NGTD  Legionella urine Negative  MRSA negative    Ct chest : Moderate-sized loculated left pleural effusion with associated mediastinal pleural thickening.      # Left Loculated pleural effusion  # Right lower lobe 2.5 cm nodular consolidative opacity  Concern for infectious/malignant etiology  Continue with Cefepime; add metronidazole  Will need drainage of collection; send fluid studies and routine cultures  Obtain TTE  Malignancy workup/lung nodule follow up per primary    Incomplete note    All recommendations are tentative pending Attending Attestation.    Caleb Richards MD, PGY-4  ID Fellow  [employer] Teams Preferred  After 5pm/weekends call [phone]       53 year old female, with past history significant for Asthma, SVT, HNP, Migraine presented to the ED for fever. Patient reports sudden onset of left sided chest pain one week ago and saw her PCP on 10/12 who suspected the cause to be costochondritis. She used meloxicam, flexeril and Ibuprofen, pain and discomfort did not improve. She developed fever (max of 102.5 F), chills, and coughing and went back to see her on 10/19  CXR was subsequently done, and patient was advised to go to the ED for further evaluation.    Vital signs upon ED presentation as follows: BP = 125/76, HR = 99, RR = 20, T = 38.1 to 39.1 C (100.5 to 102.4 F), O2 Sat = 95% on RA.  WBC 18. Diagnosed with Pleural Effusion and started on ceftriaxone and Metronidazole.     Thoracocentesis/ drainage is being planned but not done.    Remains febrile on antibiotics; abx switched to cefepime on 10/21, Metronidazole was stopped. Still with fevers which prompted ID consult.    RVP negative  Blood cultures 10/19 NGTD  Legionella urine Negative  MRSA negative    Ct chest : Moderate-sized loculated left pleural effusion with associated mediastinal pleural thickening.      # Left Loculated pleural effusion  High suspicion for empyema  Concern for infectious/malignant etiology  Continue with Cefepime 2 gm Q8 hrs; add metronidazole 500 mg IV Q12hr  She is likely continue with fevers until drainage even with adequate antibiotic coverage  Will need drainage of collection; send fluid for routine fluid studies, routine AFB and fungal cultures  Screen for HIV  Check Urine pneumococcal antigen      Discussed with attending    Caleb Richards MD, PGY-4  ID Fellow  [employer] Teams Preferred  After 5pm/weekends call [phone]

## 2023-10-22 NOTE — PROGRESS NOTE ADULT - SUBJECTIVE AND OBJECTIVE BOX
Patient seen at bedside by Thoracic.  Resting comfortably in bed on RA, in NAD.  Reports pain and SOB when ambulated on Left side, but states when she is resting in bed, she is comfortable.  Plan for IR drainage of effusion with Dr. Vanegas tomorrow. Patient aware.    Vital Signs Last 24 Hrs  T(C): 36.7 (22 Oct 2023 06:00), Max: 38.3 (22 Oct 2023 04:48)  T(F): 98.1 (22 Oct 2023 06:00), Max: 101 (22 Oct 2023 04:48)  HR: 89 (22 Oct 2023 04:48) (82 - 89)  BP: 133/78 (22 Oct 2023 04:48) (129/64 - 141/60)  BP(mean): --  RR: 18 (22 Oct 2023 04:48) (17 - 18)  SpO2: 96% (22 Oct 2023 04:48) (96% - 98%)    Parameters below as of 22 Oct 2023 04:48  Patient On (Oxygen Delivery Method): room air    General: A&Ox3, NAD  HEENT: Normocephalic. Vision and hearing grossly intact, EOMI. Airway grossly patent, no stridor.  CV: RRR, well perfused  Resp: Breathing comfortably on RA, no resp distress, no accessory muscle use  MSK: DELMER x4  Pysch: Appropriate affect                          10.7   14.18 )-----------( 428      ( 22 Oct 2023 07:06 )             32.7       10-22    136  |  99  |  6<L>  ----------------------------<  101<H>  4.0   |  25  |  0.50    Ca    8.7      22 Oct 2023 07:06  Phos  3.0     10-22  Mg     1.90     10-22    TPro  7.0  /  Alb  2.8<L>  /  TBili  0.8  /  DBili  0.4<H>  /  AST  21  /  ALT  26  /  AlkPhos  127<H>  10-22    A/P:  53 year old female, with past history significant for Asthma, SVT, HNP, Migraine presented to the ED secondary to fever. Found to have left pleural effusion with consolidation of left Lower lobe  10/21 - no safe pocket for Thoracic to drain at bedside, IR reconsulted.  10/22 - IR to drain effusion tomorrow by Dr. Vanegas    - Plan for IR to drain effusion Monday with Dr. Vanegas.  - IR order, pre-procedure note, etc. per primary team.  - NPO p MN tonight, 10/22/23  - Continue care per primary team  - Thoracic to follow for drain management.  - Above per Dr. Ugalde

## 2023-10-22 NOTE — PROVIDER CONTACT NOTE (OTHER) - REASON
Temp 102F. Pt c/o "I feel hot again."
Pt refused 6pm metoprolol, states meds causes palpitations. Pt states she takes Bystolic 5mg daily in the morning
fever
Pt states "I feel hot." Temp 100.9F
Pt temp 102.2
Pt temp 102.0
Pt refuse medication Metoprolol. Pt state they take Bystolic instead.

## 2023-10-23 LAB
ALBUMIN FLD-MCNC: 2.8 G/DL — SIGNIFICANT CHANGE UP
ALBUMIN FLD-MCNC: 2.8 G/DL — SIGNIFICANT CHANGE UP
ANION GAP SERPL CALC-SCNC: 11 MMOL/L — SIGNIFICANT CHANGE UP (ref 7–14)
ANION GAP SERPL CALC-SCNC: 11 MMOL/L — SIGNIFICANT CHANGE UP (ref 7–14)
APTT BLD: 32.4 SEC — SIGNIFICANT CHANGE UP (ref 24.5–35.6)
APTT BLD: 32.4 SEC — SIGNIFICANT CHANGE UP (ref 24.5–35.6)
B PERT IGG+IGM PNL SER: ABNORMAL
B PERT IGG+IGM PNL SER: ABNORMAL
BLD GP AB SCN SERPL QL: NEGATIVE — SIGNIFICANT CHANGE UP
BLD GP AB SCN SERPL QL: NEGATIVE — SIGNIFICANT CHANGE UP
BUN SERPL-MCNC: 8 MG/DL — SIGNIFICANT CHANGE UP (ref 7–23)
BUN SERPL-MCNC: 8 MG/DL — SIGNIFICANT CHANGE UP (ref 7–23)
CALCIUM SERPL-MCNC: 8.7 MG/DL — SIGNIFICANT CHANGE UP (ref 8.4–10.5)
CALCIUM SERPL-MCNC: 8.7 MG/DL — SIGNIFICANT CHANGE UP (ref 8.4–10.5)
CHLORIDE SERPL-SCNC: 100 MMOL/L — SIGNIFICANT CHANGE UP (ref 98–107)
CHLORIDE SERPL-SCNC: 100 MMOL/L — SIGNIFICANT CHANGE UP (ref 98–107)
CO2 SERPL-SCNC: 25 MMOL/L — SIGNIFICANT CHANGE UP (ref 22–31)
CO2 SERPL-SCNC: 25 MMOL/L — SIGNIFICANT CHANGE UP (ref 22–31)
COLOR FLD: SIGNIFICANT CHANGE UP
COLOR FLD: SIGNIFICANT CHANGE UP
CREAT SERPL-MCNC: 0.51 MG/DL — SIGNIFICANT CHANGE UP (ref 0.5–1.3)
CREAT SERPL-MCNC: 0.51 MG/DL — SIGNIFICANT CHANGE UP (ref 0.5–1.3)
EGFR: 112 ML/MIN/1.73M2 — SIGNIFICANT CHANGE UP
EGFR: 112 ML/MIN/1.73M2 — SIGNIFICANT CHANGE UP
EOSINOPHIL # FLD: 0 % — SIGNIFICANT CHANGE UP
EOSINOPHIL # FLD: 0 % — SIGNIFICANT CHANGE UP
FLUID INTAKE SUBSTANCE CLASS: SIGNIFICANT CHANGE UP
FLUID INTAKE SUBSTANCE CLASS: SIGNIFICANT CHANGE UP
FOLATE+VIT B12 SERBLD-IMP: 0 % — SIGNIFICANT CHANGE UP
FOLATE+VIT B12 SERBLD-IMP: 0 % — SIGNIFICANT CHANGE UP
GLUCOSE FLD-MCNC: <5 MG/DL — SIGNIFICANT CHANGE UP
GLUCOSE FLD-MCNC: <5 MG/DL — SIGNIFICANT CHANGE UP
GLUCOSE SERPL-MCNC: 111 MG/DL — HIGH (ref 70–99)
GLUCOSE SERPL-MCNC: 111 MG/DL — HIGH (ref 70–99)
GRAM STN FLD: SIGNIFICANT CHANGE UP
GRAM STN FLD: SIGNIFICANT CHANGE UP
HCT VFR BLD CALC: 31.6 % — LOW (ref 34.5–45)
HCT VFR BLD CALC: 31.6 % — LOW (ref 34.5–45)
HGB BLD-MCNC: 10.6 G/DL — LOW (ref 11.5–15.5)
HGB BLD-MCNC: 10.6 G/DL — LOW (ref 11.5–15.5)
INR BLD: 1.5 RATIO — HIGH (ref 0.85–1.18)
INR BLD: 1.5 RATIO — HIGH (ref 0.85–1.18)
LDH SERPL L TO P-CCNC: SIGNIFICANT CHANGE UP U/L
LDH SERPL L TO P-CCNC: SIGNIFICANT CHANGE UP U/L
LYMPHOCYTES # FLD: 3 % — SIGNIFICANT CHANGE UP
LYMPHOCYTES # FLD: 3 % — SIGNIFICANT CHANGE UP
MAGNESIUM SERPL-MCNC: 1.8 MG/DL — SIGNIFICANT CHANGE UP (ref 1.6–2.6)
MAGNESIUM SERPL-MCNC: 1.8 MG/DL — SIGNIFICANT CHANGE UP (ref 1.6–2.6)
MCHC RBC-ENTMCNC: 31.2 PG — SIGNIFICANT CHANGE UP (ref 27–34)
MCHC RBC-ENTMCNC: 31.2 PG — SIGNIFICANT CHANGE UP (ref 27–34)
MCHC RBC-ENTMCNC: 33.5 GM/DL — SIGNIFICANT CHANGE UP (ref 32–36)
MCHC RBC-ENTMCNC: 33.5 GM/DL — SIGNIFICANT CHANGE UP (ref 32–36)
MCV RBC AUTO: 92.9 FL — SIGNIFICANT CHANGE UP (ref 80–100)
MCV RBC AUTO: 92.9 FL — SIGNIFICANT CHANGE UP (ref 80–100)
MESOTHL CELL # FLD: 0 % — SIGNIFICANT CHANGE UP
MESOTHL CELL # FLD: 0 % — SIGNIFICANT CHANGE UP
MONOS+MACROS # FLD: 9 % — SIGNIFICANT CHANGE UP
MONOS+MACROS # FLD: 9 % — SIGNIFICANT CHANGE UP
NEUTROPHILS-BODY FLUID: 88 % — SIGNIFICANT CHANGE UP
NEUTROPHILS-BODY FLUID: 88 % — SIGNIFICANT CHANGE UP
NRBC # BLD: 0 /100 WBCS — SIGNIFICANT CHANGE UP (ref 0–0)
NRBC # BLD: 0 /100 WBCS — SIGNIFICANT CHANGE UP (ref 0–0)
NRBC # FLD: 0 K/UL — SIGNIFICANT CHANGE UP (ref 0–0)
NRBC # FLD: 0 K/UL — SIGNIFICANT CHANGE UP (ref 0–0)
OTHER CELLS FLD MANUAL: 0 % — SIGNIFICANT CHANGE UP
OTHER CELLS FLD MANUAL: 0 % — SIGNIFICANT CHANGE UP
PHOSPHATE SERPL-MCNC: 3.6 MG/DL — SIGNIFICANT CHANGE UP (ref 2.5–4.5)
PHOSPHATE SERPL-MCNC: 3.6 MG/DL — SIGNIFICANT CHANGE UP (ref 2.5–4.5)
PLATELET # BLD AUTO: 423 K/UL — HIGH (ref 150–400)
PLATELET # BLD AUTO: 423 K/UL — HIGH (ref 150–400)
POTASSIUM SERPL-MCNC: 3.8 MMOL/L — SIGNIFICANT CHANGE UP (ref 3.5–5.3)
POTASSIUM SERPL-MCNC: 3.8 MMOL/L — SIGNIFICANT CHANGE UP (ref 3.5–5.3)
POTASSIUM SERPL-SCNC: 3.8 MMOL/L — SIGNIFICANT CHANGE UP (ref 3.5–5.3)
POTASSIUM SERPL-SCNC: 3.8 MMOL/L — SIGNIFICANT CHANGE UP (ref 3.5–5.3)
PROT FLD-MCNC: 5.5 G/DL — SIGNIFICANT CHANGE UP
PROT FLD-MCNC: 5.5 G/DL — SIGNIFICANT CHANGE UP
PROTHROM AB SERPL-ACNC: 16.7 SEC — HIGH (ref 9.5–13)
PROTHROM AB SERPL-ACNC: 16.7 SEC — HIGH (ref 9.5–13)
RBC # BLD: 3.4 M/UL — LOW (ref 3.8–5.2)
RBC # BLD: 3.4 M/UL — LOW (ref 3.8–5.2)
RBC # FLD: 11.9 % — SIGNIFICANT CHANGE UP (ref 10.3–14.5)
RBC # FLD: 11.9 % — SIGNIFICANT CHANGE UP (ref 10.3–14.5)
RCV VOL RI: HIGH CELLS/UL (ref 0–5)
RCV VOL RI: HIGH CELLS/UL (ref 0–5)
RH IG SCN BLD-IMP: POSITIVE — SIGNIFICANT CHANGE UP
RH IG SCN BLD-IMP: POSITIVE — SIGNIFICANT CHANGE UP
SODIUM SERPL-SCNC: 136 MMOL/L — SIGNIFICANT CHANGE UP (ref 135–145)
SODIUM SERPL-SCNC: 136 MMOL/L — SIGNIFICANT CHANGE UP (ref 135–145)
SPECIMEN SOURCE FLD: SIGNIFICANT CHANGE UP
SPECIMEN SOURCE: SIGNIFICANT CHANGE UP
SPECIMEN SOURCE: SIGNIFICANT CHANGE UP
TOTAL CELLS COUNTED, BODY FLUID: 100 CELLS — SIGNIFICANT CHANGE UP
TOTAL CELLS COUNTED, BODY FLUID: 100 CELLS — SIGNIFICANT CHANGE UP
TOTAL NUCLEATED CELL COUNT, BODY FLUID: HIGH CELLS/UL (ref 0–5)
TOTAL NUCLEATED CELL COUNT, BODY FLUID: HIGH CELLS/UL (ref 0–5)
TRIGL FLD-MCNC: 132 MG/DL — SIGNIFICANT CHANGE UP
TRIGL FLD-MCNC: 132 MG/DL — SIGNIFICANT CHANGE UP
TUBE TYPE: SIGNIFICANT CHANGE UP
TUBE TYPE: SIGNIFICANT CHANGE UP
WBC # BLD: 13.54 K/UL — HIGH (ref 3.8–10.5)
WBC # BLD: 13.54 K/UL — HIGH (ref 3.8–10.5)
WBC # FLD AUTO: 13.54 K/UL — HIGH (ref 3.8–10.5)
WBC # FLD AUTO: 13.54 K/UL — HIGH (ref 3.8–10.5)

## 2023-10-23 PROCEDURE — 99231 SBSQ HOSP IP/OBS SF/LOW 25: CPT

## 2023-10-23 PROCEDURE — 32557 INSERT CATH PLEURA W/ IMAGE: CPT | Mod: LT

## 2023-10-23 PROCEDURE — 99233 SBSQ HOSP IP/OBS HIGH 50: CPT

## 2023-10-23 PROCEDURE — 88305 TISSUE EXAM BY PATHOLOGIST: CPT | Mod: 26

## 2023-10-23 PROCEDURE — 88112 CYTOPATH CELL ENHANCE TECH: CPT | Mod: 26

## 2023-10-23 PROCEDURE — 99233 SBSQ HOSP IP/OBS HIGH 50: CPT | Mod: 57

## 2023-10-23 RX ADMIN — MONTELUKAST 10 MILLIGRAM(S): 4 TABLET, CHEWABLE ORAL at 11:25

## 2023-10-23 RX ADMIN — Medication 650 MILLIGRAM(S): at 17:41

## 2023-10-23 RX ADMIN — Medication 100 MILLIGRAM(S): at 05:24

## 2023-10-23 RX ADMIN — Medication 650 MILLIGRAM(S): at 18:45

## 2023-10-23 RX ADMIN — NEBIVOLOL HYDROCHLORIDE 5 MILLIGRAM(S): 5 TABLET ORAL at 05:25

## 2023-10-23 RX ADMIN — MUPIROCIN 1 APPLICATION(S): 20 OINTMENT TOPICAL at 17:42

## 2023-10-23 RX ADMIN — CEFEPIME 100 MILLIGRAM(S): 1 INJECTION, POWDER, FOR SOLUTION INTRAMUSCULAR; INTRAVENOUS at 22:44

## 2023-10-23 RX ADMIN — FLUTICASONE PROPIONATE AND SALMETEROL 1 DOSE(S): 50; 250 POWDER ORAL; RESPIRATORY (INHALATION) at 08:29

## 2023-10-23 RX ADMIN — MUPIROCIN 1 APPLICATION(S): 20 OINTMENT TOPICAL at 05:24

## 2023-10-23 RX ADMIN — Medication 5000 UNIT(S): at 11:25

## 2023-10-23 RX ADMIN — CEFEPIME 100 MILLIGRAM(S): 1 INJECTION, POWDER, FOR SOLUTION INTRAMUSCULAR; INTRAVENOUS at 04:40

## 2023-10-23 RX ADMIN — FLUTICASONE PROPIONATE AND SALMETEROL 1 DOSE(S): 50; 250 POWDER ORAL; RESPIRATORY (INHALATION) at 22:50

## 2023-10-23 RX ADMIN — CEFEPIME 100 MILLIGRAM(S): 1 INJECTION, POWDER, FOR SOLUTION INTRAMUSCULAR; INTRAVENOUS at 12:30

## 2023-10-23 RX ADMIN — Medication 1 TABLET(S): at 11:25

## 2023-10-23 RX ADMIN — Medication 100 MILLIGRAM(S): at 23:29

## 2023-10-23 NOTE — PROCEDURE NOTE - PROCEDURE FINDINGS AND DETAILS
Prior to initiation of procedure, patient had an episode of sinus tachycardia to 135-140. This was sustained over 1 minute, patient remained alert and blood pressure was stable. She stated she was nervous about the procedure and she did not have a similar episode like this for "a while" however stated that it did happen multiple times in the past. The tachycardia resolved spontaneously and the procedure was started.  12F drain placed under CT and ultrasound guidance. 60cc of purulent/white fluid aspirated. Catheter connected to pleurevac.

## 2023-10-23 NOTE — PROGRESS NOTE ADULT - SUBJECTIVE AND OBJECTIVE BOX
Pt seen and examined. Resting in bed. C/o pain at left PTC site.   No CP or SOB. s/p SVT episode in IR suite during PTC insertion.   + fevers overnight.     Vital Signs Last 24 Hrs  T(C): 37.1 (23 Oct 2023 05:15), Max: 37.7 (22 Oct 2023 18:25)  T(F): 98.8 (23 Oct 2023 05:15), Max: 99.9 (22 Oct 2023 18:25)  HR: 93 (23 Oct 2023 05:15) (87 - 93)  BP: 148/87 (23 Oct 2023 05:15) (134/73 - 148/87)  BP(mean): --  RR: 18 (23 Oct 2023 05:15) (18 - 18)  SpO2: 95% (23 Oct 2023 05:15) (95% - 97%)    Parameters below as of 23 Oct 2023 05:15  Patient On (Oxygen Delivery Method): room air                          10.6   13.54 )-----------( 423      ( 23 Oct 2023 05:15 )             31.6     10-23    136  |  100  |  8   ----------------------------<  111<H>  3.8   |  25  |  0.51    Ca    8.7      23 Oct 2023 05:15  Phos  3.6     10-23  Mg     1.80     10-23    TPro  7.0  /  Alb  2.8<L>  /  TBili  0.8  /  DBili  0.4<H>  /  AST  21  /  ALT  26  /  AlkPhos  127<H>  10-22      A+O x 3  Dec BS to left base  + fevers still  RRR  Abd soft  No LE edema    54yo F with fever, SOB, admitted to rib pain. Found to have lung consolidation and left pleural effusion.   s/p IR placement of left PTC today for purulent fluid.     -Continue PTC to suction  -CXR in am  -F/u fluid studies, cultures, cytology.   -Pain control  -Cont IV antibiotics, trend CBC and fever.   Will cont to follow.

## 2023-10-23 NOTE — PRE PROCEDURE NOTE - PRE PROCEDURE EVALUATION
------------------------------------------------------------  Interventional Radiology Pre-Procedure Note  ------------------------------------------------------------    Indication: 53y Female with pmh of pneumonia and sepsis who was found to have a loculated small left pleural effusion with unsuccessful attempts made by CT surgery presents for left pleural effusion pigtail drainage catheter placement.     Past Medical History:  SVT (supraventricular tachycardia)    Asthma    Migraine    Pyogenic granuloma    Schamberg's disease    Cervical spine degeneration    HNP (herniated nucleus pulposus), cervical    HNP (herniated nucleus pulposus), thoracic        Allergies: levofloxacin (Rash)  penicillin (Rash)  clarithromycin (Rash)  doxycycline (Rash)      Medications:    cefepime   IVPB: 100 mL/Hr IV Intermittent (10-23-23 @ 12:30)  cefepime   IVPB: 100 mL/Hr IV Intermittent (10-22-23 @ 21:06)  heparin   Injectable: 5000 Unit(s) SubCutaneous (10-22-23 @ 17:19)  hydrochlorothiazide: 12.5 milliGRAM(s) Oral (10-23-23 @ 05:25)  metroNIDAZOLE  IVPB: 100 mL/Hr IV Intermittent (10-23-23 @ 05:24)  nebivolol: 5 milliGRAM(s) Oral (10-23-23 @ 05:25)      Vital Signs:   T(F): 98.8 (05:15), Max: 102 (17:10)  HR: 93 (05:15)  BP: 148/87 (05:15)  RR: 18 (05:15)  SpO2: 95% (05:15)    Labs:           10.6  13.54)-----(423     (10-23-23 @ 05:15)         31.6     136 | 100 | 8  --------------------< 111     (10-23-23 @ 05:15)  3.8 | 25 | 0.51       PT: 16.7<H> 10-23-23 @ 05:15  aPTT: 32.4 10-23-23 @ 05:15   INR: 1.50<H> 10-23-23 @ 05:15    Imaging: CT chest 10/19/23 reviewed     Consent: Risks/benefits/alternatives were explained and informed written consent was obtained.     Procedure Plan: Plan for left pleural effusion drainage catheter placement.

## 2023-10-23 NOTE — PROCEDURE NOTE - PLAN
- monitor output   - low wall suction considering viscosity of fluid  - follow up results of sample sent.  - reminder of care per primary team.

## 2023-10-23 NOTE — PROGRESS NOTE ADULT - SUBJECTIVE AND OBJECTIVE BOX
[facility] Division of Hospital Medicine  Rene DuqueDerik) MD Moe  Pager [phone]    SUBJECTIVE:  Chief complaint: fever.    Pt seen and evaluated at bedside this AM. No o/n events. Denies any SOB/NV. Still having CP. Still w/ fever. Otherwise, no new issues.       ROS: All systems negative except as noted.      Vital Signs Last 24 Hrs  T(C): 37.1 (23 Oct 2023 05:15), Max: 38.9 (22 Oct 2023 17:10)  T(F): 98.8 (23 Oct 2023 05:15), Max: 102 (22 Oct 2023 17:10)  HR: 93 (23 Oct 2023 05:15) (87 - 93)  BP: 148/87 (23 Oct 2023 05:15) (134/73 - 148/87)  BP(mean): --  RR: 18 (23 Oct 2023 05:15) (18 - 18)  SpO2: 95% (23 Oct 2023 05:15) (95% - 97%)    Parameters below as of 23 Oct 2023 05:15  Patient On (Oxygen Delivery Method): room air      PHYSICAL EXAM:  Gen- In bed, well-appearing, NAD. Speaks full sentences  Resp- CTAB, diminished at left base. no r/r/w.  CVS- RRR, S1S2, no g/r/m.  GI- Soft abd, NT, ND, +BSx4  Ext- No C/C.       MEDICATION:  MEDICATIONS  (STANDING):  cefepime   IVPB 2000 milliGRAM(s) IV Intermittent every 8 hours  cholecalciferol 5000 Unit(s) Oral daily  fluticasone propionate/ salmeterol 250-50 MICROgram(s) Diskus 1 Dose(s) Inhalation two times a day  hydrochlorothiazide 12.5 milliGRAM(s) Oral daily  metroNIDAZOLE  IVPB 500 milliGRAM(s) IV Intermittent every 8 hours  montelukast 10 milliGRAM(s) Oral daily  multivitamin 1 Tablet(s) Oral daily  mupirocin 2% Ointment 1 Application(s) Topical two times a day  nebivolol 5 milliGRAM(s) Oral daily  sodium chloride 0.9%. 1000 milliLiter(s) (100 mL/Hr) IV Continuous <Continuous>    MEDICATIONS  (PRN):  acetaminophen     Tablet .. 650 milliGRAM(s) Oral every 6 hours PRN Temp greater or equal to 38C (100.4F), Mild Pain (1 - 3)  albuterol/ipratropium for Nebulization 3 milliLiter(s) Nebulizer every 6 hours PRN Shortness of Breath and/or Wheezing  bisacodyl 5 milliGRAM(s) Oral every 12 hours PRN Constipation  melatonin 3 milliGRAM(s) Oral at bedtime PRN Insomnia      LABORATORY:                          10.6   13.54 )-----------( 423      ( 23 Oct 2023 05:15 )             31.6     10-23    136  |  100  |  8   ----------------------------<  111<H>  3.8   |  25  |  0.51    Ca    8.7      23 Oct 2023 05:15  Phos  3.6     10-23  Mg     1.80     10-23    TPro  7.0  /  Alb  2.8<L>  /  TBili  0.8  /  DBili  0.4<H>  /  AST  21  /  ALT  26  /  AlkPhos  127<H>  10-22    PT/INR - ( 23 Oct 2023 05:15 )   PT: 16.7 sec;   INR: 1.50 ratio         PTT - ( 23 Oct 2023 05:15 )  PTT:32.4 sec  Urinalysis Basic - ( 23 Oct 2023 05:15 )    Color: x / Appearance: x / SG: x / pH: x  Gluc: 111 mg/dL / Ketone: x  / Bili: x / Urobili: x   Blood: x / Protein: x / Nitrite: x   Leuk Esterase: x / RBC: x / WBC x   Sq Epi: x / Non Sq Epi: x / Bacteria: x            SARS-CoV-2: NotDetec (19 Oct 2023 16:41)

## 2023-10-23 NOTE — PROGRESS NOTE ADULT - SUBJECTIVE AND OBJECTIVE BOX
[facility]  Division of Infectious Diseases  [phone]    Name: AKILAH MARQUEZ  Age: 53y  Gender: Female  MRN: [id number]    Interval History--  No interval notes. Overall, a quiet night. Hungry, wishing she had procedure done with. Overnight had episode of CP and coughed up some clear-yellowish sputum. Still febrile, not unexpected.     Past Medical History--  SVT (supraventricular tachycardia)    Asthma    Migraine    Pyogenic granuloma    Schamberg's disease    Cervical spine degeneration    HNP (herniated nucleus pulposus), cervical    HNP (herniated nucleus pulposus), thoracic    History of lumpectomy of left breast    History of cholecystectomy    Venous stasis ulcer of right lower extremity    Breast cyst, left        For details regarding the patient's social history, family history, and other miscellaneous elements, please refer the initial infectious diseases consultation and/or the admitting history and physical examination for this admission.    Allergies    levofloxacin (Rash)  penicillin (Rash)  clarithromycin (Rash)  doxycycline (Rash)    Intolerances        Medications--  Antibiotics:  cefepime   IVPB 2000 milliGRAM(s) IV Intermittent every 8 hours  metroNIDAZOLE  IVPB 500 milliGRAM(s) IV Intermittent every 8 hours    Immunologic:    Other:  acetaminophen     Tablet .. PRN  albuterol/ipratropium for Nebulization PRN  bisacodyl PRN  cholecalciferol  fluticasone propionate/ salmeterol 250-50 MICROgram(s) Diskus  hydrochlorothiazide  melatonin PRN  montelukast  multivitamin  mupirocin 2% Ointment  nebivolol  sodium chloride 0.9%.      Review of Systems--  A 10-point review of systems was obtained.   Review of systems otherwise unchanged compared to prior visit except as previously noted.    Physical Examination--  Vital Signs: T(F): 98.8 (10-23-23 @ 05:15), Max: 102 (10-22-23 @ 17:10)  HR: 93 (10-23-23 @ 05:15)  BP: 148/87 (10-23-23 @ 05:15)  RR: 18 (10-23-23 @ 05:15)  SpO2: 95% (10-23-23 @ 05:15)  Wt(kg): --  General: Nontoxic-appearing Female in no acute distress.  HEENT: AT/NC. Anicteric. Conjunctiva pink and moist. ?HSV lesion L labial commisure. Oropharynx clear. Dentition fair-poor.  Neck: Not rigid. No sense of mass.  Nodes: None palpable.  Lungs: Diminished BS B  Heart: Regular rate and rhythm.   Abdomen: Bowel sounds present and normoactive. Soft. Nondistended. Nontender.  Back: No spinal tenderness. No costovertebral angle tenderness.   Extremities: No cyanosis or clubbing. 1+ LE edema.   Skin: Warm. Dry. Good turgor. stasis-like changes LE.  No vasculitic stigmata.  Psychiatric: Appropriate affect and mood for situation.       Laboratory Studies--  CBC                        10.6   13.54 )-----------( 423      ( 23 Oct 2023 05:15 )             31.6       Chemistries  10-23    136  |  100  |  8   ----------------------------<  111<H>  3.8   |  25  |  0.51    Ca    8.7      23 Oct 2023 05:15  Phos  3.6     10-23  Mg     1.80     10-23    TPro  7.0  /  Alb  2.8<L>  /  TBili  0.8  /  DBili  0.4<H>  /  AST  21  /  ALT  26  /  AlkPhos  127<H>  10-22    Legionella Antigen, Urine: Negative (10.20.23 @ 13:05)  Streptococcus pneumoniae Ag, Ur Result: Negative (10.20.23 @ 13:05)        Culture Data    Culture - Blood (collected 21 Oct 2023 06:56)  Source: .Blood Blood-Peripheral  Preliminary Report (22 Oct 2023 11:01):    No growth at 24 hours    Culture - Blood (collected 21 Oct 2023 06:41)  Source: .Blood Blood-Peripheral  Preliminary Report (22 Oct 2023 11:01):    No growth at 24 hours    Culture - Blood (collected 19 Oct 2023 20:55)  Source: .Blood Blood-Peripheral  Preliminary Report (23 Oct 2023 01:01):    No growth at 72 Hours    Culture - Blood (collected 19 Oct 2023 15:50)  Source: .Blood Blood-Peripheral  Preliminary Report (23 Oct 2023 01:01):    No growth at 72 Hours

## 2023-10-24 LAB
ALBUMIN SERPL ELPH-MCNC: 3 G/DL — LOW (ref 3.3–5)
ALBUMIN SERPL ELPH-MCNC: 3 G/DL — LOW (ref 3.3–5)
ALBUMIN SERPL ELPH-MCNC: 3.1 G/DL — LOW (ref 3.3–5)
ALBUMIN SERPL ELPH-MCNC: 3.1 G/DL — LOW (ref 3.3–5)
ALP SERPL-CCNC: 121 U/L — HIGH (ref 40–120)
ALP SERPL-CCNC: 121 U/L — HIGH (ref 40–120)
ALT FLD-CCNC: 38 U/L — HIGH (ref 4–33)
ALT FLD-CCNC: 38 U/L — HIGH (ref 4–33)
ANION GAP SERPL CALC-SCNC: 12 MMOL/L — SIGNIFICANT CHANGE UP (ref 7–14)
ANION GAP SERPL CALC-SCNC: 12 MMOL/L — SIGNIFICANT CHANGE UP (ref 7–14)
AST SERPL-CCNC: 48 U/L — HIGH (ref 4–32)
AST SERPL-CCNC: 48 U/L — HIGH (ref 4–32)
BASOPHILS # BLD AUTO: 0.06 K/UL — SIGNIFICANT CHANGE UP (ref 0–0.2)
BASOPHILS # BLD AUTO: 0.06 K/UL — SIGNIFICANT CHANGE UP (ref 0–0.2)
BASOPHILS NFR BLD AUTO: 0.6 % — SIGNIFICANT CHANGE UP (ref 0–2)
BASOPHILS NFR BLD AUTO: 0.6 % — SIGNIFICANT CHANGE UP (ref 0–2)
BILIRUB DIRECT SERPL-MCNC: 0.3 MG/DL — SIGNIFICANT CHANGE UP (ref 0–0.3)
BILIRUB DIRECT SERPL-MCNC: 0.3 MG/DL — SIGNIFICANT CHANGE UP (ref 0–0.3)
BILIRUB INDIRECT FLD-MCNC: 0.4 MG/DL — SIGNIFICANT CHANGE UP (ref 0–1)
BILIRUB INDIRECT FLD-MCNC: 0.4 MG/DL — SIGNIFICANT CHANGE UP (ref 0–1)
BILIRUB SERPL-MCNC: 0.7 MG/DL — SIGNIFICANT CHANGE UP (ref 0.2–1.2)
BILIRUB SERPL-MCNC: 0.7 MG/DL — SIGNIFICANT CHANGE UP (ref 0.2–1.2)
BUN SERPL-MCNC: 9 MG/DL — SIGNIFICANT CHANGE UP (ref 7–23)
BUN SERPL-MCNC: 9 MG/DL — SIGNIFICANT CHANGE UP (ref 7–23)
CALCIUM SERPL-MCNC: 9.1 MG/DL — SIGNIFICANT CHANGE UP (ref 8.4–10.5)
CALCIUM SERPL-MCNC: 9.1 MG/DL — SIGNIFICANT CHANGE UP (ref 8.4–10.5)
CHLORIDE SERPL-SCNC: 95 MMOL/L — LOW (ref 98–107)
CHLORIDE SERPL-SCNC: 95 MMOL/L — LOW (ref 98–107)
CO2 SERPL-SCNC: 24 MMOL/L — SIGNIFICANT CHANGE UP (ref 22–31)
CO2 SERPL-SCNC: 24 MMOL/L — SIGNIFICANT CHANGE UP (ref 22–31)
CREAT SERPL-MCNC: 0.5 MG/DL — SIGNIFICANT CHANGE UP (ref 0.5–1.3)
CREAT SERPL-MCNC: 0.5 MG/DL — SIGNIFICANT CHANGE UP (ref 0.5–1.3)
EGFR: 113 ML/MIN/1.73M2 — SIGNIFICANT CHANGE UP
EGFR: 113 ML/MIN/1.73M2 — SIGNIFICANT CHANGE UP
EOSINOPHIL # BLD AUTO: 0.27 K/UL — SIGNIFICANT CHANGE UP (ref 0–0.5)
EOSINOPHIL # BLD AUTO: 0.27 K/UL — SIGNIFICANT CHANGE UP (ref 0–0.5)
EOSINOPHIL NFR BLD AUTO: 2.9 % — SIGNIFICANT CHANGE UP (ref 0–6)
EOSINOPHIL NFR BLD AUTO: 2.9 % — SIGNIFICANT CHANGE UP (ref 0–6)
GLUCOSE SERPL-MCNC: 93 MG/DL — SIGNIFICANT CHANGE UP (ref 70–99)
GLUCOSE SERPL-MCNC: 93 MG/DL — SIGNIFICANT CHANGE UP (ref 70–99)
HCT VFR BLD CALC: 34.3 % — LOW (ref 34.5–45)
HCT VFR BLD CALC: 34.3 % — LOW (ref 34.5–45)
HGB BLD-MCNC: 11.8 G/DL — SIGNIFICANT CHANGE UP (ref 11.5–15.5)
HGB BLD-MCNC: 11.8 G/DL — SIGNIFICANT CHANGE UP (ref 11.5–15.5)
HIV 1+2 AB+HIV1 P24 AG SERPL QL IA: SIGNIFICANT CHANGE UP
HIV 1+2 AB+HIV1 P24 AG SERPL QL IA: SIGNIFICANT CHANGE UP
IANC: 6.31 K/UL — SIGNIFICANT CHANGE UP (ref 1.8–7.4)
IANC: 6.31 K/UL — SIGNIFICANT CHANGE UP (ref 1.8–7.4)
IMM GRANULOCYTES NFR BLD AUTO: 2.9 % — HIGH (ref 0–0.9)
IMM GRANULOCYTES NFR BLD AUTO: 2.9 % — HIGH (ref 0–0.9)
LDH SERPL L TO P-CCNC: 280 U/L — HIGH (ref 135–225)
LDH SERPL L TO P-CCNC: 280 U/L — HIGH (ref 135–225)
LYMPHOCYTES # BLD AUTO: 1.68 K/UL — SIGNIFICANT CHANGE UP (ref 1–3.3)
LYMPHOCYTES # BLD AUTO: 1.68 K/UL — SIGNIFICANT CHANGE UP (ref 1–3.3)
LYMPHOCYTES # BLD AUTO: 17.9 % — SIGNIFICANT CHANGE UP (ref 13–44)
LYMPHOCYTES # BLD AUTO: 17.9 % — SIGNIFICANT CHANGE UP (ref 13–44)
MAGNESIUM SERPL-MCNC: 2.2 MG/DL — SIGNIFICANT CHANGE UP (ref 1.6–2.6)
MAGNESIUM SERPL-MCNC: 2.2 MG/DL — SIGNIFICANT CHANGE UP (ref 1.6–2.6)
MCHC RBC-ENTMCNC: 32.2 PG — SIGNIFICANT CHANGE UP (ref 27–34)
MCHC RBC-ENTMCNC: 32.2 PG — SIGNIFICANT CHANGE UP (ref 27–34)
MCHC RBC-ENTMCNC: 34.4 GM/DL — SIGNIFICANT CHANGE UP (ref 32–36)
MCHC RBC-ENTMCNC: 34.4 GM/DL — SIGNIFICANT CHANGE UP (ref 32–36)
MCV RBC AUTO: 93.7 FL — SIGNIFICANT CHANGE UP (ref 80–100)
MCV RBC AUTO: 93.7 FL — SIGNIFICANT CHANGE UP (ref 80–100)
MONOCYTES # BLD AUTO: 0.77 K/UL — SIGNIFICANT CHANGE UP (ref 0–0.9)
MONOCYTES # BLD AUTO: 0.77 K/UL — SIGNIFICANT CHANGE UP (ref 0–0.9)
MONOCYTES NFR BLD AUTO: 8.2 % — SIGNIFICANT CHANGE UP (ref 2–14)
MONOCYTES NFR BLD AUTO: 8.2 % — SIGNIFICANT CHANGE UP (ref 2–14)
NEUTROPHILS # BLD AUTO: 6.31 K/UL — SIGNIFICANT CHANGE UP (ref 1.8–7.4)
NEUTROPHILS # BLD AUTO: 6.31 K/UL — SIGNIFICANT CHANGE UP (ref 1.8–7.4)
NEUTROPHILS NFR BLD AUTO: 67.5 % — SIGNIFICANT CHANGE UP (ref 43–77)
NEUTROPHILS NFR BLD AUTO: 67.5 % — SIGNIFICANT CHANGE UP (ref 43–77)
NIGHT BLUE STAIN TISS: SIGNIFICANT CHANGE UP
NIGHT BLUE STAIN TISS: SIGNIFICANT CHANGE UP
NRBC # BLD: 0 /100 WBCS — SIGNIFICANT CHANGE UP (ref 0–0)
NRBC # BLD: 0 /100 WBCS — SIGNIFICANT CHANGE UP (ref 0–0)
NRBC # FLD: 0 K/UL — SIGNIFICANT CHANGE UP (ref 0–0)
NRBC # FLD: 0 K/UL — SIGNIFICANT CHANGE UP (ref 0–0)
PH FLD: 6.6 — SIGNIFICANT CHANGE UP
PH FLD: 6.6 — SIGNIFICANT CHANGE UP
PHOSPHATE SERPL-MCNC: 3.4 MG/DL — SIGNIFICANT CHANGE UP (ref 2.5–4.5)
PHOSPHATE SERPL-MCNC: 3.4 MG/DL — SIGNIFICANT CHANGE UP (ref 2.5–4.5)
PLATELET # BLD AUTO: 495 K/UL — HIGH (ref 150–400)
PLATELET # BLD AUTO: 495 K/UL — HIGH (ref 150–400)
POTASSIUM SERPL-MCNC: 4 MMOL/L — SIGNIFICANT CHANGE UP (ref 3.5–5.3)
POTASSIUM SERPL-MCNC: 4 MMOL/L — SIGNIFICANT CHANGE UP (ref 3.5–5.3)
POTASSIUM SERPL-SCNC: 4 MMOL/L — SIGNIFICANT CHANGE UP (ref 3.5–5.3)
POTASSIUM SERPL-SCNC: 4 MMOL/L — SIGNIFICANT CHANGE UP (ref 3.5–5.3)
PROT SERPL-MCNC: 7.5 G/DL — SIGNIFICANT CHANGE UP (ref 6–8.3)
PROT SERPL-MCNC: 7.5 G/DL — SIGNIFICANT CHANGE UP (ref 6–8.3)
PROT SERPL-MCNC: 7.7 G/DL — SIGNIFICANT CHANGE UP (ref 6–8.3)
PROT SERPL-MCNC: 7.7 G/DL — SIGNIFICANT CHANGE UP (ref 6–8.3)
RBC # BLD: 3.66 M/UL — LOW (ref 3.8–5.2)
RBC # BLD: 3.66 M/UL — LOW (ref 3.8–5.2)
RBC # FLD: 12.1 % — SIGNIFICANT CHANGE UP (ref 10.3–14.5)
RBC # FLD: 12.1 % — SIGNIFICANT CHANGE UP (ref 10.3–14.5)
SODIUM SERPL-SCNC: 131 MMOL/L — LOW (ref 135–145)
SODIUM SERPL-SCNC: 131 MMOL/L — LOW (ref 135–145)
SPECIMEN SOURCE FLD: SIGNIFICANT CHANGE UP
SPECIMEN SOURCE FLD: SIGNIFICANT CHANGE UP
SPECIMEN SOURCE: SIGNIFICANT CHANGE UP
SPECIMEN SOURCE: SIGNIFICANT CHANGE UP
WBC # BLD: 9.36 K/UL — SIGNIFICANT CHANGE UP (ref 3.8–10.5)
WBC # BLD: 9.36 K/UL — SIGNIFICANT CHANGE UP (ref 3.8–10.5)
WBC # FLD AUTO: 9.36 K/UL — SIGNIFICANT CHANGE UP (ref 3.8–10.5)
WBC # FLD AUTO: 9.36 K/UL — SIGNIFICANT CHANGE UP (ref 3.8–10.5)

## 2023-10-24 PROCEDURE — 99233 SBSQ HOSP IP/OBS HIGH 50: CPT

## 2023-10-24 PROCEDURE — 99233 SBSQ HOSP IP/OBS HIGH 50: CPT | Mod: 57

## 2023-10-24 PROCEDURE — 99232 SBSQ HOSP IP/OBS MODERATE 35: CPT

## 2023-10-24 PROCEDURE — 71045 X-RAY EXAM CHEST 1 VIEW: CPT | Mod: 26

## 2023-10-24 PROCEDURE — 32561 LYSE CHEST FIBRIN INIT DAY: CPT | Mod: AS

## 2023-10-24 RX ORDER — ALTEPLASE 100 MG
10 KIT INTRAVENOUS ONCE
Refills: 0 | Status: DISCONTINUED | OUTPATIENT
Start: 2023-10-24 | End: 2023-10-27

## 2023-10-24 RX ORDER — SODIUM CHLORIDE 9 MG/ML
20 INJECTION INTRAMUSCULAR; INTRAVENOUS; SUBCUTANEOUS ONCE
Refills: 0 | Status: DISCONTINUED | OUTPATIENT
Start: 2023-10-24 | End: 2023-10-27

## 2023-10-24 RX ORDER — DORNASE ALFA 1 MG/ML
5 SOLUTION RESPIRATORY (INHALATION) ONCE
Refills: 0 | Status: DISCONTINUED | OUTPATIENT
Start: 2023-10-24 | End: 2023-10-27

## 2023-10-24 RX ADMIN — Medication 650 MILLIGRAM(S): at 20:15

## 2023-10-24 RX ADMIN — CEFEPIME 100 MILLIGRAM(S): 1 INJECTION, POWDER, FOR SOLUTION INTRAMUSCULAR; INTRAVENOUS at 12:34

## 2023-10-24 RX ADMIN — MUPIROCIN 1 APPLICATION(S): 20 OINTMENT TOPICAL at 17:51

## 2023-10-24 RX ADMIN — Medication 650 MILLIGRAM(S): at 12:32

## 2023-10-24 RX ADMIN — Medication 650 MILLIGRAM(S): at 07:28

## 2023-10-24 RX ADMIN — Medication 100 MILLIGRAM(S): at 06:33

## 2023-10-24 RX ADMIN — Medication 650 MILLIGRAM(S): at 01:20

## 2023-10-24 RX ADMIN — Medication 650 MILLIGRAM(S): at 06:33

## 2023-10-24 RX ADMIN — MONTELUKAST 10 MILLIGRAM(S): 4 TABLET, CHEWABLE ORAL at 12:25

## 2023-10-24 RX ADMIN — Medication 5000 UNIT(S): at 12:25

## 2023-10-24 RX ADMIN — MUPIROCIN 1 APPLICATION(S): 20 OINTMENT TOPICAL at 05:19

## 2023-10-24 RX ADMIN — Medication 100 MILLIGRAM(S): at 13:27

## 2023-10-24 RX ADMIN — FLUTICASONE PROPIONATE AND SALMETEROL 1 DOSE(S): 50; 250 POWDER ORAL; RESPIRATORY (INHALATION) at 12:23

## 2023-10-24 RX ADMIN — Medication 650 MILLIGRAM(S): at 20:45

## 2023-10-24 RX ADMIN — NEBIVOLOL HYDROCHLORIDE 5 MILLIGRAM(S): 5 TABLET ORAL at 05:18

## 2023-10-24 RX ADMIN — CEFEPIME 100 MILLIGRAM(S): 1 INJECTION, POWDER, FOR SOLUTION INTRAMUSCULAR; INTRAVENOUS at 05:16

## 2023-10-24 RX ADMIN — CEFEPIME 100 MILLIGRAM(S): 1 INJECTION, POWDER, FOR SOLUTION INTRAMUSCULAR; INTRAVENOUS at 20:32

## 2023-10-24 RX ADMIN — Medication 650 MILLIGRAM(S): at 13:00

## 2023-10-24 RX ADMIN — Medication 1 TABLET(S): at 12:25

## 2023-10-24 RX ADMIN — Medication 100 MILLIGRAM(S): at 21:34

## 2023-10-24 RX ADMIN — FLUTICASONE PROPIONATE AND SALMETEROL 1 DOSE(S): 50; 250 POWDER ORAL; RESPIRATORY (INHALATION) at 20:33

## 2023-10-24 RX ADMIN — Medication 650 MILLIGRAM(S): at 00:23

## 2023-10-24 NOTE — PROGRESS NOTE ADULT - SUBJECTIVE AND OBJECTIVE BOX
Subjective: patient seen and examined with thoracic surgery team  pt without acute complaints  pain controlled  pt denies chest pain or shortness of breath  using incentive spirometer  on bowel regimen, +flatus, +BM  ambulatory with assistance  d/w attending on morning TEAMS report  pt agreeable to MIST therapy  consent in chart      Vital Signs:  Vital Signs Last 24 Hrs  T(C): 36.8 (10-24-23 @ 12:24), Max: 37.1 (10-23-23 @ 22:22)  T(F): 98.3 (10-24-23 @ 12:24), Max: 98.8 (10-23-23 @ 22:22)  HR: 81 (10-24-23 @ 12:24) (78 - 81)  BP: 136/64 (10-24-23 @ 12:24) (115/78 - 136/64)  RR: 18 (10-24-23 @ 12:24) (18 - 19)  SpO2: 99% (10-24-23 @ 12:24) (96% - 99%) on (O2)    PE  General: awake and alert NAD  Neurology: A&Ox3, nonfocal, DOWELL x 4  Respiratory: CTA B/L  CV: RRR, S1S2, no murmurs, rubs or gallops  Abdominal: Soft, NT, ND +BS, Last BM  Extremities: No edema, + peripheral pulses  Incisions: c,d,i  Tubes: L PTC on suction drained 62cc/24h serous fluid   Relevant labs, radiology and Medications reviewed                        11.8   9.36  )-----------( 495      ( 24 Oct 2023 06:20 )             34.3     10-24    131<L>  |  95<L>  |  9   ----------------------------<  93  4.0   |  24  |  0.50    Ca    9.1      24 Oct 2023 06:20  Phos  3.4     10-24  Mg     2.20     10-24    TPro  7.5  /  Alb  3.0<L>  /  TBili  0.7  /  DBili  0.3  /  AST  48<H>  /  ALT  38<H>  /  AlkPhos  121<H>  10-24    PT/INR - ( 23 Oct 2023 05:15 )   PT: 16.7 sec;   INR: 1.50 ratio         PTT - ( 23 Oct 2023 05:15 )  PTT:32.4 sec  MEDICATIONS  (STANDING):  alteplase  Injectable for Pleural Effusion 10 milliGRAM(s) IntraPleural. once  cefepime   IVPB 2000 milliGRAM(s) IV Intermittent every 8 hours  cholecalciferol 5000 Unit(s) Oral daily  dornase andrea Solution for Pleural Effusion 5 milliGRAM(s) IntraPleural. once  fluticasone propionate/ salmeterol 250-50 MICROgram(s) Diskus 1 Dose(s) Inhalation two times a day  hydrochlorothiazide 12.5 milliGRAM(s) Oral daily  metroNIDAZOLE  IVPB 500 milliGRAM(s) IV Intermittent every 8 hours  montelukast 10 milliGRAM(s) Oral daily  multivitamin 1 Tablet(s) Oral daily  mupirocin 2% Ointment 1 Application(s) Topical two times a day  nebivolol 5 milliGRAM(s) Oral daily  sodium chloride 0.9% Solution for Pleural Effusion 20 milliLiter(s) IntraPleural. once  sodium chloride 0.9%. 1000 milliLiter(s) (100 mL/Hr) IV Continuous <Continuous>    MEDICATIONS  (PRN):  acetaminophen     Tablet .. 650 milliGRAM(s) Oral every 6 hours PRN Temp greater or equal to 38C (100.4F), Mild Pain (1 - 3)  albuterol/ipratropium for Nebulization 3 milliLiter(s) Nebulizer every 6 hours PRN Shortness of Breath and/or Wheezing  bisacodyl 5 milliGRAM(s) Oral every 12 hours PRN Constipation  melatonin 3 milliGRAM(s) Oral at bedtime PRN Insomnia    Pertinent Physical Exam  I&O's Summary    24 Oct 2023 07:01  -  24 Oct 2023 13:16  --------------------------------------------------------  IN: 0 mL / OUT: 62 mL / NET: -62 mL         Social History:  · Substance use	No  · Social History (marital status, living situation, occupation, and sexual history)	SOCIAL HISTORY:    Marital Status:  (  )    (  ) Single        (  )        (  )        ( x )   Lives with:          (  ) Alone      (  ) Spouse     (  ) Children         (  ) Parents             (  ) Other    No personal history of smoking  History of second hand smoking exposure (parents, )  No history of alcohol abuse  No history of illegal drug use        ASSESSMENT  54yo F with fever, SOB, admitted to rib pain. Found to have lung consolidation and left pleural effusion.   s/p IR placement of left PTC today for purulent fluid. MIST x1 today    PLAN  - MIST x1 today, pt tolerated procedure well  -will continue PTC to suction after 2pm  -f/u CXR in am  -F/u fluid studies, cultures, cytology.   -Pain control  -Cont IV antibiotics, trend CBC and fever.   Will cont to follow.    Thoracic [phone]

## 2023-10-24 NOTE — PROGRESS NOTE ADULT - SUBJECTIVE AND OBJECTIVE BOX
[facility] Division of Hospital Medicine  Rene Kessler) MD Moe  Pager [phone]    SUBJECTIVE:  Chief complaint: Empyema.    Pt seen and evaluated at bedside. Feels much better. States pleuritic pain has improved. Longer interval w/o fever. Overall no new issues.      ROS: All systems negative except as noted.      Vital Signs Last 24 Hrs  T(C): 36.8 (24 Oct 2023 12:24), Max: 37.1 (23 Oct 2023 22:22)  T(F): 98.3 (24 Oct 2023 12:24), Max: 98.8 (23 Oct 2023 22:22)  HR: 81 (24 Oct 2023 12:24) (78 - 81)  BP: 136/64 (24 Oct 2023 12:24) (115/78 - 136/64)  BP(mean): --  RR: 18 (24 Oct 2023 12:24) (18 - 19)  SpO2: 99% (24 Oct 2023 12:24) (96% - 99%)    Parameters below as of 24 Oct 2023 12:24  Patient On (Oxygen Delivery Method): room air          PHYSICAL EXAM:  Gen- In chair, well-appearing, NAD. Speaks full sentences  Resp- CTAB, improved air entry at left base. no r/r/w.  CVS- RRR, S1S2, no g/r/m.  GI- Soft abd, NT, ND, +BSx4  Ext- No C/C.       MEDICATION:  MEDICATIONS  (STANDING):  alteplase  Injectable for Pleural Effusion 10 milliGRAM(s) IntraPleural. once  cefepime   IVPB 2000 milliGRAM(s) IV Intermittent every 8 hours  cholecalciferol 5000 Unit(s) Oral daily  dornase andrea Solution for Pleural Effusion 5 milliGRAM(s) IntraPleural. once  fluticasone propionate/ salmeterol 250-50 MICROgram(s) Diskus 1 Dose(s) Inhalation two times a day  hydrochlorothiazide 12.5 milliGRAM(s) Oral daily  metroNIDAZOLE  IVPB 500 milliGRAM(s) IV Intermittent every 8 hours  montelukast 10 milliGRAM(s) Oral daily  multivitamin 1 Tablet(s) Oral daily  mupirocin 2% Ointment 1 Application(s) Topical two times a day  nebivolol 5 milliGRAM(s) Oral daily  sodium chloride 0.9% Solution for Pleural Effusion 20 milliLiter(s) IntraPleural. once  sodium chloride 0.9%. 1000 milliLiter(s) (100 mL/Hr) IV Continuous <Continuous>    MEDICATIONS  (PRN):  acetaminophen     Tablet .. 650 milliGRAM(s) Oral every 6 hours PRN Temp greater or equal to 38C (100.4F), Mild Pain (1 - 3)  albuterol/ipratropium for Nebulization 3 milliLiter(s) Nebulizer every 6 hours PRN Shortness of Breath and/or Wheezing  bisacodyl 5 milliGRAM(s) Oral every 12 hours PRN Constipation  melatonin 3 milliGRAM(s) Oral at bedtime PRN Insomnia            LABORATORY:                          11.8   9.36  )-----------( 495      ( 24 Oct 2023 06:20 )             34.3     10-24    131<L>  |  95<L>  |  9   ----------------------------<  93  4.0   |  24  |  0.50    Ca    9.1      24 Oct 2023 06:20  Phos  3.4     10-24  Mg     2.20     10-24    TPro  7.5  /  Alb  3.0<L>  /  TBili  0.7  /  DBili  0.3  /  AST  48<H>  /  ALT  38<H>  /  AlkPhos  121<H>  10-24    PT/INR - ( 23 Oct 2023 05:15 )   PT: 16.7 sec;   INR: 1.50 ratio         PTT - ( 23 Oct 2023 05:15 )  PTT:32.4 sec  Urinalysis Basic - ( 24 Oct 2023 06:20 )    Color: x / Appearance: x / SG: x / pH: x  Gluc: 93 mg/dL / Ketone: x  / Bili: x / Urobili: x   Blood: x / Protein: x / Nitrite: x   Leuk Esterase: x / RBC: x / WBC x   Sq Epi: x / Non Sq Epi: x / Bacteria: x            SARS-CoV-2: NotDetec (19 Oct 2023 16:41)

## 2023-10-24 NOTE — PROGRESS NOTE ADULT - SUBJECTIVE AND OBJECTIVE BOX
54yo Female s/p left chest tube placement on 10/23 in Interventional Radiology.     Patient seen and examined at bedside, sitting on side of bed.   Reports pain is mildly improved, still has some tenderness at site.     T(F): 98.3 (10-24-23 @ 05:14), Max: 98.8 (10-23-23 @ 22:22)  HR: 80 (10-23-23 @ 22:22) (78 - 80)  BP: 123/81 (10-24-23 @ 05:14) (115/78 - 129/71)  RR: 19 (10-24-23 @ 05:14) (18 - 19)  SpO2: 99% (10-24-23 @ 05:14) (96% - 99%)    LABS:                        11.8   9.36  )-----------( 495      ( 24 Oct 2023 06:20 )             34.3     10-24    131<L>  |  95<L>  |  9   ----------------------------<  93  4.0   |  24  |  0.50    Ca    9.1      24 Oct 2023 06:20  Phos  3.4     10-24  Mg     2.20     10-24    TPro  7.5  /  Alb  3.0<L>  /  TBili  0.7  /  DBili  0.3  /  AST  48<H>  /  ALT  38<H>  /  AlkPhos  121<H>  10-24    PT/INR - ( 23 Oct 2023 05:15 )   PT: 16.7 sec;   INR: 1.50 ratio         PTT - ( 23 Oct 2023 05:15 )  PTT:32.4 sec  I&O's Detail    24 Oct 2023 07:01  -  24 Oct 2023 10:02  --------------------------------------------------------  IN:  Total IN: 0 mL    OUT:    Chest Tube (mL): 62 mL  Total OUT: 62 mL    Total NET: -62 mL    PHYSICAL EXAM:  General: Nontoxic, in NAD  Chest Tube: Dressing c/d/i, to Pleur-evac suction

## 2023-10-24 NOTE — PROGRESS NOTE ADULT - SUBJECTIVE AND OBJECTIVE BOX
PULMONARY PROGRESS NOTE    AKILAH MARQUEZ  MRN-[id number]    Patient is a 53y old  Female who presents with a chief complaint of Sepsis due to undetermined organism, Left lower lobe pneumonia, Loculate pleural effusion (24 Oct 2023 10:01)      HPI:  -  -s/p pigtail  no fever overnight  chest pain nearly resolved      ROS:   -    ACTIVE MEDICATION LIST:  MEDICATIONS  (STANDING):  cefepime   IVPB 2000 milliGRAM(s) IV Intermittent every 8 hours  cholecalciferol 5000 Unit(s) Oral daily  fluticasone propionate/ salmeterol 250-50 MICROgram(s) Diskus 1 Dose(s) Inhalation two times a day  hydrochlorothiazide 12.5 milliGRAM(s) Oral daily  metroNIDAZOLE  IVPB 500 milliGRAM(s) IV Intermittent every 8 hours  montelukast 10 milliGRAM(s) Oral daily  multivitamin 1 Tablet(s) Oral daily  mupirocin 2% Ointment 1 Application(s) Topical two times a day  nebivolol 5 milliGRAM(s) Oral daily  sodium chloride 0.9%. 1000 milliLiter(s) (100 mL/Hr) IV Continuous <Continuous>    MEDICATIONS  (PRN):  acetaminophen     Tablet .. 650 milliGRAM(s) Oral every 6 hours PRN Temp greater or equal to 38C (100.4F), Mild Pain (1 - 3)  albuterol/ipratropium for Nebulization 3 milliLiter(s) Nebulizer every 6 hours PRN Shortness of Breath and/or Wheezing  bisacodyl 5 milliGRAM(s) Oral every 12 hours PRN Constipation  melatonin 3 milliGRAM(s) Oral at bedtime PRN Insomnia      EXAM:  Vital Signs Last 24 Hrs  T(C): 36.8 (24 Oct 2023 05:14), Max: 37.1 (23 Oct 2023 22:22)  T(F): 98.3 (24 Oct 2023 05:14), Max: 98.8 (23 Oct 2023 22:22)  HR: 80 (23 Oct 2023 22:22) (78 - 80)  BP: 123/81 (24 Oct 2023 05:14) (115/78 - 129/71)  BP(mean): --  RR: 19 (24 Oct 2023 05:14) (18 - 19)  SpO2: 99% (24 Oct 2023 05:14) (96% - 99%)    Parameters below as of 24 Oct 2023 05:14  Patient On (Oxygen Delivery Method): room air        GENERAL: The patient is awake and alert in no apparent distress.     LUNGS crackles left base                          11.8   9.36  )-----------( 495      ( 24 Oct 2023 06:20 )             34.3       10-24    131<L>  |  95<L>  |  9   ----------------------------<  93  4.0   |  24  |  0.50    Ca    9.1      24 Oct 2023 06:20  Phos  3.4     10-24  Mg     2.20     10-24    TPro  7.5  /  Alb  3.0<L>  /  TBili  0.7  /  DBili  0.3  /  AST  48<H>  /  ALT  38<H>  /  AlkPhos  121<H>  10-24   < from: CT Chest No Cont (10.19.23 @ 17:53) >    ACC: [id number] EXAM:  CT CHEST   ORDERED BY: RYDER CHAMORRO     PROCEDURE DATE:  10/19/2023          INTERPRETATION:  CLINICAL INFORMATION: Left pleural effusion    COMPARISON: Chest CT 1/14/2015, chest x-ray 10/19/2023    CONTRAST/COMPLICATIONS:  IV Contrast: None  Oral Contrast: None  Complications: None reported    PROCEDURE:  CT scan of the chest was obtained without intravenous contrast.    FINDINGS:    Trachea and mainstem bronchi patent. Lingular and left basilar   parenchymal opacification. Moderate size loculated left pleural effusion   and associated mediastinal pleural thickening (for example image 59,   series 2).    Right lower lobe superior segment 2.5 x 2 cm nodular consolidative   opacity (image 79, series 2).    Few mildly enlarged mediastinal nodes; reference precarinal 1.9 x 1.2 cm   node (image 57, series 2). Left internal mammary node measures 0.7 cm   (image 65, series 2).    Heart size normal. No pericardial effusion. Coronary calcifications.   Normal caliber thoracic aorta.    Cholecystectomy. Soft tissues and osseous structures unremarkable.      IMPRESSION:    Right lower lobe superior segment 2.5 cm nodular consolidative opacity;   differential diagnosis includes infection and primary lung malignancy.   Recommend CT chest follow-up in one month to help differentiate between   these two etiologies.    Moderate-sized loculated left pleural effusion with associated   mediastinal pleural thickening. The exact etiology is unclear, but   malignancy is a diagnostic consideration.    Lingular and left lower lobe parenchymal opacification may represent   atelectasis or pneumonia.    Mildly enlarged mediastinal nodes and left internal mammary node are   indeterminate and recommend attention on CT chest follow-up.    --- End of Report ---          JIMI WEAVER MD; Resident Radiologist  This document has been electronically signed.  JOSELYN SALTER MD; Attending Radiologist  This document has been electronically signed. Oct 19 2023  6:19PM    < end of copied text >      PROBLEM LIST:  53y Female with HEALTH ISSUES - PROBLEM Dx:  LLL pneumonia    Hypoalbuminemia    Sepsis due to undetermined organism    Prophylactic measure    ECG abnormality    Dehydration with hyponatremia    Loculated pleural effusion    Elevated bilirubin    SVT (supraventricular tachycardia)    Empyema lung              RECS:  exudative fluid  f/u cultures  abx  use IS  appreciate CTS/IR/ID      Please call with any questions.    Elizabeth Rees DO  [facility] Pulmonary/Sleep Medicine  [phone]

## 2023-10-25 ENCOUNTER — APPOINTMENT (OUTPATIENT)
Dept: CARDIOLOGY | Facility: CLINIC | Age: 53
End: 2023-10-25

## 2023-10-25 LAB
ANION GAP SERPL CALC-SCNC: 11 MMOL/L — SIGNIFICANT CHANGE UP (ref 7–14)
ANION GAP SERPL CALC-SCNC: 11 MMOL/L — SIGNIFICANT CHANGE UP (ref 7–14)
BUN SERPL-MCNC: 9 MG/DL — SIGNIFICANT CHANGE UP (ref 7–23)
BUN SERPL-MCNC: 9 MG/DL — SIGNIFICANT CHANGE UP (ref 7–23)
CALCIUM SERPL-MCNC: 9.5 MG/DL — SIGNIFICANT CHANGE UP (ref 8.4–10.5)
CALCIUM SERPL-MCNC: 9.5 MG/DL — SIGNIFICANT CHANGE UP (ref 8.4–10.5)
CHLORIDE SERPL-SCNC: 99 MMOL/L — SIGNIFICANT CHANGE UP (ref 98–107)
CHLORIDE SERPL-SCNC: 99 MMOL/L — SIGNIFICANT CHANGE UP (ref 98–107)
CO2 SERPL-SCNC: 26 MMOL/L — SIGNIFICANT CHANGE UP (ref 22–31)
CO2 SERPL-SCNC: 26 MMOL/L — SIGNIFICANT CHANGE UP (ref 22–31)
CREAT SERPL-MCNC: 0.49 MG/DL — LOW (ref 0.5–1.3)
CREAT SERPL-MCNC: 0.49 MG/DL — LOW (ref 0.5–1.3)
CULTURE RESULTS: SIGNIFICANT CHANGE UP
EGFR: 113 ML/MIN/1.73M2 — SIGNIFICANT CHANGE UP
EGFR: 113 ML/MIN/1.73M2 — SIGNIFICANT CHANGE UP
GLUCOSE SERPL-MCNC: 103 MG/DL — HIGH (ref 70–99)
GLUCOSE SERPL-MCNC: 103 MG/DL — HIGH (ref 70–99)
GRAM STN FLD: ABNORMAL
GRAM STN FLD: ABNORMAL
HCT VFR BLD CALC: 35.5 % — SIGNIFICANT CHANGE UP (ref 34.5–45)
HCT VFR BLD CALC: 35.5 % — SIGNIFICANT CHANGE UP (ref 34.5–45)
HGB BLD-MCNC: 12 G/DL — SIGNIFICANT CHANGE UP (ref 11.5–15.5)
HGB BLD-MCNC: 12 G/DL — SIGNIFICANT CHANGE UP (ref 11.5–15.5)
MAGNESIUM SERPL-MCNC: 2.1 MG/DL — SIGNIFICANT CHANGE UP (ref 1.6–2.6)
MAGNESIUM SERPL-MCNC: 2.1 MG/DL — SIGNIFICANT CHANGE UP (ref 1.6–2.6)
MCHC RBC-ENTMCNC: 31.6 PG — SIGNIFICANT CHANGE UP (ref 27–34)
MCHC RBC-ENTMCNC: 31.6 PG — SIGNIFICANT CHANGE UP (ref 27–34)
MCHC RBC-ENTMCNC: 33.8 GM/DL — SIGNIFICANT CHANGE UP (ref 32–36)
MCHC RBC-ENTMCNC: 33.8 GM/DL — SIGNIFICANT CHANGE UP (ref 32–36)
MCV RBC AUTO: 93.4 FL — SIGNIFICANT CHANGE UP (ref 80–100)
MCV RBC AUTO: 93.4 FL — SIGNIFICANT CHANGE UP (ref 80–100)
NON-GYNECOLOGICAL CYTOLOGY STUDY: SIGNIFICANT CHANGE UP
NON-GYNECOLOGICAL CYTOLOGY STUDY: SIGNIFICANT CHANGE UP
NRBC # BLD: 0 /100 WBCS — SIGNIFICANT CHANGE UP (ref 0–0)
NRBC # BLD: 0 /100 WBCS — SIGNIFICANT CHANGE UP (ref 0–0)
NRBC # FLD: 0 K/UL — SIGNIFICANT CHANGE UP (ref 0–0)
NRBC # FLD: 0 K/UL — SIGNIFICANT CHANGE UP (ref 0–0)
PHOSPHATE SERPL-MCNC: 3.1 MG/DL — SIGNIFICANT CHANGE UP (ref 2.5–4.5)
PHOSPHATE SERPL-MCNC: 3.1 MG/DL — SIGNIFICANT CHANGE UP (ref 2.5–4.5)
PLATELET # BLD AUTO: 547 K/UL — HIGH (ref 150–400)
PLATELET # BLD AUTO: 547 K/UL — HIGH (ref 150–400)
POTASSIUM SERPL-MCNC: 4.1 MMOL/L — SIGNIFICANT CHANGE UP (ref 3.5–5.3)
POTASSIUM SERPL-MCNC: 4.1 MMOL/L — SIGNIFICANT CHANGE UP (ref 3.5–5.3)
POTASSIUM SERPL-SCNC: 4.1 MMOL/L — SIGNIFICANT CHANGE UP (ref 3.5–5.3)
POTASSIUM SERPL-SCNC: 4.1 MMOL/L — SIGNIFICANT CHANGE UP (ref 3.5–5.3)
RBC # BLD: 3.8 M/UL — SIGNIFICANT CHANGE UP (ref 3.8–5.2)
RBC # BLD: 3.8 M/UL — SIGNIFICANT CHANGE UP (ref 3.8–5.2)
RBC # FLD: 11.9 % — SIGNIFICANT CHANGE UP (ref 10.3–14.5)
RBC # FLD: 11.9 % — SIGNIFICANT CHANGE UP (ref 10.3–14.5)
SODIUM SERPL-SCNC: 136 MMOL/L — SIGNIFICANT CHANGE UP (ref 135–145)
SODIUM SERPL-SCNC: 136 MMOL/L — SIGNIFICANT CHANGE UP (ref 135–145)
SPECIMEN SOURCE: SIGNIFICANT CHANGE UP
WBC # BLD: 9.7 K/UL — SIGNIFICANT CHANGE UP (ref 3.8–10.5)
WBC # BLD: 9.7 K/UL — SIGNIFICANT CHANGE UP (ref 3.8–10.5)
WBC # FLD AUTO: 9.7 K/UL — SIGNIFICANT CHANGE UP (ref 3.8–10.5)
WBC # FLD AUTO: 9.7 K/UL — SIGNIFICANT CHANGE UP (ref 3.8–10.5)

## 2023-10-25 PROCEDURE — 32562 LYSE CHEST FIBRIN SUBQ DAY: CPT

## 2023-10-25 PROCEDURE — 99233 SBSQ HOSP IP/OBS HIGH 50: CPT

## 2023-10-25 PROCEDURE — 71045 X-RAY EXAM CHEST 1 VIEW: CPT | Mod: 26

## 2023-10-25 PROCEDURE — 99233 SBSQ HOSP IP/OBS HIGH 50: CPT | Mod: 57

## 2023-10-25 RX ORDER — SODIUM CHLORIDE 9 MG/ML
30 INJECTION INTRAMUSCULAR; INTRAVENOUS; SUBCUTANEOUS ONCE
Refills: 0 | Status: DISCONTINUED | OUTPATIENT
Start: 2023-10-25 | End: 2023-10-27

## 2023-10-25 RX ORDER — ALTEPLASE 100 MG
10 KIT INTRAVENOUS ONCE
Refills: 0 | Status: COMPLETED | OUTPATIENT
Start: 2023-10-25 | End: 2023-10-25

## 2023-10-25 RX ORDER — DORNASE ALFA 1 MG/ML
5 SOLUTION RESPIRATORY (INHALATION) ONCE
Refills: 0 | Status: COMPLETED | OUTPATIENT
Start: 2023-10-25 | End: 2023-10-25

## 2023-10-25 RX ADMIN — MUPIROCIN 1 APPLICATION(S): 20 OINTMENT TOPICAL at 06:44

## 2023-10-25 RX ADMIN — Medication 1 TABLET(S): at 12:17

## 2023-10-25 RX ADMIN — Medication 650 MILLIGRAM(S): at 05:56

## 2023-10-25 RX ADMIN — Medication 650 MILLIGRAM(S): at 21:08

## 2023-10-25 RX ADMIN — MONTELUKAST 10 MILLIGRAM(S): 4 TABLET, CHEWABLE ORAL at 12:17

## 2023-10-25 RX ADMIN — Medication 650 MILLIGRAM(S): at 06:26

## 2023-10-25 RX ADMIN — CEFEPIME 100 MILLIGRAM(S): 1 INJECTION, POWDER, FOR SOLUTION INTRAMUSCULAR; INTRAVENOUS at 12:18

## 2023-10-25 RX ADMIN — MUPIROCIN 1 APPLICATION(S): 20 OINTMENT TOPICAL at 18:22

## 2023-10-25 RX ADMIN — Medication 100 MILLIGRAM(S): at 23:17

## 2023-10-25 RX ADMIN — Medication 650 MILLIGRAM(S): at 21:38

## 2023-10-25 RX ADMIN — Medication 100 MILLIGRAM(S): at 13:28

## 2023-10-25 RX ADMIN — CEFEPIME 100 MILLIGRAM(S): 1 INJECTION, POWDER, FOR SOLUTION INTRAMUSCULAR; INTRAVENOUS at 06:45

## 2023-10-25 RX ADMIN — Medication 100 MILLIGRAM(S): at 07:58

## 2023-10-25 RX ADMIN — ALTEPLASE 10 MILLIGRAM(S): KIT at 12:53

## 2023-10-25 RX ADMIN — Medication 650 MILLIGRAM(S): at 14:10

## 2023-10-25 RX ADMIN — Medication 5000 UNIT(S): at 12:17

## 2023-10-25 RX ADMIN — FLUTICASONE PROPIONATE AND SALMETEROL 1 DOSE(S): 50; 250 POWDER ORAL; RESPIRATORY (INHALATION) at 12:15

## 2023-10-25 RX ADMIN — DORNASE ALFA 5 MILLIGRAM(S): 1 SOLUTION RESPIRATORY (INHALATION) at 12:54

## 2023-10-25 RX ADMIN — NEBIVOLOL HYDROCHLORIDE 5 MILLIGRAM(S): 5 TABLET ORAL at 06:44

## 2023-10-25 RX ADMIN — Medication 650 MILLIGRAM(S): at 13:23

## 2023-10-25 RX ADMIN — FLUTICASONE PROPIONATE AND SALMETEROL 1 DOSE(S): 50; 250 POWDER ORAL; RESPIRATORY (INHALATION) at 21:10

## 2023-10-25 RX ADMIN — CEFEPIME 100 MILLIGRAM(S): 1 INJECTION, POWDER, FOR SOLUTION INTRAMUSCULAR; INTRAVENOUS at 21:09

## 2023-10-25 NOTE — CHART NOTE - NSCHARTNOTEFT_GEN_A_CORE
Under aseptic technique, pt given Alteplase 10mg with Dornase 5gm via left pigtail catheter for purpose of Fibrinolysis. Sterile NS flush instilled before and after. Pt tolerated procedure well. PTC left clamped x 1hr then unclamped after that and placed back to strict suction. Will cont to monitor output.

## 2023-10-25 NOTE — PROGRESS NOTE ADULT - SUBJECTIVE AND OBJECTIVE BOX
[facility] Division of Hospital Medicine  Rene FARR NetoDerik) MD Moe  Pager [phone]    SUBJECTIVE:  Chief complaint: Empyema.    Pt seen and evaluated at bedside this AM. no o/n events. Feels well. Pain much better as well.      ROS: All systems negative except as noted.      Vital Signs Last 24 Hrs  T(C): 36.7 (25 Oct 2023 11:43), Max: 36.9 (24 Oct 2023 21:57)  T(F): 98.1 (25 Oct 2023 11:43), Max: 98.4 (24 Oct 2023 21:57)  HR: 74 (25 Oct 2023 11:43) (74 - 89)  BP: 133/77 (25 Oct 2023 11:43) (116/67 - 139/86)  BP(mean): --  RR: 18 (25 Oct 2023 11:43) (18 - 18)  SpO2: 95% (25 Oct 2023 11:43) (93% - 96%)    Parameters below as of 25 Oct 2023 11:43  Patient On (Oxygen Delivery Method): room air      PHYSICAL EXAM:  Gen- In chair, well-appearing, NAD. Speaks full sentences  Resp- CTAB, improved air entry at left base. no r/r/w.  CVS- RRR, S1S2, no g/r/m.  GI- Soft abd, NT, ND, +BSx4  Ext- No C/C.       MEDICATION:  MEDICATIONS  (STANDING):  alteplase  Injectable for Pleural Effusion 10 milliGRAM(s) IntraPleural. once  cefepime   IVPB 2000 milliGRAM(s) IV Intermittent every 8 hours  cholecalciferol 5000 Unit(s) Oral daily  dornase andrea Solution for Pleural Effusion 5 milliGRAM(s) IntraPleural. once  fluticasone propionate/ salmeterol 250-50 MICROgram(s) Diskus 1 Dose(s) Inhalation two times a day  hydrochlorothiazide 12.5 milliGRAM(s) Oral daily  metroNIDAZOLE  IVPB 500 milliGRAM(s) IV Intermittent every 8 hours  montelukast 10 milliGRAM(s) Oral daily  multivitamin 1 Tablet(s) Oral daily  mupirocin 2% Ointment 1 Application(s) Topical two times a day  nebivolol 5 milliGRAM(s) Oral daily  sodium chloride 0.9% Solution for Pleural Effusion 20 milliLiter(s) IntraPleural. once  sodium chloride 0.9% Solution for Pleural Effusion 30 milliLiter(s) IntraPleural. once  sodium chloride 0.9%. 1000 milliLiter(s) (100 mL/Hr) IV Continuous <Continuous>    MEDICATIONS  (PRN):  acetaminophen     Tablet .. 650 milliGRAM(s) Oral every 6 hours PRN Temp greater or equal to 38C (100.4F), Mild Pain (1 - 3)  albuterol/ipratropium for Nebulization 3 milliLiter(s) Nebulizer every 6 hours PRN Shortness of Breath and/or Wheezing  bisacodyl 5 milliGRAM(s) Oral every 12 hours PRN Constipation  melatonin 3 milliGRAM(s) Oral at bedtime PRN Insomnia            LABORATORY:                          12.0   9.70  )-----------( 547      ( 25 Oct 2023 07:00 )             35.5     10-25    136  |  99  |  9   ----------------------------<  103<H>  4.1   |  26  |  0.49<L>    Ca    9.5      25 Oct 2023 07:00  Phos  3.1     10-25  Mg     2.10     10-25    TPro  7.5  /  Alb  3.0<L>  /  TBili  0.7  /  DBili  0.3  /  AST  48<H>  /  ALT  38<H>  /  AlkPhos  121<H>  10-24      Urinalysis Basic - ( 25 Oct 2023 07:00 )    Color: x / Appearance: x / SG: x / pH: x  Gluc: 103 mg/dL / Ketone: x  / Bili: x / Urobili: x   Blood: x / Protein: x / Nitrite: x   Leuk Esterase: x / RBC: x / WBC x   Sq Epi: x / Non Sq Epi: x / Bacteria: x            SARS-CoV-2: NotDetec (19 Oct 2023 16:41)

## 2023-10-25 NOTE — PROGRESS NOTE ADULT - SUBJECTIVE AND OBJECTIVE BOX
Pt seen with DR Britt.  Doing better, feels well. Less pleuritic pain.   C/o pain at PTC site.   No fevers, no leukocytosis.     Vital Signs Last 24 Hrs  T(C): 36.7 (25 Oct 2023 11:43), Max: 36.9 (24 Oct 2023 21:57)  T(F): 98.1 (25 Oct 2023 11:43), Max: 98.4 (24 Oct 2023 21:57)  HR: 74 (25 Oct 2023 11:43) (74 - 89)  BP: 133/77 (25 Oct 2023 11:43) (116/67 - 139/86)  BP(mean): --  RR: 18 (25 Oct 2023 11:43) (18 - 18)  SpO2: 95% (25 Oct 2023 11:43) (93% - 96%)    Parameters below as of 25 Oct 2023 11:43  Patient On (Oxygen Delivery Method): room air      A+O x 3  Dec'd BS to left base  Left PTC site c/d/i  Left PTC- 145cc purulent fluid, to pleuravac on suction  No air leak noted.   Cytopathology pending  Cultures NGTD  CXR with continued left LL opacification from PNA but improved  left pleural effusion.                           12.0   9.70  )-----------( 547      ( 25 Oct 2023 07:00 )             35.5   10-25    136  |  99  |  9   ----------------------------<  103<H>  4.1   |  26  |  0.49<L>    Ca    9.5      25 Oct 2023 07:00  Phos  3.1     10-25  Mg     2.10     10-25    TPro  7.5  /  Alb  3.0<L>  /  TBili  0.7  /  DBili  0.3  /  AST  48<H>  /  ALT  38<H>  /  AlkPhos  121<H>  10-24      A/P: 52yo F with c/o fever, pleuritic chest pain found to have PNA and pleural effusion/empyema.   s/p IR placement of left PTC on 10/23. Pt s/p MIST procedure x 1 on 10/24 with improvement in CXR.     -Continued PTC to suction  -Will do MIST x 1 again today  -CXR in am  -F/u cultures and cytology  -No indication for Thoracic surgical procedure at this time, pt is improving.   -Pt will need repeat CT scan of chest as outpatient in about 4wks  Above d/w primary team  Will cont to follow.

## 2023-10-26 ENCOUNTER — TRANSCRIPTION ENCOUNTER (OUTPATIENT)
Age: 53
End: 2023-10-26

## 2023-10-26 LAB
-  CEFTRIAXONE: SIGNIFICANT CHANGE UP
-  CEFTRIAXONE: SIGNIFICANT CHANGE UP
-  PENICILLIN: SIGNIFICANT CHANGE UP
-  PENICILLIN: SIGNIFICANT CHANGE UP
-  VANCOMYCIN: SIGNIFICANT CHANGE UP
-  VANCOMYCIN: SIGNIFICANT CHANGE UP
ANION GAP SERPL CALC-SCNC: 10 MMOL/L — SIGNIFICANT CHANGE UP (ref 7–14)
ANION GAP SERPL CALC-SCNC: 10 MMOL/L — SIGNIFICANT CHANGE UP (ref 7–14)
BASOPHILS # BLD AUTO: 0.07 K/UL — SIGNIFICANT CHANGE UP (ref 0–0.2)
BASOPHILS # BLD AUTO: 0.07 K/UL — SIGNIFICANT CHANGE UP (ref 0–0.2)
BASOPHILS NFR BLD AUTO: 0.7 % — SIGNIFICANT CHANGE UP (ref 0–2)
BASOPHILS NFR BLD AUTO: 0.7 % — SIGNIFICANT CHANGE UP (ref 0–2)
BUN SERPL-MCNC: 8 MG/DL — SIGNIFICANT CHANGE UP (ref 7–23)
BUN SERPL-MCNC: 8 MG/DL — SIGNIFICANT CHANGE UP (ref 7–23)
CALCIUM SERPL-MCNC: 9.3 MG/DL — SIGNIFICANT CHANGE UP (ref 8.4–10.5)
CALCIUM SERPL-MCNC: 9.3 MG/DL — SIGNIFICANT CHANGE UP (ref 8.4–10.5)
CHLORIDE SERPL-SCNC: 99 MMOL/L — SIGNIFICANT CHANGE UP (ref 98–107)
CHLORIDE SERPL-SCNC: 99 MMOL/L — SIGNIFICANT CHANGE UP (ref 98–107)
CO2 SERPL-SCNC: 26 MMOL/L — SIGNIFICANT CHANGE UP (ref 22–31)
CO2 SERPL-SCNC: 26 MMOL/L — SIGNIFICANT CHANGE UP (ref 22–31)
CREAT SERPL-MCNC: 0.49 MG/DL — LOW (ref 0.5–1.3)
CREAT SERPL-MCNC: 0.49 MG/DL — LOW (ref 0.5–1.3)
CULTURE RESULTS: SIGNIFICANT CHANGE UP
EGFR: 113 ML/MIN/1.73M2 — SIGNIFICANT CHANGE UP
EGFR: 113 ML/MIN/1.73M2 — SIGNIFICANT CHANGE UP
EOSINOPHIL # BLD AUTO: 0.32 K/UL — SIGNIFICANT CHANGE UP (ref 0–0.5)
EOSINOPHIL # BLD AUTO: 0.32 K/UL — SIGNIFICANT CHANGE UP (ref 0–0.5)
EOSINOPHIL NFR BLD AUTO: 3.3 % — SIGNIFICANT CHANGE UP (ref 0–6)
EOSINOPHIL NFR BLD AUTO: 3.3 % — SIGNIFICANT CHANGE UP (ref 0–6)
GLUCOSE SERPL-MCNC: 111 MG/DL — HIGH (ref 70–99)
GLUCOSE SERPL-MCNC: 111 MG/DL — HIGH (ref 70–99)
HCT VFR BLD CALC: 36.5 % — SIGNIFICANT CHANGE UP (ref 34.5–45)
HCT VFR BLD CALC: 36.5 % — SIGNIFICANT CHANGE UP (ref 34.5–45)
HGB BLD-MCNC: 11.7 G/DL — SIGNIFICANT CHANGE UP (ref 11.5–15.5)
HGB BLD-MCNC: 11.7 G/DL — SIGNIFICANT CHANGE UP (ref 11.5–15.5)
IANC: 7.06 K/UL — SIGNIFICANT CHANGE UP (ref 1.8–7.4)
IANC: 7.06 K/UL — SIGNIFICANT CHANGE UP (ref 1.8–7.4)
IMM GRANULOCYTES NFR BLD AUTO: 2.3 % — HIGH (ref 0–0.9)
IMM GRANULOCYTES NFR BLD AUTO: 2.3 % — HIGH (ref 0–0.9)
LYMPHOCYTES # BLD AUTO: 1.18 K/UL — SIGNIFICANT CHANGE UP (ref 1–3.3)
LYMPHOCYTES # BLD AUTO: 1.18 K/UL — SIGNIFICANT CHANGE UP (ref 1–3.3)
LYMPHOCYTES # BLD AUTO: 12.3 % — LOW (ref 13–44)
LYMPHOCYTES # BLD AUTO: 12.3 % — LOW (ref 13–44)
MAGNESIUM SERPL-MCNC: 2 MG/DL — SIGNIFICANT CHANGE UP (ref 1.6–2.6)
MAGNESIUM SERPL-MCNC: 2 MG/DL — SIGNIFICANT CHANGE UP (ref 1.6–2.6)
MCHC RBC-ENTMCNC: 30.6 PG — SIGNIFICANT CHANGE UP (ref 27–34)
MCHC RBC-ENTMCNC: 30.6 PG — SIGNIFICANT CHANGE UP (ref 27–34)
MCHC RBC-ENTMCNC: 32.1 GM/DL — SIGNIFICANT CHANGE UP (ref 32–36)
MCHC RBC-ENTMCNC: 32.1 GM/DL — SIGNIFICANT CHANGE UP (ref 32–36)
MCV RBC AUTO: 95.5 FL — SIGNIFICANT CHANGE UP (ref 80–100)
MCV RBC AUTO: 95.5 FL — SIGNIFICANT CHANGE UP (ref 80–100)
METHOD TYPE: SIGNIFICANT CHANGE UP
METHOD TYPE: SIGNIFICANT CHANGE UP
MONOCYTES # BLD AUTO: 0.75 K/UL — SIGNIFICANT CHANGE UP (ref 0–0.9)
MONOCYTES # BLD AUTO: 0.75 K/UL — SIGNIFICANT CHANGE UP (ref 0–0.9)
MONOCYTES NFR BLD AUTO: 7.8 % — SIGNIFICANT CHANGE UP (ref 2–14)
MONOCYTES NFR BLD AUTO: 7.8 % — SIGNIFICANT CHANGE UP (ref 2–14)
NEUTROPHILS # BLD AUTO: 7.06 K/UL — SIGNIFICANT CHANGE UP (ref 1.8–7.4)
NEUTROPHILS # BLD AUTO: 7.06 K/UL — SIGNIFICANT CHANGE UP (ref 1.8–7.4)
NEUTROPHILS NFR BLD AUTO: 73.6 % — SIGNIFICANT CHANGE UP (ref 43–77)
NEUTROPHILS NFR BLD AUTO: 73.6 % — SIGNIFICANT CHANGE UP (ref 43–77)
NRBC # BLD: 0 /100 WBCS — SIGNIFICANT CHANGE UP (ref 0–0)
NRBC # BLD: 0 /100 WBCS — SIGNIFICANT CHANGE UP (ref 0–0)
NRBC # FLD: 0 K/UL — SIGNIFICANT CHANGE UP (ref 0–0)
NRBC # FLD: 0 K/UL — SIGNIFICANT CHANGE UP (ref 0–0)
PHOSPHATE SERPL-MCNC: 3 MG/DL — SIGNIFICANT CHANGE UP (ref 2.5–4.5)
PHOSPHATE SERPL-MCNC: 3 MG/DL — SIGNIFICANT CHANGE UP (ref 2.5–4.5)
PLATELET # BLD AUTO: 535 K/UL — HIGH (ref 150–400)
PLATELET # BLD AUTO: 535 K/UL — HIGH (ref 150–400)
POTASSIUM SERPL-MCNC: 3.8 MMOL/L — SIGNIFICANT CHANGE UP (ref 3.5–5.3)
POTASSIUM SERPL-MCNC: 3.8 MMOL/L — SIGNIFICANT CHANGE UP (ref 3.5–5.3)
POTASSIUM SERPL-SCNC: 3.8 MMOL/L — SIGNIFICANT CHANGE UP (ref 3.5–5.3)
POTASSIUM SERPL-SCNC: 3.8 MMOL/L — SIGNIFICANT CHANGE UP (ref 3.5–5.3)
RBC # BLD: 3.82 M/UL — SIGNIFICANT CHANGE UP (ref 3.8–5.2)
RBC # BLD: 3.82 M/UL — SIGNIFICANT CHANGE UP (ref 3.8–5.2)
RBC # FLD: 11.9 % — SIGNIFICANT CHANGE UP (ref 10.3–14.5)
RBC # FLD: 11.9 % — SIGNIFICANT CHANGE UP (ref 10.3–14.5)
SODIUM SERPL-SCNC: 135 MMOL/L — SIGNIFICANT CHANGE UP (ref 135–145)
SODIUM SERPL-SCNC: 135 MMOL/L — SIGNIFICANT CHANGE UP (ref 135–145)
SPECIMEN SOURCE: SIGNIFICANT CHANGE UP
WBC # BLD: 9.6 K/UL — SIGNIFICANT CHANGE UP (ref 3.8–10.5)
WBC # BLD: 9.6 K/UL — SIGNIFICANT CHANGE UP (ref 3.8–10.5)
WBC # FLD AUTO: 9.6 K/UL — SIGNIFICANT CHANGE UP (ref 3.8–10.5)
WBC # FLD AUTO: 9.6 K/UL — SIGNIFICANT CHANGE UP (ref 3.8–10.5)

## 2023-10-26 PROCEDURE — 71045 X-RAY EXAM CHEST 1 VIEW: CPT | Mod: 26

## 2023-10-26 PROCEDURE — 99233 SBSQ HOSP IP/OBS HIGH 50: CPT | Mod: 57

## 2023-10-26 PROCEDURE — 99232 SBSQ HOSP IP/OBS MODERATE 35: CPT

## 2023-10-26 RX ORDER — METRONIDAZOLE 500 MG
1 TABLET ORAL
Qty: 126 | Refills: 0
Start: 2023-10-26 | End: 2023-12-06

## 2023-10-26 RX ORDER — CEFUROXIME AXETIL 250 MG
1 TABLET ORAL
Qty: 84 | Refills: 0
Start: 2023-10-26 | End: 2023-12-06

## 2023-10-26 RX ADMIN — Medication 650 MILLIGRAM(S): at 06:20

## 2023-10-26 RX ADMIN — CEFEPIME 100 MILLIGRAM(S): 1 INJECTION, POWDER, FOR SOLUTION INTRAMUSCULAR; INTRAVENOUS at 05:42

## 2023-10-26 RX ADMIN — FLUTICASONE PROPIONATE AND SALMETEROL 1 DOSE(S): 50; 250 POWDER ORAL; RESPIRATORY (INHALATION) at 23:24

## 2023-10-26 RX ADMIN — NEBIVOLOL HYDROCHLORIDE 5 MILLIGRAM(S): 5 TABLET ORAL at 05:41

## 2023-10-26 RX ADMIN — Medication 650 MILLIGRAM(S): at 05:39

## 2023-10-26 RX ADMIN — CEFEPIME 100 MILLIGRAM(S): 1 INJECTION, POWDER, FOR SOLUTION INTRAMUSCULAR; INTRAVENOUS at 22:25

## 2023-10-26 RX ADMIN — MUPIROCIN 1 APPLICATION(S): 20 OINTMENT TOPICAL at 17:15

## 2023-10-26 RX ADMIN — MUPIROCIN 1 APPLICATION(S): 20 OINTMENT TOPICAL at 05:42

## 2023-10-26 RX ADMIN — FLUTICASONE PROPIONATE AND SALMETEROL 1 DOSE(S): 50; 250 POWDER ORAL; RESPIRATORY (INHALATION) at 12:42

## 2023-10-26 RX ADMIN — Medication 650 MILLIGRAM(S): at 23:11

## 2023-10-26 RX ADMIN — Medication 100 MILLIGRAM(S): at 23:19

## 2023-10-26 RX ADMIN — Medication 100 MILLIGRAM(S): at 05:41

## 2023-10-26 RX ADMIN — Medication 100 MILLIGRAM(S): at 13:44

## 2023-10-26 RX ADMIN — Medication 650 MILLIGRAM(S): at 22:24

## 2023-10-26 RX ADMIN — CEFEPIME 100 MILLIGRAM(S): 1 INJECTION, POWDER, FOR SOLUTION INTRAMUSCULAR; INTRAVENOUS at 12:45

## 2023-10-26 RX ADMIN — Medication 5000 UNIT(S): at 12:43

## 2023-10-26 RX ADMIN — MONTELUKAST 10 MILLIGRAM(S): 4 TABLET, CHEWABLE ORAL at 12:43

## 2023-10-26 RX ADMIN — Medication 1 TABLET(S): at 12:42

## 2023-10-26 NOTE — PROGRESS NOTE ADULT - PROBLEM SELECTOR PLAN 2
- Findings on CT reviewed w/ pulmonology.  - Malignancy not entirely excluded. Main risk factor = 2nd hand smoke exposure. F/u fluid studies.  - Abx as above  - Will need repeat CT chest in a few weeks for interval check.
- Findings on CT reviewed w/ pulmonology.  - Malignancy not entirely excluded. Main risk factor = 2nd hand smoke exposure.  - Abx as above.  - IR c/s for thora.
- Findings on CT reviewed w/ pulmonology.  - Malignancy not entirely excluded. Main risk factor = 2nd hand smoke exposure.  - Abx as above.  - IR c/s for pigtail/thora
- Findings on CT reviewed w/ pulmonology.  - Malignancy not entirely excluded. Main risk factor = 2nd hand smoke exposure. F/u fluid studies.  - Abx as above  - Will need repeat CT chest in a few weeks for interval check.
- Findings on CT reviewed w/ pulmonology.  - Malignancy not entirely excluded. Main risk factor = 2nd hand smoke exposure.  - Abx as above.  - IR c/s for pigtail/thora - f/u Mon. Will need pre-procedure labs.
- Findings on CT reviewed w/ pulmonology.  - Malignancy not entirely excluded. Main risk factor = 2nd hand smoke exposure.  - Abx as above.  - IR c/s for pigtail/thora - f/u Mon
- Findings on CT reviewed w/ pulmonology.  - Malignancy not entirely excluded. Main risk factor = 2nd hand smoke exposure. F/u fluid studies.  - Abx as above

## 2023-10-26 NOTE — PROGRESS NOTE ADULT - NS ATTEND AMEND GEN_ALL_CORE FT
not significant response to tpa - may not have more fluid - as ct demonstrated small collection and majority of process within the parenchyma.   plan for removal of tube and cxr post pull. if unchanged okay from thoracic standpoint for discharge and can f/u in two weeks w/ cxr.
patient with tube in place., plan for tpa/dornase. on ct scan there was not a significant amount of fluid with majority of LLL was consolidated.   will repeat cxr in AM>
cxr w/ some improvement, but minimal output after first tpa dose. will plan for additional dose today. Patient afebrile with normal wbc. would defer decort at this time given majority of process is within the parenchyma and no  significant trapping of lung .
had pigtail placed by IR w/ CT guidance given small collection.   will f/u on cultures and cxr.

## 2023-10-26 NOTE — PROGRESS NOTE ADULT - SUBJECTIVE AND OBJECTIVE BOX
[facility]  Division of Infectious Diseases  [phone]    Name: AKILAH MARQUEZ  Age: 53y  Gender: Female  MRN: [id number]    Interval History--  Notes reviewed. Feels fine. Wants to go home.   Yesterday's CXR reviewed, this AM's pending  No fevers. Denies pain. No chills or rigors. Denies any SOB or cough.   HIV negative result d/w patient.     Past Medical History--  SVT (supraventricular tachycardia)    Asthma    Migraine    Pyogenic granuloma    Schamberg's disease    Cervical spine degeneration    HNP (herniated nucleus pulposus), cervical    HNP (herniated nucleus pulposus), thoracic    History of lumpectomy of left breast    History of cholecystectomy    Venous stasis ulcer of right lower extremity    Breast cyst, left        For details regarding the patient's social history, family history, and other miscellaneous elements, please refer the initial infectious diseases consultation and/or the admitting history and physical examination for this admission.    Allergies    levofloxacin (Rash)  penicillin (Rash)  clarithromycin (Rash)  doxycycline (Rash)    Intolerances        Medications--  Antibiotics:  cefepime   IVPB 2000 milliGRAM(s) IV Intermittent every 8 hours  metroNIDAZOLE  IVPB 500 milliGRAM(s) IV Intermittent every 8 hours    Immunologic:    Other:  acetaminophen     Tablet .. PRN  albuterol/ipratropium for Nebulization PRN  alteplase  Injectable for Pleural Effusion  bisacodyl PRN  cholecalciferol  dornase andrea Solution for Pleural Effusion  fluticasone propionate/ salmeterol 250-50 MICROgram(s) Diskus  hydrochlorothiazide  melatonin PRN  montelukast  multivitamin  mupirocin 2% Ointment  nebivolol  sodium chloride 0.9% Solution for Pleural Effusion  sodium chloride 0.9% Solution for Pleural Effusion  sodium chloride 0.9%.      Review of Systems--  A 10-point review of systems was obtained.   Review of systems otherwise negative except as previously noted.    Physical Examination--  Vital Signs: T(F): 98.9 (10-26-23 @ 05:03), Max: 98.9 (10-26-23 @ 05:03)  HR: 85 (10-26-23 @ 05:03)  BP: 134/81 (10-26-23 @ 05:03)  RR: 18 (10-26-23 @ 05:03)  SpO2: 97% (10-26-23 @ 05:03)  Wt(kg): --  General: Nontoxic-appearing Female in no acute distress.  HEENT: AT/NC. Anicteric. Conjunctiva pink and moist.  Neck: Not rigid. No sense of mass.  Nodes: None palpable.  Lungs: Diminished BS B, better air movement on L, CT in place  Heart: Regular rate and rhythm.   Abdomen: Bowel sounds present and normoactive. Soft. Nondistended. Nontender.  Extremities: No cyanosis or clubbing. 1+ LE edema.   Skin: Warm. Dry. Good turgor. stasis-like changes LE.  No vasculitic stigmata.  Psychiatric: Appropriate affect and mood for situation.       Laboratory Studies--  CBC                        11.7   9.60  )-----------( 535      ( 26 Oct 2023 06:22 )             36.5       Chemistries  10-26    135  |  99  |  8   ----------------------------<  111<H>  3.8   |  26  |  0.49<L>    Ca    9.3      26 Oct 2023 06:22  Phos  3.0     10-26  Mg     2.00     10-26        Culture Data    Culture - Acid Fast - Body Fluid w/Smear (collected 23 Oct 2023 15:00)  Source: .Body Fluid left pleural effusion  Preliminary Report (25 Oct 2023 15:07):    Culture is being performed.    Culture - Fungal, Body Fluid (collected 23 Oct 2023 15:00)  Source: .Body Fluid left pleural effusion  Preliminary Report (25 Oct 2023 15:02):    Culture is being performed. Fungal cultures are held for 4 weeks.    Culture - Body Fluid with Gram Stain (collected 23 Oct 2023 15:00)  Source: .Body Fluid left pleural effusion  Gram Stain (25 Oct 2023 17:52):    Numerous polymorphonuclear leukocytes seen per low power field    No organisms seen per oil power field  Preliminary Report (25 Oct 2023 17:52):    Few Streptococcus intermedius    Culture - Blood (collected 21 Oct 2023 06:56)  Source: .Blood Blood-Peripheral  Preliminary Report (25 Oct 2023 11:00):    No growth at 4 days    Culture - Blood (collected 21 Oct 2023 06:41)  Source: .Blood Blood-Peripheral  Preliminary Report (25 Oct 2023 11:00):    No growth at 4 days    Culture - Blood (collected 19 Oct 2023 20:55)  Source: .Blood Blood-Peripheral  Final Report (25 Oct 2023 01:01):    No growth at 5 days    Culture - Blood (collected 19 Oct 2023 15:50)  Source: .Blood Blood-Peripheral  Final Report (25 Oct 2023 01:01):    No growth at 5 days

## 2023-10-26 NOTE — DISCHARGE NOTE PROVIDER - NSDCCPCAREPLAN_GEN_ALL_CORE_FT
PRINCIPAL DISCHARGE DIAGNOSIS  Diagnosis: LLL pneumonia  Assessment and Plan of Treatment: You were treated for a pneumonia. You developed a collection of fluid that was drained. You will need a repeat CT scan in 4 weeks to monitor for improvement/resolution. You will need to follow up with pulmonology for that. Because of the extent of the infection - you will also require a prolonged course of antibiotics. Please take as prescribed. You will follow up with Dr. Brown to assess duration of therapy. We have also provided the contact info infectious disease specialist who saw you here in the hospitalist.     PRINCIPAL DISCHARGE DIAGNOSIS  Diagnosis: LLL pneumonia  Assessment and Plan of Treatment: You were treated for a pneumonia. You developed a collection of fluid that was drained. You will need a repeat CT scan in 4 weeks to monitor for improvement/resolution. You will need to follow up with pulmonology for that. Because of the extent of the infection - you will also require a prolonged course of antibiotics. Please take as prescribed. You will follow up with Dr. Brown to assess duration of therapy. We have also provided the contact info infectious disease specialist who saw you here in the hospitalist.      SECONDARY DISCHARGE DIAGNOSES  Diagnosis: SVT (supraventricular tachycardia)  Assessment and Plan of Treatment: Continue with your home medication.

## 2023-10-26 NOTE — PROGRESS NOTE ADULT - PROBLEM SELECTOR PLAN 7
- albumin = 2.8  - patient appears overweight  - UA showing proteinuria. Will monitor.
- albumin = 2.8  - patient appears overweight  - f/u UA

## 2023-10-26 NOTE — PROGRESS NOTE ADULT - PROBLEM SELECTOR PROBLEM 3
Loculated pleural effusion

## 2023-10-26 NOTE — PROGRESS NOTE ADULT - PROBLEM SELECTOR PLAN 6
- Transaminitis. RUQ sono uremarkable.  - likely sequela of s/e of sepsis  - Trend labs. Better.
- Transaminitis.  - likely sequela of s/e of sepsis  - Improving.  - f/u trend w/ repeat lab-work
- Transaminitis.  - likely sequela of s/e of sepsis  - Trend labs.  - Will check RUQ sono.
- Transaminitis. RUQ sono uremarkable.  - likely sequela of s/e of sepsis  - Trend labs. Better.
- Transaminitis. RUQ sono uremarkable.  - likely sequela of s/e of sepsis  - Trend labs. Better.

## 2023-10-26 NOTE — DIETITIAN INITIAL EVALUATION ADULT - NSICDXPASTSURGICALHX_GEN_ALL_CORE_FT
PAST SURGICAL HISTORY:  Breast cyst, left     History of cholecystectomy     History of lumpectomy of left breast     Venous stasis ulcer of right lower extremity

## 2023-10-26 NOTE — DISCHARGE NOTE PROVIDER - CARE PROVIDER_API CALL
Michelle Ugalde  Thoracic Surgery  [phone] [place] [address] [place] [address]  Phone: [phone]  Fax: [phone]  Follow Up Time: 1 month

## 2023-10-26 NOTE — PROGRESS NOTE ADULT - PROBLEM SELECTOR PLAN 5
-Resolved w/ fluids.  -Trend labs.    #Hypokalemia  -Better. Will Kcl today.  -Trend labs.
-Resolved w/ fluids.  -Trend labs.    #Hypokalemia  -Resolved.  -Trend labs.
-Resolved w/ fluids.  -Trend labs.    #Hypokalemia  -Resolved.  -Trend labs.
-Resolved w/ fluids.  -Trend labs.    #Hypokalemia  -Replete lytes.  -Trend labs.
-Resolved w/ fluids.  -Trend labs.    #Hypokalemia  -Resolved.  -Trend labs.

## 2023-10-26 NOTE — DIETITIAN INITIAL EVALUATION ADULT - PROBLEM SELECTOR PLAN 5
- dry oral mucosa, lab-work appears somewhat hemoconcentrated  - sodium = 131  - insensible losses from fever, cough  - s/p one liter NaCl in the ED; additional IVF of NaCl one liter, followed by maintenance IVF at 100 mL/Hr x 10 hours in progress

## 2023-10-26 NOTE — DIETITIAN INITIAL EVALUATION ADULT - PERTINENT LABORATORY DATA
10-26    135  |  99  |  8   ----------------------------<  111<H>  3.8   |  26  |  0.49<L>    Ca    9.3      26 Oct 2023 06:22  Phos  3.0     10-26  Mg     2.00     10-26    A1C with Estimated Average Glucose Result: 4.8 % (10-21-23 @ 06:41)

## 2023-10-26 NOTE — PROGRESS NOTE ADULT - PROBLEM SELECTOR PROBLEM 1
Sepsis due to undetermined organism

## 2023-10-26 NOTE — PROGRESS NOTE ADULT - SUBJECTIVE AND OBJECTIVE BOX
Subjective: patient seen and examined with thoracic surgery team  pt without acute complaints  pain controlled  no fevers  pt denies chest pain or shortness of breath  using incentive spirometer  on bowel regimen, +flatus, +BM  ambulatory with assistance  on cefepime and flagyl  CXR pending  d/w attending on morning TEAMS report      Vital Signs:  Vital Signs Last 24 Hrs  T(C): 37.2 (10-26-23 @ 05:03), Max: 37.2 (10-26-23 @ 05:03)  T(F): 98.9 (10-26-23 @ 05:03), Max: 98.9 (10-26-23 @ 05:03)  HR: 85 (10-26-23 @ 05:03) (74 - 85)  BP: 134/81 (10-26-23 @ 05:03) (126/82 - 134/81)  RR: 18 (10-26-23 @ 05:03) (18 - 19)  SpO2: 97% (10-26-23 @ 05:03) (95% - 97%) on (O2)    PE  General: awake and alert NAD  Neurology: A&Ox3, nonfocal, DOWELL x 4  Respiratory: CTA B/L  CV: RRR, S1S2, no murmurs, rubs or gallops  Abdominal: Soft, NT, ND +BS, Last BM  Extremities: No edema, + peripheral pulses  Incisions: c,d,i  Tubes: L PTC on suiction drained 100cc serosanguinous fluid, less purulent; no air leak appreciated  Relevant labs, radiology and Medications reviewed    Awaiting CXR from today                         11.7   9.60  )-----------( 535      ( 26 Oct 2023 06:22 )             36.5     10-26    135  |  99  |  8   ----------------------------<  111<H>  3.8   |  26  |  0.49<L>    Ca    9.3      26 Oct 2023 06:22  Phos  3.0     10-26  Mg     2.00     10-26        MEDICATIONS  (STANDING):  alteplase  Injectable for Pleural Effusion 10 milliGRAM(s) IntraPleural. once  cefepime   IVPB 2000 milliGRAM(s) IV Intermittent every 8 hours  cholecalciferol 5000 Unit(s) Oral daily  dornase andrea Solution for Pleural Effusion 5 milliGRAM(s) IntraPleural. once  fluticasone propionate/ salmeterol 250-50 MICROgram(s) Diskus 1 Dose(s) Inhalation two times a day  hydrochlorothiazide 12.5 milliGRAM(s) Oral daily  metroNIDAZOLE  IVPB 500 milliGRAM(s) IV Intermittent every 8 hours  montelukast 10 milliGRAM(s) Oral daily  multivitamin 1 Tablet(s) Oral daily  mupirocin 2% Ointment 1 Application(s) Topical two times a day  nebivolol 5 milliGRAM(s) Oral daily  sodium chloride 0.9% Solution for Pleural Effusion 20 milliLiter(s) IntraPleural. once  sodium chloride 0.9% Solution for Pleural Effusion 30 milliLiter(s) IntraPleural. once  sodium chloride 0.9%. 1000 milliLiter(s) (100 mL/Hr) IV Continuous <Continuous>    MEDICATIONS  (PRN):  acetaminophen     Tablet .. 650 milliGRAM(s) Oral every 6 hours PRN Temp greater or equal to 38C (100.4F), Mild Pain (1 - 3)  albuterol/ipratropium for Nebulization 3 milliLiter(s) Nebulizer every 6 hours PRN Shortness of Breath and/or Wheezing  bisacodyl 5 milliGRAM(s) Oral every 12 hours PRN Constipation  melatonin 3 milliGRAM(s) Oral at bedtime PRN Insomnia    Pertinent Physical Exam  I&O's Summary    25 Oct 2023 07:01  -  26 Oct 2023 07:00  --------------------------------------------------------  IN: 0 mL / OUT: 45 mL / NET: -45 mL         Social History:  · Substance use	No  · Social History (marital status, living situation, occupation, and sexual history)	SOCIAL HISTORY:    Marital Status:  (  )    (  ) Single        (  )        (  )        ( x )   Lives with:          (  ) Alone      (  ) Spouse     (  ) Children         (  ) Parents             (  ) Other    No personal history of smoking  History of second hand smoking exposure (parents, )  No history of alcohol abuse  No history of illegal drug use    Occupation:     Tobacco Screening:  · Core Measure Site	No    Patient reported she lives in a home with her father. She's her father's  primary caregiver.    Culture Results:   Few Streptococcus intermedius (10.23.23 @ 15:00)       Historical Values  Culture Results:   Culture is being performed. (10.23.23 @ 15:00)   Culture Results:   Culture is being performed. Fungal cultures are held for 4 weeks. (10.23.23 @ 15:00)   Culture Results:   Few Streptococcus intermedius (10.23.23 @ 15:00)   Culture Results:   No growth at 4 days (10.21.23 @ 06:56)   Culture Results:   No growth at 4 days (10.21.23 @ 06:41)   Culture Results:   No growth at 5 days (10.19.23 @ 20:55)   Culture Results:   No growth at 5 days (10.19.23 @ 15:50)       Assessment  52yo F with c/o fever, pleuritic chest pain found to have PNA and pleural effusion/empyema.   s/p IR placement of left PTC on 10/23. Pt s/p MIST procedure x 2 with improvement in CXR.   Todays, CXR pending - called radiology to expedite    -Continued PTC to suction  -daily CXR while PTC in placed  -F/u cultures and cytology  -pt continues on cefepime and flagyl  -No indication for Thoracic surgical procedure at this time, pt is improving.   -Pt will need repeat CT scan of chest as outpatient in about 4wks  Above d/w primary team  Will cont to follow.

## 2023-10-26 NOTE — DIETITIAN INITIAL EVALUATION ADULT - PROBLEM SELECTOR PLAN 1
- Elevated WBC, febrile, tachycardic  - lactate elevated at 2.5  - presented w/ severe left sided chest pain, non-productive cough, chills  - CXR demonstrative of pneumonia, pleural effusion, mildly enlarged mediastinal nodes and intermediate left internal mammary node  - started on ceftriaxone and metronidazole in the ED; continuing  - Tylenol PRN fever  - f/u blood cultures (in progress)  - ensure optimal hydration; s/o one liter NaCl in the ED.  Additional IVF prescribed  - ID consult in the AM

## 2023-10-26 NOTE — PROGRESS NOTE ADULT - PROBLEM SELECTOR PROBLEM 7
Hypoalbuminemia

## 2023-10-26 NOTE — DIETITIAN INITIAL EVALUATION ADULT - NSFNSGIIOFT_GEN_A_CORE
10-25-23 @ 07:01  -  10-26-23 @ 07:00  --------------------------------------------------------  OUT:    Chest Tube (mL): 45 mL  Total OUT: 45 mL    Total NET: -45 mL

## 2023-10-26 NOTE — PROGRESS NOTE ADULT - PROBLEM SELECTOR PLAN 1
Severe sepsis 2' suspected left sided pneumonia. R/o malignancy.  -Lactate normalized  -Still spiking. Clinically well-appearing otherwise.  -Legionella/strep PNA Ag neg. MRSA neg. MSSA swab pos. Follow up Cx.  -Off CTX.   -Cont cefepime 2 q8h, flagyl 500 IV q8h  -Tylenol PRN fever - trend temp curve.  -Pulm/CTS recs appreciated. Appreciate ID recs.  -IR today - will f/u post procedure.
Severe sepsis 2' suspected left sided pneumonia. R/o malignancy.  -Cont CTX/Flagyl for now.  -Tylenol PRN fever  -Follow up Cx.  -Lactate normalized. Trend temp curve/labs.
Severe sepsis 2' suspected left sided pneumonia. R/o malignancy.  -Lactate normalized  -Still spiking. Clinically well-appearing otherwise.  -Legionella/strep PNA Ag neg. MRSA neg. MSSA swab pos. Follow up Cx.  -DC CTX/Flagyl. Start cefepime 2 q8h. Monitor curve. Consider ID c/s if not improving.  -Tylenol PRN fever  -Pulm/CTS recs appreciated. Await IR f/u for thora/pigtail.
Severe sepsis 2' suspected left sided pneumonia w/ empyema  -Lactate normalized. Afeb in past 48h. Symptomatically improved. Sepsis resolved.  -Legionella/strep PNA Ag neg. MRSA neg. MSSA swab pos. Follow up Cx.  -Cont cefepime 2 q8h, flagyl 500 IV q8h  -Tylenol PRN fever - trend temp curve.  -Pulm/CTS recs appreciated. Appreciate ID recs. Spoke w/ thoracic team.  -S/p PTC. S/p MIST x2. F/u cultures/cytology.  -CXR in AM.
Severe sepsis 2' suspected left sided pneumonia w/ empyema  -Lactate normalized. Afeb in past 48h. Symptomatically improved. Sepsis resolved.  -Legionella/strep PNA Ag neg. MRSA neg. MSSA swab pos.   -Cx growing staph intermedius.  -Tylenol PRN fever - trend temp curve.  -Pulm/CTS recs appreciated. Appreciate ID recs. Spoke w/ Dr Ugalde  -S/p MIST x2.  -F/u CXR once PTC removed.  -Repeat CT chest in 4wks to eval for improvement.  -Cont cefepime 2 q8h, flagyl 500 IV q8h - transition to PO on DC for tx 4-6wks. Appreciate ID recs.
Severe sepsis 2' suspected left sided pneumonia. R/o malignancy.  -Lactate normalized  -Still spiking. Clinically well-appearing otherwise.  -Legionella/strep PNA Ag neg. MRSA neg. MSSA swab pos. Follow up Cx.  -DC CTX. Cont cefepime 2 q8h. Restart flagyl 500 IV q8h  -Tylenol PRN fever - trend temp curve.  -Pulm/CTS recs appreciated. Appreciate ID recs.  -Await IR f/u for thora/pigtail - NPO @ MN. HOlding HSQ.
Severe sepsis 2' suspected left sided pneumonia w/ empyema  -Lactate normalized  -Afeb in past 24h. Symptomatically improved.  -Legionella/strep PNA Ag neg. MRSA neg. MSSA swab pos. Follow up Cx.  -Off CTX.   -Cont cefepime 2 q8h, flagyl 500 IV q8h  -Tylenol PRN fever - trend temp curve.  -Pulm/CTS recs appreciated. Appreciate ID recs.  -S/p PTC. S/p MIST x1. F/u fluid studies.

## 2023-10-26 NOTE — PROGRESS NOTE ADULT - PROBLEM SELECTOR PLAN 3
-W/u and tx infection as above.  -Resp status stable.
-Tx infection as above.  -Resp status stable.  -Will need thoracentesis for further evaluation. IR consulted as discussed w/ pulmonology.

## 2023-10-26 NOTE — DIETITIAN INITIAL EVALUATION ADULT - PERTINENT MEDS FT
MEDICATIONS  (STANDING):  alteplase  Injectable for Pleural Effusion 10 milliGRAM(s) IntraPleural. once  cefepime   IVPB 2000 milliGRAM(s) IV Intermittent every 8 hours  cholecalciferol 5000 Unit(s) Oral daily  dornase andrea Solution for Pleural Effusion 5 milliGRAM(s) IntraPleural. once  fluticasone propionate/ salmeterol 250-50 MICROgram(s) Diskus 1 Dose(s) Inhalation two times a day  hydrochlorothiazide 12.5 milliGRAM(s) Oral daily  metroNIDAZOLE  IVPB 500 milliGRAM(s) IV Intermittent every 8 hours  montelukast 10 milliGRAM(s) Oral daily  multivitamin 1 Tablet(s) Oral daily  mupirocin 2% Ointment 1 Application(s) Topical two times a day  nebivolol 5 milliGRAM(s) Oral daily  sodium chloride 0.9% Solution for Pleural Effusion 30 milliLiter(s) IntraPleural. once  sodium chloride 0.9% Solution for Pleural Effusion 20 milliLiter(s) IntraPleural. once  sodium chloride 0.9%. 1000 milliLiter(s) (100 mL/Hr) IV Continuous <Continuous>    MEDICATIONS  (PRN):  acetaminophen     Tablet .. 650 milliGRAM(s) Oral every 6 hours PRN Temp greater or equal to 38C (100.4F), Mild Pain (1 - 3)  albuterol/ipratropium for Nebulization 3 milliLiter(s) Nebulizer every 6 hours PRN Shortness of Breath and/or Wheezing  bisacodyl 5 milliGRAM(s) Oral every 12 hours PRN Constipation  melatonin 3 milliGRAM(s) Oral at bedtime PRN Insomnia

## 2023-10-26 NOTE — DIETITIAN INITIAL EVALUATION ADULT - OTHER INFO
53 year old female a PMH od Asthma, SVT, HNP, Migraine presented to the ED secondary to fever. Diagnosed with Pleural Effusion, s/p chest tube (10/23) per chart.    Patient reports good appetite. Intakes are % per RN flow sheet. No GI distress or chewing/swallowing difficulties. Has food allergy to corn, melons, cantaloupe and sesame. Reports at one point weighing 450 lbs. and has been gradually losing weight. ABW is 279.7 lbs. (10/20) per chart indicating a -37.9% weight loss. No edema or pressure injuries per RN flow sheet.    Patient w/ no questions regarding nutrition at this time.

## 2023-10-26 NOTE — CHART NOTE - NSCHARTNOTESELECT_GEN_ALL_CORE
Left voicemail, no sooner appointment is available for taylor. Please keep the appointment for 09/13/22.    Thoracic Surgery/Event Note

## 2023-10-26 NOTE — PROGRESS NOTE ADULT - SUBJECTIVE AND OBJECTIVE BOX
[facility] Division of Hospital Medicine  Rene FARR NetoDerik) MD Moe  Pager [phone]    SUBJECTIVE:  Chief complaint: Empyema.    Pt seen and evaluated at bedside this Am. no o/n events. States she feels well. No chest pain. No f/c.      ROS: All systems negative except as noted.      Vital Signs Last 24 Hrs  T(C): 37.2 (26 Oct 2023 05:03), Max: 37.2 (26 Oct 2023 05:03)  T(F): 98.9 (26 Oct 2023 05:03), Max: 98.9 (26 Oct 2023 05:03)  HR: 85 (26 Oct 2023 05:03) (83 - 85)  BP: 134/81 (26 Oct 2023 05:03) (126/82 - 134/81)  BP(mean): --  RR: 18 (26 Oct 2023 05:03) (18 - 19)  SpO2: 97% (26 Oct 2023 05:03) (97% - 97%)    Parameters below as of 26 Oct 2023 05:03  Patient On (Oxygen Delivery Method): room air      PHYSICAL EXAM:  Gen- In chair, well-appearing, NAD. Speaks full sentences  Resp- CTAB, improved air entry at left base. no r/r/w.  CVS- RRR, S1S2, no g/r/m.  GI- Soft abd, NT, ND, +BSx4  Ext- No C/C.         MEDICATION:  MEDICATIONS  (STANDING):  alteplase  Injectable for Pleural Effusion 10 milliGRAM(s) IntraPleural. once  cefepime   IVPB 2000 milliGRAM(s) IV Intermittent every 8 hours  cholecalciferol 5000 Unit(s) Oral daily  dornase andrea Solution for Pleural Effusion 5 milliGRAM(s) IntraPleural. once  fluticasone propionate/ salmeterol 250-50 MICROgram(s) Diskus 1 Dose(s) Inhalation two times a day  hydrochlorothiazide 12.5 milliGRAM(s) Oral daily  metroNIDAZOLE  IVPB 500 milliGRAM(s) IV Intermittent every 8 hours  montelukast 10 milliGRAM(s) Oral daily  multivitamin 1 Tablet(s) Oral daily  mupirocin 2% Ointment 1 Application(s) Topical two times a day  nebivolol 5 milliGRAM(s) Oral daily  sodium chloride 0.9% Solution for Pleural Effusion 30 milliLiter(s) IntraPleural. once  sodium chloride 0.9% Solution for Pleural Effusion 20 milliLiter(s) IntraPleural. once  sodium chloride 0.9%. 1000 milliLiter(s) (100 mL/Hr) IV Continuous <Continuous>    MEDICATIONS  (PRN):  acetaminophen     Tablet .. 650 milliGRAM(s) Oral every 6 hours PRN Temp greater or equal to 38C (100.4F), Mild Pain (1 - 3)  albuterol/ipratropium for Nebulization 3 milliLiter(s) Nebulizer every 6 hours PRN Shortness of Breath and/or Wheezing  bisacodyl 5 milliGRAM(s) Oral every 12 hours PRN Constipation  melatonin 3 milliGRAM(s) Oral at bedtime PRN Insomnia            LABORATORY:                          11.7   9.60  )-----------( 535      ( 26 Oct 2023 06:22 )             36.5     10-26    135  |  99  |  8   ----------------------------<  111<H>  3.8   |  26  |  0.49<L>    Ca    9.3      26 Oct 2023 06:22  Phos  3.0     10-26  Mg     2.00     10-26        Urinalysis Basic - ( 26 Oct 2023 06:22 )    Color: x / Appearance: x / SG: x / pH: x  Gluc: 111 mg/dL / Ketone: x  / Bili: x / Urobili: x   Blood: x / Protein: x / Nitrite: x   Leuk Esterase: x / RBC: x / WBC x   Sq Epi: x / Non Sq Epi: x / Bacteria: x            SARS-CoV-2: NotDetec (19 Oct 2023 16:41)

## 2023-10-26 NOTE — DISCHARGE NOTE PROVIDER - HOSPITAL COURSE
53 year old female, with past history significant for Asthma, SVT, HNP, Migraine presented to the ED secondary to fever.  Diagnosed with Pleural Effusion in the ED. CT imaging  revealed loculated left sided pleural effusion w/ associated left lower lobe consolidation. Met criteria for sepsis. Pulm/ CTS c/s for fruther evaluation. ID also on board. Pt had IR-guided pigtail catheter placement. Fluid studies consistent w/ empyema. ID advising CTS f/u. Pt s/p MIST x2. Fluid cx growing staph intermedius. Cytology was negative. Pigtail catheter has been removed. Follow up CXR unremarkable. Cleared from CTS perspective. ID recommendation also provided for abx therapy. At this point - pt med stable for DC. Planning for 4-6 wks of abx. She will also need repeat CT chest in 4 weeks to ensure improvement of consolidation and there is no underlying mass present. Otherwise, pt will return home.      DC Dx:  Severe sepsis 2' streptococcus intermedius pneumonia complicated by empyema  s/p pigtail catheter for drainage, now removed  Hx of SVT   Hypovolemic hyponatremia  Hypokalemia  Transaminitis 2' sepsis

## 2023-10-26 NOTE — DIETITIAN INITIAL EVALUATION ADULT - ORAL INTAKE PTA/DIET HISTORY
Patient seen for assessment. Reports good appetite PTA. Follows a high protein diet for weight loss.

## 2023-10-26 NOTE — PROGRESS NOTE ADULT - PROBLEM SELECTOR PLAN 8
DVT ppx-  heparin SQ    Dispo- Anticipate home pending further infectious workup.
DVT ppx-  hold HSQ - restart when ok by IR.    Dispo- Pending infectious workup. Anticipate home when stable.
DVT ppx-  hold HSQ - restart when ok by IR.    Dispo- Pending infectious workup. Anticipate home when stable.
DVT ppx-  pt is ambulatory    Dispo- Pending PTC removal - anticipate DC home in next 24h.
DVT ppx-  pt is ambulatory    Dispo- Pending infectious workup. Anticipate home when stable.
DVT ppx-  heparin SQ    Dispo- Anticipate home pending further infectious workup.
DVT ppx-  heparin SQ - holding after PM dose.    Dispo- Pending infectious workup. Anticipate home when stable.

## 2023-10-26 NOTE — CHART NOTE - NSCHARTNOTEFT_GEN_A_CORE
pt seen and examined w Dr Ugalde  PTC removed, pt tolerated well  urgent CXR reviewed  no objection to discharge  antibiotic regimen as per ID and medicine  pt to get repeat CT chest in 4 weeks  d/w pt and medical team    Anayeli BROWN [id number]

## 2023-10-26 NOTE — DIETITIAN INITIAL EVALUATION ADULT - PROBLEM SELECTOR PLAN 4
Alert/Awake - ECG = sinus rhythm w/ frequent PVCs at 97 bpm, QTc = 515, RBBB, TWI in III  - no prior study for comparison  - TSH = 1.14 (AM lab-work)  - f/u repeat study in the AM  - f/u electrolytes and correct any imbalances

## 2023-10-26 NOTE — DISCHARGE NOTE PROVIDER - NSDCMRMEDTOKEN_GEN_ALL_CORE_FT
Advair Diskus 250 mcg-50 mcg inhalation powder: 1 inhaled  Bystolic 5 mg oral tablet: 1 tab(s) orally once a day  calcium:   cholecalciferol 125 mcg (5000 intl units) oral capsule: 1 cap(s) orally once a day  Estroven Menopause Supplement oral tablet: 1 tab(s) orally  hydroCHLOROthiazide 12.5 mg oral tablet: 1 tab(s) orally once a day  magnesium:   montelukast 10 mg oral tablet: 1 tab(s) orally once a day  Multiple Vitamins oral tablet: 1 tab(s) orally once a day  tumeric:    Advair Diskus 250 mcg-50 mcg inhalation powder: 1 inhaled  Bystolic 5 mg oral tablet: 1 tab(s) orally once a day  calcium:   cefuroxime 500 mg oral tablet: 1 tab(s) orally 2 times a day  cholecalciferol 125 mcg (5000 intl units) oral capsule: 1 cap(s) orally once a day  Estroven Menopause Supplement oral tablet: 1 tab(s) orally  hydroCHLOROthiazide 12.5 mg oral tablet: 1 tab(s) orally once a day  magnesium:   metroNIDAZOLE 500 mg oral tablet: 1 tab(s) orally 3 times a day  montelukast 10 mg oral tablet: 1 tab(s) orally once a day  Multiple Vitamins oral tablet: 1 tab(s) orally once a day  tumeric:    Advair Diskus 250 mcg-50 mcg inhalation powder: 1 inhaled  amoxicillin-clavulanate 875 mg-125 mg oral tablet: 1 tab(s) orally every 12 hours  Bystolic 5 mg oral tablet: 1 tab(s) orally once a day  calcium:   cholecalciferol 125 mcg (5000 intl units) oral capsule: 1 cap(s) orally once a day  Estroven Menopause Supplement oral tablet: 1 tab(s) orally  hydroCHLOROthiazide 12.5 mg oral tablet: 1 tab(s) orally once a day  magnesium:   montelukast 10 mg oral tablet: 1 tab(s) orally once a day  Multiple Vitamins oral tablet: 1 tab(s) orally once a day  tumeric:

## 2023-10-26 NOTE — DISCHARGE NOTE PROVIDER - ATTENDING DISCHARGE PHYSICAL EXAMINATION:
Pt seen and evaluated at bedside this AM. No o/n events. DEnies any SOB/CP/NV. FEels well. No complaints.    Gen- In chair, well-appearing, NAD. Speaks full sentences  Resp- CTAB, improved air entry at left base. no r/r/w.  CVS- RRR, S1S2, no g/r/m.  GI- Soft abd, NT, ND, +BSx4  Ext- No C/C.

## 2023-10-26 NOTE — PROGRESS NOTE ADULT - PROBLEM SELECTOR PROBLEM 5
Dehydration with hyponatremia

## 2023-10-27 ENCOUNTER — TRANSCRIPTION ENCOUNTER (OUTPATIENT)
Age: 53
End: 2023-10-27

## 2023-10-27 VITALS
RESPIRATION RATE: 18 BRPM | DIASTOLIC BLOOD PRESSURE: 76 MMHG | TEMPERATURE: 98 F | HEART RATE: 82 BPM | SYSTOLIC BLOOD PRESSURE: 129 MMHG | OXYGEN SATURATION: 100 %

## 2023-10-27 PROCEDURE — 99239 HOSP IP/OBS DSCHRG MGMT >30: CPT

## 2023-10-27 PROCEDURE — 71045 X-RAY EXAM CHEST 1 VIEW: CPT | Mod: 26

## 2023-10-27 PROCEDURE — 99232 SBSQ HOSP IP/OBS MODERATE 35: CPT

## 2023-10-27 RX ADMIN — CEFEPIME 100 MILLIGRAM(S): 1 INJECTION, POWDER, FOR SOLUTION INTRAMUSCULAR; INTRAVENOUS at 05:32

## 2023-10-27 RX ADMIN — Medication 1 TABLET(S): at 14:20

## 2023-10-27 RX ADMIN — NEBIVOLOL HYDROCHLORIDE 5 MILLIGRAM(S): 5 TABLET ORAL at 05:38

## 2023-10-27 RX ADMIN — MONTELUKAST 10 MILLIGRAM(S): 4 TABLET, CHEWABLE ORAL at 14:20

## 2023-10-27 RX ADMIN — Medication 5000 UNIT(S): at 14:19

## 2023-10-27 RX ADMIN — Medication 100 MILLIGRAM(S): at 06:21

## 2023-10-27 RX ADMIN — FLUTICASONE PROPIONATE AND SALMETEROL 1 DOSE(S): 50; 250 POWDER ORAL; RESPIRATORY (INHALATION) at 09:09

## 2023-10-27 NOTE — PROGRESS NOTE ADULT - SUBJECTIVE AND OBJECTIVE BOX
PULMONARY PROGRESS NOTE    AKILAH MARQUEZ  MRN-[id number]    Patient is a 53y old  Female who presents with a chief complaint of Sepsis due to undetermined organism, Left lower lobe pneumonia, Loculate pleural effusion (27 Oct 2023 08:59)      HPI:  -overnight events noted  on room air  pending dc  no fever  -    ROS:   -    ACTIVE MEDICATION LIST:  MEDICATIONS  (STANDING):  alteplase  Injectable for Pleural Effusion 10 milliGRAM(s) IntraPleural. once  amoxicillin  875 milliGRAM(s)/clavulanate 1 Tablet(s) Oral every 12 hours  cholecalciferol 5000 Unit(s) Oral daily  dornase andrea Solution for Pleural Effusion 5 milliGRAM(s) IntraPleural. once  fluticasone propionate/ salmeterol 250-50 MICROgram(s) Diskus 1 Dose(s) Inhalation two times a day  hydrochlorothiazide 12.5 milliGRAM(s) Oral daily  montelukast 10 milliGRAM(s) Oral daily  multivitamin 1 Tablet(s) Oral daily  nebivolol 5 milliGRAM(s) Oral daily  sodium chloride 0.9% Solution for Pleural Effusion 30 milliLiter(s) IntraPleural. once  sodium chloride 0.9% Solution for Pleural Effusion 20 milliLiter(s) IntraPleural. once  sodium chloride 0.9%. 1000 milliLiter(s) (100 mL/Hr) IV Continuous <Continuous>    MEDICATIONS  (PRN):  acetaminophen     Tablet .. 650 milliGRAM(s) Oral every 6 hours PRN Temp greater or equal to 38C (100.4F), Mild Pain (1 - 3)  albuterol/ipratropium for Nebulization 3 milliLiter(s) Nebulizer every 6 hours PRN Shortness of Breath and/or Wheezing  bisacodyl 5 milliGRAM(s) Oral every 12 hours PRN Constipation  melatonin 3 milliGRAM(s) Oral at bedtime PRN Insomnia      EXAM:  Vital Signs Last 24 Hrs  T(C): 36.8 (27 Oct 2023 11:39), Max: 37.1 (26 Oct 2023 14:22)  T(F): 98.3 (27 Oct 2023 11:39), Max: 98.7 (26 Oct 2023 14:22)  HR: 82 (27 Oct 2023 11:39) (76 - 85)  BP: 129/76 (27 Oct 2023 11:39) (117/65 - 140/85)  BP(mean): --  RR: 18 (27 Oct 2023 11:39) (18 - 19)  SpO2: 100% (27 Oct 2023 11:39) (100% - 100%)    Parameters below as of 27 Oct 2023 11:39  Patient On (Oxygen Delivery Method): room air        GENERAL: The patient is awake and alert in no apparent distress.     LUNGS: no crackles  decrease at right base  not labored  not wheezing                        11.7   9.60  )-----------( 535      ( 26 Oct 2023 06:22 )             36.5       10-26    135  |  99  |  8   ----------------------------<  111<H>  3.8   |  26  |  0.49<L>    Ca    9.3      26 Oct 2023 06:22  Phos  3.0     10-26  Mg     2.00     10-26     < from: Xray Chest 1 View- PORTABLE-Urgent (Xray Chest 1 View- PORTABLE-Urgent .) (10.26.23 @ 14:41) >    ACC: [id number] EXAM:  XR CHEST PORTABLE ROUTINE 1V   ORDERED BY: NIKKIE VASQUEZ     ACC: [id number] EXAM:  XR CHEST PORTABLE URGENT 1V   ORDERED BY: CATALINO CLARKE     PROCEDURE DATE:  10/26/2023          INTERPRETATION:  CLINICAL INFORMATION: Follow-up pre and post chest tube   removal.    TIME OF EXAMINATION: October 26 at 8:48 AM and 1:13 PM    EXAM: Portable chest    FINDINGS:  8:48 AM:  Left-sided pigtail catheter is present. Loculated left effusion   unchanged. Visible lungs are clear.    1:13 PM:  The left chest tube has been removed. No complicating pneumothorax. Loss   of volume in the left lung post thoracotomy.        COMPARISON: October 25        IMPRESSION: Follow-up studies pre and post left chest tube removal.    --- End of Report ---            XAVIER WILLETT MD; Attending Radiologist  This document has been electronically signed. Oct 26 2023  7:03PM    < end of copied text >  < from: CT Chest No Cont (10.19.23 @ 17:53) >    ACC: [id number] EXAM:  CT CHEST   ORDERED BY: RYDER CHAMORRO     PROCEDURE DATE:  10/19/2023          INTERPRETATION:  CLINICAL INFORMATION: Left pleural effusion    COMPARISON: Chest CT 1/14/2015, chest x-ray 10/19/2023    CONTRAST/COMPLICATIONS:  IV Contrast: None  Oral Contrast: None  Complications: None reported    PROCEDURE:  CT scan of the chest was obtained without intravenous contrast.    FINDINGS:    Trachea and mainstem bronchi patent. Lingular and left basilar   parenchymal opacification. Moderate size loculated left pleural effusion   and associated mediastinal pleural thickening (for example image 59,   series 2).    Right lower lobe superior segment 2.5 x 2 cm nodular consolidative   opacity (image 79, series 2).    Few mildly enlarged mediastinal nodes; reference precarinal 1.9 x 1.2 cm   node (image 57, series 2). Left internal mammary node measures 0.7 cm   (image 65, series 2).    Heart size normal. No pericardial effusion. Coronary calcifications.   Normal caliber thoracic aorta.    Cholecystectomy. Soft tissues and osseous structures unremarkable.      IMPRESSION:    Right lower lobe superior segment 2.5 cm nodular consolidative opacity;   differential diagnosis includes infection and primary lung malignancy.   Recommend CT chest follow-up in one month to help differentiate between   these two etiologies.    Moderate-sized loculated left pleural effusion with associated   mediastinal pleural thickening. The exact etiology is unclear, but   malignancy is a diagnostic consideration.    Lingular and left lower lobe parenchymal opacification may represent   atelectasis or pneumonia.    Mildly enlarged mediastinal nodes and left internal mammary node are   indeterminate and recommend attention on CT chest follow-up.    --- End of Report ---          JIMI WEAVER MD; Resident Radiologist  This document has been electronically signed.  JOSELYN SALTER MD; Attending Radiologist  This document has been electronically signed. Oct 19 2023  6:19PM    < end of copied text >      PROBLEM LIST:  53y Female with HEALTH ISSUES - PROBLEM Dx:  LLL pneumonia    Hypoalbuminemia    Sepsis due to undetermined organism    Prophylactic measure    ECG abnormality    Dehydration with hyponatremia    Loculated pleural effusion    Elevated bilirubin    SVT (supraventricular tachycardia)    Empyema lung              RECS:  appreciate CTS/IR/ID  abx per ID  has f/u 11/9 with DR Brown  take IS home and use it hourly  resume home bronchodilators    no pulm objection to dc planning    Please call with any questions over the weekend    Elizabeth eRes DO  [facility] Pulmonary/Sleep Medicine  [phone]

## 2023-10-27 NOTE — PROGRESS NOTE ADULT - SUBJECTIVE AND OBJECTIVE BOX
[facility]  Division of Infectious Diseases  [phone]    Name: AKILAH MARQUEZ  Age: 53y  Gender: Female  MRN: [id number]    Interval History--  Notes reviewed.     Past Medical History--  SVT (supraventricular tachycardia)    Asthma    Migraine    Pyogenic granuloma    Schamberg's disease    Cervical spine degeneration    HNP (herniated nucleus pulposus), cervical    HNP (herniated nucleus pulposus), thoracic    History of lumpectomy of left breast    History of cholecystectomy    Venous stasis ulcer of right lower extremity    Breast cyst, left        For details regarding the patient's social history, family history, and other miscellaneous elements, please refer the initial infectious diseases consultation and/or the admitting history and physical examination for this admission.    Allergies    levofloxacin (Rash)  Corn (Unknown)  Sesame (Unknown)  penicillin (Rash)  clarithromycin (Rash)  Melons, Cantaloupe (Unknown)  doxycycline (Rash)    Intolerances        Medications--  Antibiotics:  cefepime   IVPB 2000 milliGRAM(s) IV Intermittent every 8 hours  metroNIDAZOLE  IVPB 500 milliGRAM(s) IV Intermittent every 8 hours    Immunologic:    Other:  acetaminophen     Tablet .. PRN  albuterol/ipratropium for Nebulization PRN  alteplase  Injectable for Pleural Effusion  bisacodyl PRN  cholecalciferol  dornase andrea Solution for Pleural Effusion  fluticasone propionate/ salmeterol 250-50 MICROgram(s) Diskus  hydrochlorothiazide  melatonin PRN  montelukast  multivitamin  nebivolol  sodium chloride 0.9% Solution for Pleural Effusion  sodium chloride 0.9% Solution for Pleural Effusion  sodium chloride 0.9%.      Review of Systems--  A 10-point review of systems was obtained.     Pertinent positives and negatives--  Constitutional: No fevers. No Chills. No Rigors.   Cardiovascular: No chest pain. No palpitations.  Respiratory: No shortness of breath. No cough.  Gastrointestinal: No nausea or vomiting. No diarrhea or constipation.   Psychiatric: Pleasant. Appropriate affect.    Review of systems otherwise negative except as previously noted.    Physical Examination--  Vital Signs: T(F): 97.9 (10-27-23 @ 05:09), Max: 98.7 (10-26-23 @ 14:22)  HR: 76 (10-27-23 @ 05:09)  BP: 132/62 (10-27-23 @ 05:09)  RR: 18 (10-27-23 @ 05:09)  SpO2: 100% (10-27-23 @ 05:09)  Wt(kg): --  General: Nontoxic-appearing Female in no acute distress.  HEENT: AT/NC. PERRL. EOMI. Anicteric. Conjunctiva pink and moist. Oropharynx clear. Dentition fair.  Neck: Not rigid. No sense of mass.  Nodes: None palpable.  Lungs: Clear bilaterally without rales, wheezing or rhonchi  Heart: Regular rate and rhythm. No Murmur. No rub. No gallop. No palpable thrill.  Abdomen: Bowel sounds present and normoactive. Soft. Nondistended. Nontender. No sense of mass. No organomegaly.  Back: No spinal tenderness. No costovertebral angle tenderness.   Extremities: No cyanosis or clubbing. No edema.   Skin: Warm. Dry. Good turgor. No rash. No vasculitic stigmata.  Psychiatric: Appropriate affect and mood for situation.         Laboratory Studies--  CBC                        11.7   9.60  )-----------( 535      ( 26 Oct 2023 06:22 )             36.5       Chemistries  10-26    135  |  99  |  8   ----------------------------<  111<H>  3.8   |  26  |  0.49<L>    Ca    9.3      26 Oct 2023 06:22  Phos  3.0     10-26  Mg     2.00     10-26        Culture Data    Culture - Acid Fast - Body Fluid w/Smear (collected 23 Oct 2023 15:00)  Source: .Body Fluid left pleural effusion  Preliminary Report (25 Oct 2023 15:07):    Culture is being performed.    Culture - Fungal, Body Fluid (collected 23 Oct 2023 15:00)  Source: .Body Fluid left pleural effusion  Preliminary Report (25 Oct 2023 15:02):    Culture is being performed. Fungal cultures are held for 4 weeks.    Culture - Body Fluid with Gram Stain (collected 23 Oct 2023 15:00)  Source: .Body Fluid left pleural effusion  Gram Stain (25 Oct 2023 17:52):    Numerous polymorphonuclear leukocytes seen per low power field    No organisms seen per oil power field  Preliminary Report (25 Oct 2023 17:52):    Few Streptococcus intermedius  Organism: Streptococcus intermedius (26 Oct 2023 17:45)  Organism: Streptococcus intermedius (26 Oct 2023 17:45)    Culture - Blood (collected 21 Oct 2023 06:56)  Source: .Blood Blood-Peripheral  Final Report (26 Oct 2023 11:01):    No growth at 5 days    Culture - Blood (collected 21 Oct 2023 06:41)  Source: .Blood Blood-Peripheral  Final Report (26 Oct 2023 11:01):    No growth at 5 days             [facility]  Division of Infectious Diseases  [phone]    Name: AKILAH MARQUEZ  Age: 53y  Gender: Female  MRN: [id number]    Interval History--  Notes reviewed. Feels fine. CT removed. Cleared by CTS for DC. No fevers, chills, or rigors. Asking about activity levels- deferred to CTS.     Past Medical History--  SVT (supraventricular tachycardia)    Asthma    Migraine    Pyogenic granuloma    Schamberg's disease    Cervical spine degeneration    HNP (herniated nucleus pulposus), cervical    HNP (herniated nucleus pulposus), thoracic    History of lumpectomy of left breast    History of cholecystectomy    Venous stasis ulcer of right lower extremity    Breast cyst, left        For details regarding the patient's social history, family history, and other miscellaneous elements, please refer the initial infectious diseases consultation and/or the admitting history and physical examination for this admission.    Allergies    levofloxacin (Rash)  Corn (Unknown)  Sesame (Unknown)  penicillin (Rash)  clarithromycin (Rash)  Melons, Cantaloupe (Unknown)  doxycycline (Rash)    Intolerances        Medications--  Antibiotics:  cefepime   IVPB 2000 milliGRAM(s) IV Intermittent every 8 hours  metroNIDAZOLE  IVPB 500 milliGRAM(s) IV Intermittent every 8 hours    Immunologic:    Other:  acetaminophen     Tablet .. PRN  albuterol/ipratropium for Nebulization PRN  alteplase  Injectable for Pleural Effusion  bisacodyl PRN  cholecalciferol  dornase andrea Solution for Pleural Effusion  fluticasone propionate/ salmeterol 250-50 MICROgram(s) Diskus  hydrochlorothiazide  melatonin PRN  montelukast  multivitamin  nebivolol  sodium chloride 0.9% Solution for Pleural Effusion  sodium chloride 0.9% Solution for Pleural Effusion  sodium chloride 0.9%.      Review of Systems--  A 10-point review of systems was obtained.   Review of systems otherwise unchanged compared to prior visit except as previously noted.    Physical Examination--  Vital Signs: T(F): 97.9 (10-27-23 @ 05:09), Max: 98.7 (10-26-23 @ 14:22)  HR: 76 (10-27-23 @ 05:09)  BP: 132/62 (10-27-23 @ 05:09)  RR: 18 (10-27-23 @ 05:09)  SpO2: 100% (10-27-23 @ 05:09)  Wt(kg): --  General: Nontoxic-appearing Female in no acute distress.  HEENT: AT/NC. Anicteric. Conjunctiva pink and moist.  Neck: Not rigid. No sense of mass.  Nodes: None palpable.  Lungs: Clear B  Heart: Regular rate and rhythm.   Abdomen: Bowel sounds present and normoactive. Soft. Nondistended. Nontender.  Extremities: No cyanosis or clubbing. 1+ LE edema.   Skin: Warm. Dry. Good turgor. stasis-like changes LE.  No vasculitic stigmata.  Psychiatric: Appropriate affect and mood for situation.       Laboratory Studies--  CBC                        11.7   9.60  )-----------( 535      ( 26 Oct 2023 06:22 )             36.5       Chemistries  10-26    135  |  99  |  8   ----------------------------<  111<H>  3.8   |  26  |  0.49<L>    Ca    9.3      26 Oct 2023 06:22  Phos  3.0     10-26  Mg     2.00     10-26        Culture Data    Culture - Acid Fast - Body Fluid w/Smear (collected 23 Oct 2023 15:00)  Source: .Body Fluid left pleural effusion  Preliminary Report (25 Oct 2023 15:07):    Culture is being performed.    Culture - Fungal, Body Fluid (collected 23 Oct 2023 15:00)  Source: .Body Fluid left pleural effusion  Preliminary Report (25 Oct 2023 15:02):    Culture is being performed. Fungal cultures are held for 4 weeks.    Culture - Body Fluid with Gram Stain (collected 23 Oct 2023 15:00)  Source: .Body Fluid left pleural effusion  Gram Stain (25 Oct 2023 17:52):    Numerous polymorphonuclear leukocytes seen per low power field    No organisms seen per oil power field  Preliminary Report (25 Oct 2023 17:52):    Few Streptococcus intermedius  Organism: Streptococcus intermedius (26 Oct 2023 17:45)  Organism: Streptococcus intermedius (26 Oct 2023 17:45)    Culture - Blood (collected 21 Oct 2023 06:56)  Source: .Blood Blood-Peripheral  Final Report (26 Oct 2023 11:01):    No growth at 5 days    Culture - Blood (collected 21 Oct 2023 06:41)  Source: .Blood Blood-Peripheral  Final Report (26 Oct 2023 11:01):    No growth at 5 days

## 2023-10-27 NOTE — DISCHARGE NOTE NURSING/CASE MANAGEMENT/SOCIAL WORK - PATIENT PORTAL LINK FT
You can access the FollowMyHealth Patient Portal offered by [facility] by registering at the following website: [email or web address]. By joining [employer]’s FollowMyHealth portal, you will also be able to view your health information using other applications (apps) compatible with our system.

## 2023-10-27 NOTE — PROGRESS NOTE ADULT - PROVIDER SPECIALTY LIST ADULT
Infectious Disease
Pulmonology
Pulmonology
Thoracic Surgery
Intervent Radiology
Thoracic Surgery
CT Surgery
CT Surgery
Infectious Disease
Pulmonology
Pulmonology
Thoracic Surgery
Infectious Disease
Infectious Disease
Hospitalist

## 2023-10-27 NOTE — PROGRESS NOTE ADULT - REASON FOR ADMISSION
Sepsis due to undetermined organism, Left lower lobe pneumonia, Loculate pleural effusion

## 2023-10-27 NOTE — PROGRESS NOTE ADULT - ASSESSMENT
52yo Female with pmh of pneumonia and sepsis who was found to have a loculated small left pleural effusion with unsuccessful attempts made by CT surgery, IR consulted for left pleural effusion pigtail drainage catheter placement. Patient is now s/p left chest tube placement on 10/23 in IR.     Plan:  - Continue drainage, monitor output  - f/u fluid studies       [id number]
53F w PMH Asthma, SVT, HNP, HA, now with fever, L sided CP, ipsilateral loculated pleural effusion, and multilobar pneumonia. Probable empyema.     10/23: Suspect source control remains the issue here rather than ineffective antibiotics. Adequate drainage is the crucial element. Antibiotics necessary but of secondary importance. Persistent fever an leukocytosis not surprising. Urine pneumococcal Ag negative. Would HIV test- doubt opportunistic pathogen here, more concern that increased risk for classical human pathogen. Depending on pleural fluid studies will need to consider broadening DDx    Suggestions--  Await pleural fluid sampling (Cell count, diff, protein, glucose, pH, LDH, culture/gram stain, AFB stain/culture, fungal stain/culture, cytology),  and drainage.   Continue antibiotics  Would HIV test as per prior.     Leonid Brizuela MD  Attending Physician  [facility]  Division of Infectious Diseases  [phone]    
53 year old female, with past history significant for Asthma, SVT, HNP, Migraine presented to the ED secondary to fever.  Diagnosed with Pleural Effusion in the ED. CT imaging  revealed loculated left sided pleural effusion w/ associated left lower lobe consolidation. Met criteria for sepsis. Pulm/ CTS c/s for fruther evaluation. ID also on board. Pt had IR-guided pigtail catheter placement. Fluid studies consistent w/ empyema. ID advising CTS f/u. Pt s/p MIST x2. Fluid cx growing staph intermedius. Cytology was negative. Clinically improved - pending PT removal.
53 year old female, with past history significant for Asthma, SVT, HNP, Migraine presented to the ED secondary to fever.  Diagnosed with Pleural Effusion in the ED. CT imaging  revealed loculated left sided pleural effusion w/ associated left lower lobe consolidation. Met criteria for sepsis. Pulm/ CTS c/s for fruther evaluation. ID also on board. Pt had IR-guided pigtail catheter placement. Fluid studies consistent w/ empyema. ID advising CTS f/u. Pt s/p MIST. Pending further improvement and fluid results.
53F w PMH Asthma, SVT, HNP, HA, now with fever, L sided CP, ipsilateral loculated pleural effusion, and multilobar pneumonia. Probable empyema.     10/23: Suspect source control remains the issue here rather than ineffective antibiotics. Adequate drainage is the crucial element. Antibiotics necessary but of secondary importance. Persistent fever an leukocytosis not surprising. Urine pneumococcal Ag negative. Would HIV test- doubt opportunistic pathogen here, more concern that increased risk for classical human pathogen. Depending on pleural fluid studies will need to consider broadening DDx  10/24: S/P CT. fluid c/w empyema, after assessment s/p tPA instillation. HIV-negative.    Suggestions--  Continue antibiotics  F/U Cx data    Reviewed with primary team  D/W patient    Dr. Vizcarra is covering this patient on 10/25. Please reach out to them directly if ID input is needed.    Leonid Brizuela MD  Attending Physician  [facility]  Division of Infectious Diseases  [phone]    
53F w PMH Asthma, SVT, HNP, HA, now with fever, L sided CP, ipsilateral loculated pleural effusion, and multilobar pneumonia. Probable empyema.     10/23: Suspect source control remains the issue here rather than ineffective antibiotics. Adequate drainage is the crucial element. Antibiotics necessary but of secondary importance. Persistent fever an leukocytosis not surprising. Urine pneumococcal Ag negative. Would HIV test- doubt opportunistic pathogen here, more concern that increased risk for classical human pathogen. Depending on pleural fluid studies will need to consider broadening DDx  10/24: S/P CT. fluid c/w empyema, after assessment s/p tPA instillation. HIV-negative.  10/26: Overall doing well.     Suggestions--  F/U imaging re: adequacy of drainage  Continue antibiotics  Once ready for discharge from CTS perspective can change to:  ·	Ceftin 500mg PO Q12H  plus  ·	Flagyl 500mg PO 3x/day  Plan for 4-6 weeks total therapy    Leonid Brizuela MD  Attending Physician  [facility]  Division of Infectious Diseases  [phone]    
53 year old female, with past history significant for Asthma, SVT, HNP, Migraine presented to the ED secondary to fever.  Diagnosed with Pleural Effusion in the ED. CT imaging  revealed loculated left sided pleural effusion w/ associated left lower lobe consolidation. Met criteria for sepsis. Pulm/ CTS c/s for fruther evaluation. ID also on board. Remains hospitalized pending further infectious workup.
53 year old female, with past history significant for Asthma, SVT, HNP, Migraine presented to the ED secondary to fever.  Diagnosed with Pleural Effusion in the ED. CT imaging  revealed loculated left sided pleural effusion w/ associated left lower lobe consolidation. Met criteria for sepsis. Pulm/ CTS c/s for fruther evaluation. Remains hospitalized pending further infectious workup.
53F w PMH Asthma, SVT, HNP, HA, now with fever, L sided CP, ipsilateral loculated pleural effusion, and multilobar pneumonia. Probable empyema.     10/23: Suspect source control remains the issue here rather than ineffective antibiotics. Adequate drainage is the crucial element. Antibiotics necessary but of secondary importance. Persistent fever an leukocytosis not surprising. Urine pneumococcal Ag negative. Would HIV test- doubt opportunistic pathogen here, more concern that increased risk for classical human pathogen. Depending on pleural fluid studies will need to consider broadening DDx  10/24: S/P CT. fluid c/w empyema, after assessment s/p tPA instillation. HIV-negative.  10/26: Overall doing well.   10/27: Doing well. Re-reviewed patient's allergies with her. She tolerates Augmentin without difficulty and as such is not pencillin allergic.     Suggestions--  Augementin 875mg PO Q12H  The risks, benefits and alternatives of Augmentin were addressed in layman's terms. This included but was not limited to rash, allergy, GI intolerance, colitis. The patient voiced understanding and all questions were answered.   Plan for 4-6 weeks total therapy  Interval CT to assess for resolution of changes  F/U with pulmonary prior to cessation of antibiotics  Use of probiotics addressed- yogurt, kefir.     I'll sign off at this time.  Thank you for the courtesy of this referral.      Leonid Brizuela MD  Attending Physician  [facility]  Division of Infectious Diseases  [phone]    
[ x ]  Lab studies personally reviewed  [ x ]  Radiology personally reviewed  [ x ]  Old records personally reviewed (including HIE)    53 year old female, with past history significant for Asthma, SVT, HNP, Migraine presented to the ED secondary to fever.  Diagnosed with Pleural Effusion in the ED.
53 year old female, with past history significant for Asthma, SVT, HNP, Migraine presented to the ED secondary to fever.  Diagnosed with Pleural Effusion in the ED. CT imaging  revealed loculated left sided pleural effusion w/ associated left lower lobe consolidation. Met criteria for sepsis. Pulm/ CTS c/s for fruther evaluation. ID also on board. Remains hospitalized pending further infectious workup.
53 year old female, with past history significant for Asthma, SVT, HNP, Migraine presented to the ED secondary to fever.  Diagnosed with Pleural Effusion in the ED. CT imaging  revealed loculated left sided pleural effusion w/ associated left lower lobe consolidation. Met criteria for sepsis. Pulm/ CTS c/s for fruther evaluation. ID also on board. Pt had IR-guided pigtail catheter placement. Fluid studies consistent w/ empyema. ID advising CTS f/u. Pt s/p MIST. Pending furhter improvement and fluid results.

## 2023-10-28 LAB
CULTURE RESULTS: ABNORMAL
CULTURE RESULTS: ABNORMAL
ORGANISM # SPEC MICROSCOPIC CNT: ABNORMAL
SPECIMEN SOURCE: SIGNIFICANT CHANGE UP
SPECIMEN SOURCE: SIGNIFICANT CHANGE UP

## 2023-10-30 ENCOUNTER — NON-APPOINTMENT (OUTPATIENT)
Age: 53
End: 2023-10-30

## 2023-10-30 PROBLEM — L81.7 PIGMENTED PURPURIC DERMATOSIS: Chronic | Status: ACTIVE | Noted: 2023-10-19

## 2023-10-30 PROBLEM — L98.0 PYOGENIC GRANULOMA: Chronic | Status: ACTIVE | Noted: 2023-10-19

## 2023-10-30 PROBLEM — I47.10 SUPRAVENTRICULAR TACHYCARDIA, UNSPECIFIED: Chronic | Status: ACTIVE | Noted: 2023-10-19

## 2023-10-30 PROBLEM — M47.812 SPONDYLOSIS WITHOUT MYELOPATHY OR RADICULOPATHY, CERVICAL REGION: Chronic | Status: ACTIVE | Noted: 2023-10-19

## 2023-10-30 PROBLEM — J45.909 UNSPECIFIED ASTHMA, UNCOMPLICATED: Chronic | Status: ACTIVE | Noted: 2023-10-19

## 2023-10-30 PROBLEM — M51.24 OTHER INTERVERTEBRAL DISC DISPLACEMENT, THORACIC REGION: Chronic | Status: ACTIVE | Noted: 2023-10-19

## 2023-10-30 PROBLEM — G43.909 MIGRAINE, UNSPECIFIED, NOT INTRACTABLE, WITHOUT STATUS MIGRAINOSUS: Chronic | Status: ACTIVE | Noted: 2023-10-19

## 2023-11-06 ENCOUNTER — EMERGENCY (EMERGENCY)
Facility: HOSPITAL | Age: 53
LOS: 1 days | Discharge: HOME | End: 2023-11-06
Attending: EMERGENCY MEDICINE | Admitting: EMERGENCY MEDICINE
Payer: MEDICAID

## 2023-11-06 VITALS
TEMPERATURE: 98 F | RESPIRATION RATE: 16 BRPM | DIASTOLIC BLOOD PRESSURE: 81 MMHG | OXYGEN SATURATION: 100 % | HEIGHT: 72 IN | HEART RATE: 84 BPM | SYSTOLIC BLOOD PRESSURE: 126 MMHG

## 2023-11-06 VITALS
RESPIRATION RATE: 15 BRPM | SYSTOLIC BLOOD PRESSURE: 131 MMHG | DIASTOLIC BLOOD PRESSURE: 74 MMHG | OXYGEN SATURATION: 100 % | HEART RATE: 68 BPM

## 2023-11-06 DIAGNOSIS — Z98.890 OTHER SPECIFIED POSTPROCEDURAL STATES: Chronic | ICD-10-CM

## 2023-11-06 DIAGNOSIS — Z90.49 ACQUIRED ABSENCE OF OTHER SPECIFIED PARTS OF DIGESTIVE TRACT: Chronic | ICD-10-CM

## 2023-11-06 DIAGNOSIS — N60.02 SOLITARY CYST OF LEFT BREAST: Chronic | ICD-10-CM

## 2023-11-06 DIAGNOSIS — I83.019 VARICOSE VEINS OF RIGHT LOWER EXTREMITY WITH ULCER OF UNSPECIFIED SITE: Chronic | ICD-10-CM

## 2023-11-06 LAB
ALBUMIN SERPL ELPH-MCNC: 3.6 G/DL — SIGNIFICANT CHANGE UP (ref 3.3–5)
ALBUMIN SERPL ELPH-MCNC: 3.6 G/DL — SIGNIFICANT CHANGE UP (ref 3.3–5)
ALP SERPL-CCNC: 85 U/L — SIGNIFICANT CHANGE UP (ref 40–120)
ALP SERPL-CCNC: 85 U/L — SIGNIFICANT CHANGE UP (ref 40–120)
ALT FLD-CCNC: 27 U/L — SIGNIFICANT CHANGE UP (ref 4–33)
ALT FLD-CCNC: 27 U/L — SIGNIFICANT CHANGE UP (ref 4–33)
ANION GAP SERPL CALC-SCNC: 12 MMOL/L — SIGNIFICANT CHANGE UP (ref 7–14)
ANION GAP SERPL CALC-SCNC: 12 MMOL/L — SIGNIFICANT CHANGE UP (ref 7–14)
AST SERPL-CCNC: 39 U/L — HIGH (ref 4–32)
AST SERPL-CCNC: 39 U/L — HIGH (ref 4–32)
BASOPHILS # BLD AUTO: 0.03 K/UL — SIGNIFICANT CHANGE UP (ref 0–0.2)
BASOPHILS # BLD AUTO: 0.03 K/UL — SIGNIFICANT CHANGE UP (ref 0–0.2)
BASOPHILS NFR BLD AUTO: 0.4 % — SIGNIFICANT CHANGE UP (ref 0–2)
BASOPHILS NFR BLD AUTO: 0.4 % — SIGNIFICANT CHANGE UP (ref 0–2)
BILIRUB SERPL-MCNC: 0.9 MG/DL — SIGNIFICANT CHANGE UP (ref 0.2–1.2)
BILIRUB SERPL-MCNC: 0.9 MG/DL — SIGNIFICANT CHANGE UP (ref 0.2–1.2)
BUN SERPL-MCNC: 5 MG/DL — LOW (ref 7–23)
BUN SERPL-MCNC: 5 MG/DL — LOW (ref 7–23)
CA-I BLD-SCNC: 1.18 MMOL/L — SIGNIFICANT CHANGE UP (ref 1.15–1.29)
CA-I BLD-SCNC: 1.18 MMOL/L — SIGNIFICANT CHANGE UP (ref 1.15–1.29)
CALCIUM SERPL-MCNC: 9.5 MG/DL — SIGNIFICANT CHANGE UP (ref 8.4–10.5)
CALCIUM SERPL-MCNC: 9.5 MG/DL — SIGNIFICANT CHANGE UP (ref 8.4–10.5)
CHLORIDE SERPL-SCNC: 100 MMOL/L — SIGNIFICANT CHANGE UP (ref 98–107)
CHLORIDE SERPL-SCNC: 100 MMOL/L — SIGNIFICANT CHANGE UP (ref 98–107)
CO2 SERPL-SCNC: 24 MMOL/L — SIGNIFICANT CHANGE UP (ref 22–31)
CO2 SERPL-SCNC: 24 MMOL/L — SIGNIFICANT CHANGE UP (ref 22–31)
CREAT SERPL-MCNC: 0.5 MG/DL — SIGNIFICANT CHANGE UP (ref 0.5–1.3)
CREAT SERPL-MCNC: 0.5 MG/DL — SIGNIFICANT CHANGE UP (ref 0.5–1.3)
EGFR: 113 ML/MIN/1.73M2 — SIGNIFICANT CHANGE UP
EGFR: 113 ML/MIN/1.73M2 — SIGNIFICANT CHANGE UP
EOSINOPHIL # BLD AUTO: 0.02 K/UL — SIGNIFICANT CHANGE UP (ref 0–0.5)
EOSINOPHIL # BLD AUTO: 0.02 K/UL — SIGNIFICANT CHANGE UP (ref 0–0.5)
EOSINOPHIL NFR BLD AUTO: 0.3 % — SIGNIFICANT CHANGE UP (ref 0–6)
EOSINOPHIL NFR BLD AUTO: 0.3 % — SIGNIFICANT CHANGE UP (ref 0–6)
GLUCOSE SERPL-MCNC: 106 MG/DL — HIGH (ref 70–99)
GLUCOSE SERPL-MCNC: 106 MG/DL — HIGH (ref 70–99)
HCT VFR BLD CALC: 40.6 % — SIGNIFICANT CHANGE UP (ref 34.5–45)
HCT VFR BLD CALC: 40.6 % — SIGNIFICANT CHANGE UP (ref 34.5–45)
HGB BLD-MCNC: 13.6 G/DL — SIGNIFICANT CHANGE UP (ref 11.5–15.5)
HGB BLD-MCNC: 13.6 G/DL — SIGNIFICANT CHANGE UP (ref 11.5–15.5)
IANC: 5.29 K/UL — SIGNIFICANT CHANGE UP (ref 1.8–7.4)
IANC: 5.29 K/UL — SIGNIFICANT CHANGE UP (ref 1.8–7.4)
IMM GRANULOCYTES NFR BLD AUTO: 0.4 % — SIGNIFICANT CHANGE UP (ref 0–0.9)
IMM GRANULOCYTES NFR BLD AUTO: 0.4 % — SIGNIFICANT CHANGE UP (ref 0–0.9)
LYMPHOCYTES # BLD AUTO: 1.77 K/UL — SIGNIFICANT CHANGE UP (ref 1–3.3)
LYMPHOCYTES # BLD AUTO: 1.77 K/UL — SIGNIFICANT CHANGE UP (ref 1–3.3)
LYMPHOCYTES # BLD AUTO: 23.4 % — SIGNIFICANT CHANGE UP (ref 13–44)
LYMPHOCYTES # BLD AUTO: 23.4 % — SIGNIFICANT CHANGE UP (ref 13–44)
MAGNESIUM SERPL-MCNC: 1.7 MG/DL — SIGNIFICANT CHANGE UP (ref 1.6–2.6)
MAGNESIUM SERPL-MCNC: 1.7 MG/DL — SIGNIFICANT CHANGE UP (ref 1.6–2.6)
MCHC RBC-ENTMCNC: 31.1 PG — SIGNIFICANT CHANGE UP (ref 27–34)
MCHC RBC-ENTMCNC: 31.1 PG — SIGNIFICANT CHANGE UP (ref 27–34)
MCHC RBC-ENTMCNC: 33.5 GM/DL — SIGNIFICANT CHANGE UP (ref 32–36)
MCHC RBC-ENTMCNC: 33.5 GM/DL — SIGNIFICANT CHANGE UP (ref 32–36)
MCV RBC AUTO: 92.9 FL — SIGNIFICANT CHANGE UP (ref 80–100)
MCV RBC AUTO: 92.9 FL — SIGNIFICANT CHANGE UP (ref 80–100)
MONOCYTES # BLD AUTO: 0.41 K/UL — SIGNIFICANT CHANGE UP (ref 0–0.9)
MONOCYTES # BLD AUTO: 0.41 K/UL — SIGNIFICANT CHANGE UP (ref 0–0.9)
MONOCYTES NFR BLD AUTO: 5.4 % — SIGNIFICANT CHANGE UP (ref 2–14)
MONOCYTES NFR BLD AUTO: 5.4 % — SIGNIFICANT CHANGE UP (ref 2–14)
NEUTROPHILS # BLD AUTO: 5.29 K/UL — SIGNIFICANT CHANGE UP (ref 1.8–7.4)
NEUTROPHILS # BLD AUTO: 5.29 K/UL — SIGNIFICANT CHANGE UP (ref 1.8–7.4)
NEUTROPHILS NFR BLD AUTO: 70.1 % — SIGNIFICANT CHANGE UP (ref 43–77)
NEUTROPHILS NFR BLD AUTO: 70.1 % — SIGNIFICANT CHANGE UP (ref 43–77)
NRBC # BLD: 0 /100 WBCS — SIGNIFICANT CHANGE UP (ref 0–0)
NRBC # BLD: 0 /100 WBCS — SIGNIFICANT CHANGE UP (ref 0–0)
NRBC # FLD: 0 K/UL — SIGNIFICANT CHANGE UP (ref 0–0)
NRBC # FLD: 0 K/UL — SIGNIFICANT CHANGE UP (ref 0–0)
PHOSPHATE SERPL-MCNC: 3.2 MG/DL — SIGNIFICANT CHANGE UP (ref 2.5–4.5)
PHOSPHATE SERPL-MCNC: 3.2 MG/DL — SIGNIFICANT CHANGE UP (ref 2.5–4.5)
PLATELET # BLD AUTO: 406 K/UL — HIGH (ref 150–400)
PLATELET # BLD AUTO: 406 K/UL — HIGH (ref 150–400)
POTASSIUM SERPL-MCNC: 4.4 MMOL/L — SIGNIFICANT CHANGE UP (ref 3.5–5.3)
POTASSIUM SERPL-MCNC: 4.4 MMOL/L — SIGNIFICANT CHANGE UP (ref 3.5–5.3)
POTASSIUM SERPL-SCNC: 4.4 MMOL/L — SIGNIFICANT CHANGE UP (ref 3.5–5.3)
POTASSIUM SERPL-SCNC: 4.4 MMOL/L — SIGNIFICANT CHANGE UP (ref 3.5–5.3)
PROT SERPL-MCNC: 7.7 G/DL — SIGNIFICANT CHANGE UP (ref 6–8.3)
PROT SERPL-MCNC: 7.7 G/DL — SIGNIFICANT CHANGE UP (ref 6–8.3)
RBC # BLD: 4.37 M/UL — SIGNIFICANT CHANGE UP (ref 3.8–5.2)
RBC # BLD: 4.37 M/UL — SIGNIFICANT CHANGE UP (ref 3.8–5.2)
RBC # FLD: 12.2 % — SIGNIFICANT CHANGE UP (ref 10.3–14.5)
RBC # FLD: 12.2 % — SIGNIFICANT CHANGE UP (ref 10.3–14.5)
SODIUM SERPL-SCNC: 136 MMOL/L — SIGNIFICANT CHANGE UP (ref 135–145)
SODIUM SERPL-SCNC: 136 MMOL/L — SIGNIFICANT CHANGE UP (ref 135–145)
TSH SERPL-MCNC: 0.55 UIU/ML — SIGNIFICANT CHANGE UP (ref 0.27–4.2)
TSH SERPL-MCNC: 0.55 UIU/ML — SIGNIFICANT CHANGE UP (ref 0.27–4.2)
WBC # BLD: 7.55 K/UL — SIGNIFICANT CHANGE UP (ref 3.8–10.5)
WBC # BLD: 7.55 K/UL — SIGNIFICANT CHANGE UP (ref 3.8–10.5)
WBC # FLD AUTO: 7.55 K/UL — SIGNIFICANT CHANGE UP (ref 3.8–10.5)
WBC # FLD AUTO: 7.55 K/UL — SIGNIFICANT CHANGE UP (ref 3.8–10.5)

## 2023-11-06 PROCEDURE — 99284 EMERGENCY DEPT VISIT MOD MDM: CPT

## 2023-11-06 PROCEDURE — 93010 ELECTROCARDIOGRAM REPORT: CPT

## 2023-11-06 PROCEDURE — 71045 X-RAY EXAM CHEST 1 VIEW: CPT | Mod: 26

## 2023-11-06 RX ORDER — SODIUM CHLORIDE 9 MG/ML
1000 INJECTION, SOLUTION INTRAVENOUS ONCE
Refills: 0 | Status: COMPLETED | OUTPATIENT
Start: 2023-11-06 | End: 2023-11-06

## 2023-11-06 RX ADMIN — SODIUM CHLORIDE 1000 MILLILITER(S): 9 INJECTION, SOLUTION INTRAVENOUS at 18:11

## 2023-11-06 NOTE — ED PROVIDER NOTE - NSICDXFAMILYHX_GEN_ALL_CORE_FT
FAMILY HISTORY:  Father  Still living? Unknown  Family history of COPD (chronic obstructive pulmonary disease), Age at diagnosis: Age Unknown  Family history of diabetes mellitus (DM), Age at diagnosis: Age Unknown  Family history of eosinophilia, Age at diagnosis: Age Unknown  Family history of hypertension, Age at diagnosis: Age Unknown  Family history of osteopenia, Age at diagnosis: Age Unknown  Family history of smoking, Age at diagnosis: Age Unknown    Mother  Still living? Unknown  Family history of COPD (chronic obstructive pulmonary disease), Age at diagnosis: Age Unknown  Family history of smoking, Age at diagnosis: Age Unknown

## 2023-11-06 NOTE — ED ADULT NURSE NOTE - OBJECTIVE STATEMENT
Pt. presents to room 27 c/o episode of "SVT" at home states her HR was 120s-130s and she felt her heat beating very fast and she felt dizzy.  Pt. has PMHx of SVT last episode 10 years ago on biastolic for it. Pt. recently admitted for PNAhad chest tube placed for 4 days d/c'ed with PO antibiotics. LAC20G labs sent medicated per order.

## 2023-11-06 NOTE — ED ADULT NURSE REASSESSMENT NOTE - NS ED NURSE REASSESS COMMENT FT1
received pt A&Ox3 absent any distress or C/O pain. able to make needs known with care provided PRN. will continue with current plan of care

## 2023-11-06 NOTE — ED ADULT NURSE NOTE - AS PAIN REST
General Surgery    About the same    Liver workup underway    Agree her gallbladder findings on imaging are related to her underlying liver disease, not acute cholecystitis    No plans for intervention at this time    Continue workup of liver issues per GI    Will sign off for now    Please call with any questions    Electronically signed by Sommer Avendano MD on 2/3/2023 at 1:02 PM 0 (no pain/absence of nonverbal indicators of pain)

## 2023-11-06 NOTE — ED PROVIDER NOTE - CLINICAL SUMMARY MEDICAL DECISION MAKING FREE TEXT BOX
Riya Med Tox Fellow: likely an episode of resolved SVT in pt with history SVT. Plan for labs, fluids, monitor, tsh, lytes, CXR. If labs reassuring and pt without event on monitor for 2-3 hrs, likely pt safe for dc with outpt f/u

## 2023-11-06 NOTE — ED PROVIDER NOTE - ADDITIONAL NOTES AND INSTRUCTIONS:
Mg Stone received medical care on 11/6 at [facility]. She can return to work on 11/7 with no restrictions.

## 2023-11-06 NOTE — ED PROVIDER NOTE - OBJECTIVE STATEMENT
53F PMH SVT, recent admission for PNA with L sided chest tube dc'd on augmentin, presents to the ED with palpitations that began at about 11am today with a HR in 120s with some lightheadedness. Pt states that by time she got to the hospital she started feeling better. Pt states she's been taking her bystolic as directed. Denies any new fevers/chills, uri sxs, cough, chest pain/sob, abd pain, n/v, dark/bloody stools, urinary sxs, leg swelling/orthopnea. Denies drinking coffee in excess. Pt with some slight loose stools while on abx.  Pt states that she's making an apt with Cards with Dr. Funes soon.

## 2023-11-06 NOTE — ED PROVIDER NOTE - ATTENDING CONTRIBUTION TO CARE
53F p/w dizziness.  h/o SVT on bistolic, recent adm for L pna c/b empyema s/p CT and removal.  Today noted palpitation and lightheadedness.  By the time she got here feeling better.  No fever, cough, SOB.  No vomiting or abd pain.  mild loose stool no abd pain.  Likely volume depletion.  Plan check labs, EKG - SR at 78 no autumn no std no twi  qtc 490.  RBBB with aberrancy.  Unchanged from previous this october.  Pt feeling better.  Appears dry.  Rx fluids, check labs, if still feeling OK after ED observation, able to walk, OK for d/c home f/u established cardiologist.    VS:  unremarkable    GEN - NAD;   well appearing;   A+O x3   HEAD - NC/AT     ENT - PEERL, EOMI, mucous membranes  dry , no discharge      NECK: Neck supple, non-tender without lymphadenopathy, no masses, no JVD  PULM - CTA b/l,  symmetric breath sounds  COR -  normal heart sounds    ABD - , ND, NT, soft,  BACK - no CVA tenderness, nontender spine     EXTREMS - no edema, no deformity, warm and well perfused    SKIN - no rash    or bruising      NEUROLOGIC - alert, face symmetric, speech fluent, sensation nl, motor no focal deficit.

## 2023-11-06 NOTE — ED PROVIDER NOTE - NSFOLLOWUPINSTRUCTIONS_ED_ALL_ED_FT
You were evaluated in the hospital because you were experiencing palpitations in your chest. This resulted in lightheadedness. You were given fluids and your blood work was all normal. Your symptoms resolved and your heart rate and rhythm were monitored for hours in the emergency department. You had no recurrence of palpitations and were cleared to return home with outpatient cardiology follow-up.    You stated you were pending an appointment with Dr. Funes, please schedule this appointment at your earliest convenience.    Please see below for more information on your heart condition:    Supraventricular tachycardia (SVT) is a kind of abnormal heartbeat. It makes your heart beat very fast. This may last for a short time and then return to normal, or it may last longer.    A normal resting heartbeat is 60–100 times a minute. This condition can make your heart beat more than 150 times a minute. Times of having a fast heartbeat (episodes) can be scary, but they are usually not dangerous. In some cases, they may lead to heart failure if they:  Happen many times a day.  Last longer than a few seconds.    Contact a doctor if:  You have a fast heartbeat more often.  Times of having a fast heartbeat last longer than before.  Home treatments to slow down your heartbeat do not help.  You have new symptoms.    Get help right away if:  You have chest pain.  Your symptoms get worse.  You have trouble breathing.  Your heart beats very fast for more than 20 minutes.  You pass out.

## 2023-11-06 NOTE — ED PROVIDER NOTE - OTHER FINDINGS
SR at 78 no autumn no std no twi  qtc 490.  RBBB with aberrancy.  Unchanged from previous this october.

## 2023-11-06 NOTE — ED PROVIDER NOTE - CARE PROVIDER_API CALL
Isis Funes  Cardiovascular Disease  [address] [place] [address], [address] [place] [id number]  Phone: [phone]  Fax: [phone]  Established Patient  Follow Up Time: 7-10 Days

## 2023-11-06 NOTE — ED ADULT TRIAGE NOTE - CHIEF COMPLAINT QUOTE
Pt. states she was in SVT starting around 11am (HR in the 130s). c/o lightheadedness. History of several SVT episodes. Seen here recently for PNA. Denies cp, sob or palpitations.

## 2023-11-06 NOTE — ED PROVIDER NOTE - PHYSICAL EXAMINATION
GENERAL: no acute distress, non-toxic appearing  HEENT: normal conjunctiva, oral mucosa moist  CARDIAC: regular rate and regular rhythm, bp reassuring, 2+ distal pulses all extremities  PULM: clear to ascultation bilaterally with slight decreased breath sounds on L side, no appreciable crackles, rales, rhonchi, or wheezing, sats 98% on RA, no increased work of breathing  GI: abdomen nondistended, soft, nontender  : no suprapubic tenderness  NEURO: alert and oriented x 3, normal speech, moving all extremities without lateralization  MSK: no visible deformities, no peripheral edema, calf tenderness/redness/swelling  SKIN: no visible rashes  PSYCH: appropriate mood and affect

## 2023-11-07 ENCOUNTER — NON-APPOINTMENT (OUTPATIENT)
Age: 53
End: 2023-11-07

## 2023-11-09 ENCOUNTER — APPOINTMENT (OUTPATIENT)
Dept: PULMONOLOGY | Facility: CLINIC | Age: 53
End: 2023-11-09
Payer: MEDICAID

## 2023-11-09 VITALS — OXYGEN SATURATION: 98 % | SYSTOLIC BLOOD PRESSURE: 146 MMHG | DIASTOLIC BLOOD PRESSURE: 80 MMHG | HEART RATE: 76 BPM

## 2023-11-09 PROCEDURE — 71046 X-RAY EXAM CHEST 2 VIEWS: CPT

## 2023-11-09 PROCEDURE — 94729 DIFFUSING CAPACITY: CPT

## 2023-11-09 PROCEDURE — 99495 TRANSJ CARE MGMT MOD F2F 14D: CPT | Mod: 25

## 2023-11-09 PROCEDURE — 94727 GAS DIL/WSHOT DETER LNG VOL: CPT

## 2023-11-09 PROCEDURE — 94010 BREATHING CAPACITY TEST: CPT

## 2023-11-09 PROCEDURE — ZZZZZ: CPT

## 2023-11-22 LAB
CULTURE RESULTS: SIGNIFICANT CHANGE UP
CULTURE RESULTS: SIGNIFICANT CHANGE UP
SPECIMEN SOURCE: SIGNIFICANT CHANGE UP
SPECIMEN SOURCE: SIGNIFICANT CHANGE UP

## 2023-11-27 ENCOUNTER — APPOINTMENT (OUTPATIENT)
Dept: THORACIC SURGERY | Facility: CLINIC | Age: 53
End: 2023-11-27
Payer: MEDICAID

## 2023-11-27 VITALS
SYSTOLIC BLOOD PRESSURE: 138 MMHG | RESPIRATION RATE: 17 BRPM | HEART RATE: 61 BPM | DIASTOLIC BLOOD PRESSURE: 80 MMHG | HEIGHT: 72 IN | WEIGHT: 266 LBS | BODY MASS INDEX: 36.03 KG/M2 | OXYGEN SATURATION: 99 %

## 2023-11-27 PROCEDURE — 99213 OFFICE O/P EST LOW 20 MIN: CPT

## 2023-12-18 ENCOUNTER — APPOINTMENT (OUTPATIENT)
Dept: PULMONOLOGY | Facility: CLINIC | Age: 53
End: 2023-12-18
Payer: MEDICAID

## 2023-12-18 VITALS — OXYGEN SATURATION: 98 % | HEART RATE: 58 BPM | SYSTOLIC BLOOD PRESSURE: 121 MMHG | DIASTOLIC BLOOD PRESSURE: 74 MMHG

## 2023-12-18 DIAGNOSIS — Z23 ENCOUNTER FOR IMMUNIZATION: ICD-10-CM

## 2023-12-18 PROCEDURE — 99214 OFFICE O/P EST MOD 30 MIN: CPT | Mod: 25

## 2023-12-18 PROCEDURE — ZZZZZ: CPT

## 2023-12-18 PROCEDURE — 71046 X-RAY EXAM CHEST 2 VIEWS: CPT

## 2023-12-18 PROCEDURE — G0009: CPT

## 2023-12-18 PROCEDURE — 94010 BREATHING CAPACITY TEST: CPT

## 2023-12-18 PROCEDURE — 94727 GAS DIL/WSHOT DETER LNG VOL: CPT

## 2023-12-18 PROCEDURE — 94729 DIFFUSING CAPACITY: CPT

## 2023-12-18 PROCEDURE — 90677 PCV20 VACCINE IM: CPT

## 2023-12-18 NOTE — REASON FOR VISIT
[Follow-Up] : a follow-up visit [Asthma] : asthma [TextBox_44] : pleural effusion, post sepsis /pneumonia/empyema

## 2023-12-18 NOTE — PROCEDURE
[FreeTextEntry1] : Chest x-ray PA lateral 12/18/2023 status post sepsis pneumonia empyema left post pigtail catheter Cardiac size normal Discoid atelectasis left mid to lower lung zone Mild pleural fibrosis minimal Otherwise lungs are clear without parenchymal infiltrates pleural effusions or dominant pulmonary nodules Continued interval improvement of scarring noted on the prior chest x-ray of 11/9/2023  PFT 12/18/23 erin nl flow rates  lung volumes  nl  DLCO 92 % pred WNL interval improvement pulmonary physiology HGB 13.6   PFT November 9, 2023 post sepsis pneumonia empyema pigtail catheter left lung Mild reduction flow rates Mild obstructive ventilatory impairment Total capacity normal 85% of predicted Diffusion normal range 78% predicted Hemoglobin 13.6 Data comparison to flow rates available from June 30, 2023 demonstrated decline at the FEV1 and FVC Chest x-ray PA lateral November 9, 2023 Cardiac size is normal Right lung is clear Left lung demonstrates some residual scarring atelectatic changes pleural fibrosis with no evidence for any significant pleural effusion Compared to the chest x-ray prior to discharge 11/6/2023 done in the emergency room this demonstrates stability Chest x-ray at discharge on 10/26/2023 from the hospital the above-noted x-ray demonstrates significant interval clearing   Spirometry 6/30/23 Flow  rates nl  Mild OAD mild   decline  Spirometry May 15, 2023 Minimal obstructive ventilatory impairment No bronchodilator response at FEV1 Stable flow rates  Chest x-ray PA lateral May 15 2023 Cardiac size grossly normal Lung fields are clear No parenchymal infiltrate pleural effusions with dominant pulmonary nodules Soft tissue bony structures unremarkable Mediastinum unremarkable Impression clear lungs   Erin 4/6/23 Flow  rates nl without  decline Minimal OAD  Spirometry February 23, 2023 Mild obstructive ventilatory impairment Stable flow rates No decline in overall spirometric data  [place] 1/9/23 Mild OAD pos interval improvement at flow rates  Spirometry November 23, 2022 Ratio 79 No response to bronchodilator at the FEV1  PFT  8/24/22 Well preserved flow rates  Minimal OAD ratio 77   % lung volumes nl  DLCO 100 % nl range  HGB 12.2  PFT 5/13/22 Flow rates nl  ratio 77 Lung Volumes nl  DLCO 117 % pred HGB 13.9 Significant improvement flow rates  [place] No BD 3/30/22 Normal flow rates  PFT 2/16/22 Well preserved flow rates mild OAD  Lung Volumes nl DLCO 89 % pred  HGB 13.9  PFT 11/12/11 Flow rates nl TLC 92 % pred DLCO 94 % HGB 13.3  ERIN No BD 8/12/21 Mild OAD  stable pulmonary physiology  PFT June 16, 2021 Well-preserved flow rates Mild obstructive ventilatory impairment Normal lung volumes Air trapping with RV/TLC ratio 131% predicted Diffusion normal  Chest x-ray PA lateral  June 16, 2021 Normal cardiac size Hyper aeration increased retrosternal airspace Lung grossly clear lungs without parenchymal infiltrates pleural effusions or dominant pulmonary nodules  PCV 20 IM 12/18/23

## 2023-12-18 NOTE — HISTORY OF PRESENT ILLNESS
[Never] : never [TextBox_4] : Pulmonary evaluation s/p SVT  cardiology f/u Post hospital transition d/c Oct 27 2023 Data review Pneumonia  with sepsis  empyema s/p pig tail  cath x  4  days Labs CBC WBC 9.60 hemoglobin 11.7 hematocrit 36.5 platelets 535,000 likely reactive Serum electrolytes normal Creatinine 0.49 Serum calcium 9.3 Serum phosphorus 3.0 Serum magnesium 2.0 Status post chest tube removal Pigtail catheter was present No complicating pneumothorax There is loss of volume in the left lung postthoracotomy Noted culture of pleural fluid Streptococcus intermedius Status post tPA instillation for the empyema Augmentin treatment 875 twice daily x4 to 6 weeks We will need at office visit scheduling for a follow-up chest CT scan     CT chest October 19, 2023 Right lower lobe superior segment 2.5 cm nodule consolidative opacity Rule out infection Primary lung malignancy Moderate size loculated left pleural effusion Mediastinal pleural thickening Rule out malignancy Lingula and left lower lobe parenchymal opacification Atelectasis for pneumonia Mildly enlarged mediastinal nodes and left intramammary lymph node indeterminate  Follow-up chest CT scan  Reviewed admission note Patient was admitted to the emergency department with fever Reported lung exam with crackles  CBC WBC 18.37 Impression favor sepsis febrile tachycardic elevated lactate X-ray findings as reviewed above Patient treated with Flagyl and ceftriaxone  no decline Resp  status with cold weather change pos diet  weight loss No decline resp sxs post URI COVID negative occ mucous not foul smelling/ neg heme Longstanding history of asthma since the age of 24 She states that she has had dog exposure dating back to November 2020 she is young for asthma-like symptoms within allergy component She has had wheezing chest congestion cough Subsequently her Qvar that she had been previously treated with was discontinued and she was changed to Wixela continue dosing of Singulair with interval improvement of symptoms She states she believes in November she was treated with a course of steroids for short-term She is not actively wheezing No reported purulent sputum hemoptysis No fevers chills or sweats She denies shortness of breath dyspnea on exertion pleuritic chest pain She has no history of hospitalization or intubation for asthma Denies history of pneumonia tuberculosis latent tuberculosis interstitial lung disease obstructive sleep apnea pulmonary embolism She does report that she is a non-smoker but had significant secondhand tobacco smoke use Notes on beta-blocker for PVCs

## 2023-12-18 NOTE — CONSULT LETTER
[Dear  ___] : Dear  [unfilled], [Consult Letter:] : I had the pleasure of evaluating your patient, [unfilled]. [Please see my note below.] : Please see my note below. [Consult Closing:] : Thank you very much for allowing me to participate in the care of this patient.  If you have any questions, please do not hesitate to contact me. [Sincerely,] : Sincerely, [FreeTextEntry3] : Abhay Brown D.O., GEMMA\par   of Medicine\par  [facility] [employer]\par

## 2023-12-18 NOTE — DISCUSSION/SUMMARY
[FreeTextEntry1] : CXR PFT improvement CT CHEST f/u as per CTX request  Post hospital transition d/c Oct 27 2023 Data review Pneumonia  with sepsis  empyema s/p pig tail  cath x  4  days f/u short term interval f/u CT CHEST but weight mentation of mild continued healing as there is clearly demonstrable significant interval improvement from the discharge chest x-ray from the hospital  Chronic persistent asthma poor air quality addressed improvement/stable of pulmonary physiology seasonal allergy Atopy elevated serum IgE level Vascular history as noted above History of secondhand passive tobacco exposure Rule out atopy  allergenic component History of PVCs on beta-blocker but as long as she demonstrates continued stable pulmonary physiology without decline on above-noted medications would not discontinue beta-blocker at this time Recommendations Blood draw for asthma profile Food IgE serum IgE level eosinophil count noted  current dosing of Wixela  250-50 1 puff twice daily with instructions to rinse or Advair  Singulair 10 mg daily with food As needed short acting beta agonist.asthma Notify of any wheezing.  Notify if rescue inhaler is needed greater than 2-3 times in any week.  Avoid known triggers. states cardiac ST ? Stress ECHO  neg Flu up to  date Hep B  booster and  MMR booster and COVID Bivalent vaccine  changed to the Advair discus 250-51 puff twice daily with instructions to rinse

## 2023-12-22 ENCOUNTER — APPOINTMENT (OUTPATIENT)
Dept: CARDIOLOGY | Facility: CLINIC | Age: 53
End: 2023-12-22
Payer: MEDICAID

## 2023-12-22 ENCOUNTER — APPOINTMENT (OUTPATIENT)
Dept: ELECTROPHYSIOLOGY | Facility: CLINIC | Age: 53
End: 2023-12-22
Payer: MEDICAID

## 2023-12-22 VITALS
WEIGHT: 260 LBS | SYSTOLIC BLOOD PRESSURE: 130 MMHG | OXYGEN SATURATION: 100 % | HEART RATE: 63 BPM | DIASTOLIC BLOOD PRESSURE: 85 MMHG | BODY MASS INDEX: 35.21 KG/M2 | HEIGHT: 72 IN

## 2023-12-22 PROCEDURE — 99204 OFFICE O/P NEW MOD 45 MIN: CPT | Mod: 25

## 2023-12-22 PROCEDURE — 93000 ELECTROCARDIOGRAM COMPLETE: CPT

## 2023-12-22 RX ORDER — ALBUTEROL SULFATE 90 UG/1
108 (90 BASE) AEROSOL, METERED RESPIRATORY (INHALATION)
Qty: 1 | Refills: 1 | Status: DISCONTINUED | COMMUNITY
Start: 2022-05-17 | End: 2023-12-22

## 2023-12-22 RX ORDER — FLUOCINONIDE 0.5 MG/G
0.05 CREAM TOPICAL
Qty: 1 | Refills: 0 | Status: DISCONTINUED | COMMUNITY
Start: 2019-05-07 | End: 2023-12-22

## 2023-12-22 RX ORDER — NEBIVOLOL 5 MG/1
5 TABLET ORAL
Qty: 90 | Refills: 1 | Status: ACTIVE | COMMUNITY

## 2023-12-22 RX ORDER — RIZATRIPTAN BENZOATE 10 MG/1
10 TABLET ORAL
Refills: 0 | Status: ACTIVE | COMMUNITY
Start: 2018-03-31

## 2023-12-22 NOTE — ASSESSMENT
[FreeTextEntry1] : Ms. AKILAH MARQUEZ, 53 year old female, never smoker, w/ hx of Asthma, SVT, HNP, Migraine presented to the ED secondary to fever.  Diagnosed with Pleural Effusion in the ED. CT imaging revealed loculated left sided pleural effusion w/ associated left lower lobe consolidation. Met criteria for sepsis. Pulm/ CTS c/s for fruther evaluation. ID also on board. Pt had IR-guided pigtail catheter placement. Fluid studies consistent w/ empyema. ID advising CTS f/u. Pt s/p MIST x2. Fluid cx growing staph intermedius. Cytology was negative. Pigtail catheter has been removed. Follow up CXR unremarkable. Cleared from CTS perspective. ID recommendation also provided for abx therapy.   I have reviewed the patient's medical records and diagnostic images at time of this office consultation and have made the following recommendation: 1. Dr. Brown will order a new scan in one month. Pt to follow up with Dr. Brown, RTC PRN    I, Dr. YOUNG, DEANNA BRUNNERZABETH, personally performed the evaluation and management (E/M) services for this established patient who presents today with (a) new problem(s)/exacerbation of (an) existing condition(s).  That E/M includes conducting the examination, assessing all new/exacerbated conditions, and establishing a new plan of care.  Today, my ACP, Ivania Perez NP was here to observe my evaluation and management services for this new problem/exacerbated condition to be followed going forward.

## 2023-12-22 NOTE — HISTORY OF PRESENT ILLNESS
[FreeTextEntry1] : Ms. AKILAH MARQUEZ, 53 year old female, never smoker, w/ hx of Asthma, SVT, HNP, Migraine presented to the ED secondary to fever.  Diagnosed with Pleural Effusion in the ED. CT imaging revealed loculated left sided pleural effusion w/ associated left lower lobe consolidation. Met criteria for sepsis. Pulm/ CTS c/s for fruther evaluation. ID also on board. Pt had IR-guided pigtail catheter placement. Fluid studies consistent w/ empyema. ID advising CTS f/u. Pt s/p MIST x2. Fluid cx growing staph intermedius. Cytology was negative. Pigtail catheter has been removed. Follow up CXR unremarkable. Cleared from CTS perspective. ID recommendation also provided for abx therapy.   Pt presents today for follow up. Pt reports finished the Abx last Friday, denies SOB, cough or CP.

## 2023-12-22 NOTE — DISCUSSION/SUMMARY
[FreeTextEntry1] : Ms. AKILAH MARQUEZ, 53 year old female, never smoker, w/ hx of Asthma, SVT, HNP, Migraine presented to the ED secondary to fever.  Diagnosed with Pleural Effusion in the ED. CT imaging revealed loculated left sided pleural effusion w/ associated left lower lobe consolidation. Met criteria for sepsis. Pulm/ CTS c/s for fruther evaluation. ID also on board. Pt had IR-guided pigtail catheter placement. Fluid studies consistent w/ empyema. ID advising CTS f/u. Pt s/p MIST x2. Fluid cx growing staph intermedius. Cytology was negative. Pigtail catheter has been removed. Follow up CXR unremarkable. Cleared from CTS perspective. ID recommendation also provided for abx therapy.   - BP stable - Encouraged the patient to monitor blood pressure at home, keep a log, and report results back to us for evaluation. Based on results, we will adjust the regimen as necessary. - has had SVT in the past and has been well controlled with 5 mg of Bystolic has been keeping it under control - was discharged from the hospital 10/2023 when she was hospitalized for the PNA - will place a cardiopatch to assess for arrhythmias - will refer for an echocardiogram for the dysonea  - Encouraged patient to continue healthy exercise and eating habits, focusing on a Mediterranean style of eating and aiming for the recommended 150 minutes per week of moderate physical activity. - Encouraged the patient to find healthy outlets and coping mechanisms to help manage stress, such as physical activity/exercise, reducing workload if possible, spending time with family and friends, engaging in an enjoyable hobby, or using meditation or mindfulness techniques.      [EKG obtained to assist in diagnosis and management of assessed problem(s)] : EKG obtained to assist in diagnosis and management of assessed problem(s)

## 2023-12-27 ENCOUNTER — NON-APPOINTMENT (OUTPATIENT)
Age: 53
End: 2023-12-27

## 2024-01-03 ENCOUNTER — APPOINTMENT (OUTPATIENT)
Dept: ELECTROPHYSIOLOGY | Facility: CLINIC | Age: 54
End: 2024-01-03
Payer: MEDICAID

## 2024-01-03 PROCEDURE — 93248 EXT ECG>7D<15D REV&INTERPJ: CPT | Mod: NC

## 2024-01-12 ENCOUNTER — OUTPATIENT (OUTPATIENT)
Dept: OUTPATIENT SERVICES | Facility: HOSPITAL | Age: 54
LOS: 1 days | End: 2024-01-12
Payer: MEDICAID

## 2024-01-12 ENCOUNTER — APPOINTMENT (OUTPATIENT)
Dept: CT IMAGING | Facility: IMAGING CENTER | Age: 54
End: 2024-01-12
Payer: MEDICAID

## 2024-01-12 DIAGNOSIS — N60.02 SOLITARY CYST OF LEFT BREAST: Chronic | ICD-10-CM

## 2024-01-12 DIAGNOSIS — I83.019 VARICOSE VEINS OF RIGHT LOWER EXTREMITY WITH ULCER OF UNSPECIFIED SITE: Chronic | ICD-10-CM

## 2024-01-12 DIAGNOSIS — Z90.49 ACQUIRED ABSENCE OF OTHER SPECIFIED PARTS OF DIGESTIVE TRACT: Chronic | ICD-10-CM

## 2024-01-12 DIAGNOSIS — Z98.890 OTHER SPECIFIED POSTPROCEDURAL STATES: Chronic | ICD-10-CM

## 2024-01-12 DIAGNOSIS — Z00.8 ENCOUNTER FOR OTHER GENERAL EXAMINATION: ICD-10-CM

## 2024-01-12 DIAGNOSIS — J86.9 PYOTHORAX WITHOUT FISTULA: ICD-10-CM

## 2024-01-12 PROCEDURE — 71250 CT THORAX DX C-: CPT | Mod: 26

## 2024-01-12 PROCEDURE — 71250 CT THORAX DX C-: CPT

## 2024-01-18 ENCOUNTER — NON-APPOINTMENT (OUTPATIENT)
Age: 54
End: 2024-01-18

## 2024-01-19 PROCEDURE — 93244 EXT ECG>48HR<7D REV&INTERPJ: CPT

## 2024-01-29 ENCOUNTER — APPOINTMENT (OUTPATIENT)
Dept: PULMONOLOGY | Facility: CLINIC | Age: 54
End: 2024-01-29
Payer: MEDICAID

## 2024-01-29 VITALS — DIASTOLIC BLOOD PRESSURE: 89 MMHG | HEART RATE: 47 BPM | OXYGEN SATURATION: 99 % | SYSTOLIC BLOOD PRESSURE: 138 MMHG

## 2024-01-29 DIAGNOSIS — J15.9 UNSPECIFIED BACTERIAL PNEUMONIA: ICD-10-CM

## 2024-01-29 PROCEDURE — 94060 EVALUATION OF WHEEZING: CPT

## 2024-01-29 PROCEDURE — 99214 OFFICE O/P EST MOD 30 MIN: CPT | Mod: 25

## 2024-01-29 NOTE — DISCUSSION/SUMMARY
[FreeTextEntry1] : CXR PFT improvement CT CHEST f/u as per CTX request  interval resolution of previously seen left effusion and pneumonia Minimal to mild bilateral mid to lower lung linear subsegmental atelectasis with some associated left lower lobe hemithorax pleural thickening post empyema stable  asthma Post hospital transition d/c Oct 27 2023 Data review Pneumonia  with sepsis  empyema s/p pig tail  cath x  4  days f/u short term interval f/u CT CHEST but weight mentation of mild continued healing as there is clearly demonstrable significant interval improvement from the discharge chest x-ray from the hospital  Chronic persistent asthma poor air quality addressed improvement/stable of pulmonary physiology seasonal allergy Atopy elevated serum IgE level Vascular history as noted above History of secondhand passive tobacco exposure Rule out atopy  allergenic component History of PVCs on beta-blocker but as long as she demonstrates continued stable pulmonary physiology without decline on above-noted medications would not discontinue beta-blocker at this time Recommendations Blood draw for asthma profile Food IgE serum IgE level eosinophil count noted  current dosing of Wixela  250-50 1 puff twice daily with instructions to rinse or Advair  Singulair 10 mg daily with food As needed short acting beta agonist.asthma Notify of any wheezing.  Notify if rescue inhaler is needed greater than 2-3 times in any week.  Avoid known triggers. states cardiac ST ? Stress ECHO  neg Flu up to  date Hep B  booster and  MMR booster and COVID Bivalent vaccine  changed to the Advair discus 250-51 puff twice daily with instructions to rinse

## 2024-01-29 NOTE — HISTORY OF PRESENT ILLNESS
[Never] : never [TextBox_4] : Pulmonary evaluation still with pain  left laterla chest  wall site CT s/p SVT  cardiology f/u Post hospital transition d/c Oct 27 2023 Data review Pneumonia  with sepsis  empyema s/p pig tail  cath x  4  days Labs CBC WBC 9.60 hemoglobin 11.7 hematocrit 36.5 platelets 535,000 likely reactive Serum electrolytes normal Creatinine 0.49 Serum calcium 9.3 Serum phosphorus 3.0 Serum magnesium 2.0 Status post chest tube removal Pigtail catheter was present No complicating pneumothorax There is loss of volume in the left lung postthoracotomy Noted culture of pleural fluid Streptococcus intermedius Status post tPA instillation for the empyema Augmentin treatment 875 twice daily x4 to 6 weeks We will need at office visit scheduling for a follow-up chest CT scan     CT chest October 19, 2023 Right lower lobe superior segment 2.5 cm nodule consolidative opacity Rule out infection Primary lung malignancy Moderate size loculated left pleural effusion Mediastinal pleural thickening Rule out malignancy Lingula and left lower lobe parenchymal opacification Atelectasis for pneumonia Mildly enlarged mediastinal nodes and left intramammary lymph node indeterminate  Follow-up chest CT scan  Reviewed admission note Patient was admitted to the emergency department with fever Reported lung exam with crackles  CBC WBC 18.37 Impression favor sepsis febrile tachycardic elevated lactate X-ray findings as reviewed above Patient treated with Flagyl and ceftriaxone  no decline Resp  status with cold weather change pos diet  weight loss No decline resp sxs post URI COVID negative occ mucous not foul smelling/ neg heme Longstanding history of asthma since the age of 24 She states that she has had dog exposure dating back to November 2020 she is young for asthma-like symptoms within allergy component She has had wheezing chest congestion cough Subsequently her Qvar that she had been previously treated with was discontinued and she was changed to Wixela continue dosing of Singulair with interval improvement of symptoms She states she believes in November she was treated with a course of steroids for short-term She is not actively wheezing No reported purulent sputum hemoptysis No fevers chills or sweats She denies shortness of breath dyspnea on exertion pleuritic chest pain She has no history of hospitalization or intubation for asthma Denies history of pneumonia tuberculosis latent tuberculosis interstitial lung disease obstructive sleep apnea pulmonary embolism She does report that she is a non-smoker but had significant secondhand tobacco smoke use Notes on beta-blocker for PVCs

## 2024-01-29 NOTE — PROCEDURE
[FreeTextEntry1] : January 12, 2024 CT chest Follow-up to hospitalization CT abnormalities including empyema sepsis Overall impression Interval resolution of the previously left pleural effusion and pneumonia Subcentimeter small mediastinal lymph nodes overall interval decrease in size compared to the study of October 19, 2023 No enlarged axillary mediastinal or hilar lymph nodes Mild biapical scarring unchanged Interval resolution of the previously seen left lower lobe consolidation with mild residual bilateral mid to lower lung zone linear subsegmental atelectatic changes Interval resolution of previously seen right lower lobe nodule consolidation with minimal residual linear opacity consistent with atelectasis  Overall impression interval resolution of previously seen left effusion and pneumonia Minimal to mild bilateral mid to lower lung linear subsegmental atelectasis with some associated left lower lobe hemithorax pleural thickening post empyema Overall clinical improvement No further intervention  ERIN 1/29/24  flow  rates nl  ratio 78 stable  Chest x-ray PA lateral 12/18/2023 status post sepsis pneumonia empyema left post pigtail catheter Cardiac size normal Discoid atelectasis left mid to lower lung zone Mild pleural fibrosis minimal Otherwise lungs are clear without parenchymal infiltrates pleural effusions or dominant pulmonary nodules Continued interval improvement of scarring noted on the prior chest x-ray of 11/9/2023  PFT 12/18/23 erin nl flow rates  lung volumes  nl  DLCO 92 % pred WNL interval improvement pulmonary physiology HGB 13.6   PFT November 9, 2023 post sepsis pneumonia empyema pigtail catheter left lung Mild reduction flow rates Mild obstructive ventilatory impairment Total capacity normal 85% of predicted Diffusion normal range 78% predicted Hemoglobin 13.6 Data comparison to flow rates available from June 30, 2023 demonstrated decline at the FEV1 and FVC Chest x-ray PA lateral November 9, 2023 Cardiac size is normal Right lung is clear Left lung demonstrates some residual scarring atelectatic changes pleural fibrosis with no evidence for any significant pleural effusion Compared to the chest x-ray prior to discharge 11/6/2023 done in the emergency room this demonstrates stability Chest x-ray at discharge on 10/26/2023 from the hospital the above-noted x-ray demonstrates significant interval clearing  Spirometry 6/30/23 Flow  rates nl  Mild OAD mild   decline  Spirometry May 15, 2023 Minimal obstructive ventilatory impairment No bronchodilator response at FEV1 Stable flow rates  Chest x-ray PA lateral May 15 2023 Cardiac size grossly normal Lung fields are clear No parenchymal infiltrate pleural effusions with dominant pulmonary nodules Soft tissue bony structures unremarkable Mediastinum unremarkable Impression clear lungs   Erin 4/6/23 Flow  rates nl without  decline Minimal OAD  Spirometry February 23, 2023 Mild obstructive ventilatory impairment Stable flow rates No decline in overall spirometric data  Erin 1/9/23 Mild OAD pos interval improvement at flow rates  Spirometry November 23, 2022 Ratio 79 No response to bronchodilator at the FEV1  PFT  8/24/22 Well preserved flow rates  Minimal OAD ratio 77   % lung volumes nl  DLCO 100 % nl range  HGB 12.2  PFT 5/13/22 Flow rates nl  ratio 77 Lung Volumes nl  DLCO 117 % pred HGB 13.9 Significant improvement flow rates  Erin No BD 3/30/22 Normal flow rates  PFT 2/16/22 Well preserved flow rates mild OAD  Lung Volumes nl DLCO 89 % pred  HGB 13.9  PFT 11/12/11 Flow rates nl TLC 92 % pred DLCO 94 % HGB 13.3  ERIN No BD 8/12/21 Mild OAD  stable pulmonary physiology  PFT June 16, 2021 Well-preserved flow rates Mild obstructive ventilatory impairment Normal lung volumes Air trapping with RV/TLC ratio 131% predicted Diffusion normal  Chest x-ray PA lateral  June 16, 2021 Normal cardiac size Hyper aeration increased retrosternal airspace Lung grossly clear lungs without parenchymal infiltrates pleural effusions or dominant pulmonary nodules  PCV 20 IM 12/18/23

## 2024-02-26 ENCOUNTER — APPOINTMENT (OUTPATIENT)
Dept: PULMONOLOGY | Facility: CLINIC | Age: 54
End: 2024-02-26
Payer: MEDICAID

## 2024-02-26 VITALS
BODY MASS INDEX: 35.21 KG/M2 | RESPIRATION RATE: 15 BRPM | DIASTOLIC BLOOD PRESSURE: 81 MMHG | TEMPERATURE: 98 F | OXYGEN SATURATION: 99 % | HEIGHT: 72 IN | HEART RATE: 59 BPM | SYSTOLIC BLOOD PRESSURE: 138 MMHG | WEIGHT: 260 LBS

## 2024-02-26 DIAGNOSIS — J86.9 PYOTHORAX W/OUT FISTULA: ICD-10-CM

## 2024-02-26 DIAGNOSIS — J90 PLEURAL EFFUSION, NOT ELSEWHERE CLASSIFIED: ICD-10-CM

## 2024-02-26 PROCEDURE — 94727 GAS DIL/WSHOT DETER LNG VOL: CPT

## 2024-02-26 PROCEDURE — 94729 DIFFUSING CAPACITY: CPT

## 2024-02-26 PROCEDURE — 94010 BREATHING CAPACITY TEST: CPT

## 2024-02-26 PROCEDURE — ZZZZZ: CPT

## 2024-02-26 PROCEDURE — 99214 OFFICE O/P EST MOD 30 MIN: CPT | Mod: 25

## 2024-02-26 NOTE — HISTORY OF PRESENT ILLNESS
[Never] : never [TextBox_4] : Pulmonary evaluation still with pain  left laterla chest  wall site CT almost resolved States following a COVID-vaccine booster February 5, 2024 did develop some post COVID URI symptoms was seen by primary care physician Dr. Ramirez Completed a course of Augmentin At present is feeling better  s/p SVT  cardiology f/u Post hospital transition d/c Oct 27 2023 Data review Pneumonia  with sepsis  empyema s/p pig tail  cath x  4  days Labs CBC WBC 9.60 hemoglobin 11.7 hematocrit 36.5 platelets 535,000 likely reactive Serum electrolytes normal Creatinine 0.49 Serum calcium 9.3 Serum phosphorus 3.0 Serum magnesium 2.0 Status post chest tube removal Pigtail catheter was present No complicating pneumothorax There is loss of volume in the left lung postthoracotomy Noted culture of pleural fluid Streptococcus intermedius Status post tPA instillation for the empyema Augmentin treatment 875 twice daily x4 to 6 weeks We will need at office visit scheduling for a follow-up chest CT scan     CT chest October 19, 2023 Right lower lobe superior segment 2.5 cm nodule consolidative opacity Rule out infection Primary lung malignancy Moderate size loculated left pleural effusion Mediastinal pleural thickening Rule out malignancy Lingula and left lower lobe parenchymal opacification Atelectasis for pneumonia Mildly enlarged mediastinal nodes and left intramammary lymph node indeterminate  Follow-up chest CT scan  Reviewed admission note Patient was admitted to the emergency department with fever Reported lung exam with crackles  CBC WBC 18.37 Impression favor sepsis febrile tachycardic elevated lactate X-ray findings as reviewed above Patient treated with Flagyl and ceftriaxone  no decline Resp  status with cold weather change pos diet  weight loss No decline resp sxs post URI COVID negative occ mucous not foul smelling/ neg heme Longstanding history of asthma since the age of 24 She states that she has had dog exposure dating back to November 2020 she is young for asthma-like symptoms within allergy component She has had wheezing chest congestion cough Subsequently her Qvar that she had been previously treated with was discontinued and she was changed to Wixela continue dosing of Singulair with interval improvement of symptoms She states she believes in November she was treated with a course of steroids for short-term She is not actively wheezing No reported purulent sputum hemoptysis No fevers chills or sweats She denies shortness of breath dyspnea on exertion pleuritic chest pain She has no history of hospitalization or intubation for asthma Denies history of pneumonia tuberculosis latent tuberculosis interstitial lung disease obstructive sleep apnea pulmonary embolism She does report that she is a non-smoker but had significant secondhand tobacco smoke use Notes on beta-blocker for PVCs

## 2024-02-26 NOTE — PHYSICAL EXAM
[Normal Oropharynx] : normal oropharynx [No Acute Distress] : no acute distress [II] : Mallampati Class: II [Normal Appearance] : normal appearance [Supple] : supple [No Neck Mass] : no neck mass [No JVD] : no jvd [Normal Rate/Rhythm] : normal rate/rhythm [Normal PMI] : normal pmi [Normal Pulses] : normal pulses [Normal S1, S2] : normal s1, s2 [No Murmurs] : no murmurs [No Rubs] : no rubs [No Gallops] : no gallops [No Resp Distress] : no resp distress [No Acc Muscle Use] : no acc muscle use [Normal Palpation] : normal palpation [Normal Rhythm and Effort] : normal rhythm and effort [Clear to Auscultation Bilaterally] : clear to auscultation bilaterally [No Abnormalities] : no abnormalities [Benign] : benign [Soft] : soft [Not Tender] : not tender [No HSM] : no hsm [Normal Bowel Sounds] : normal bowel sounds [Normal Gait] : normal gait [No Clubbing] : no clubbing [No Cyanosis] : no cyanosis [FROM] : FROM [No Edema] : no edema [No Focal Deficits] : no focal deficits [Normal Color/ Pigmentation] : normal color/ pigmentation [Oriented x3] : oriented x3 [Normal Mood] : normal mood [Normal Affect] : normal affect [TextBox_2] : Overweight

## 2024-02-26 NOTE — PROCEDURE
[FreeTextEntry1] : PFT February 26, 2024 Well-preserved flow rates Mild obstructive pattern Ratio 70 Mammograms are normal No air-trapping Diffusion normal range 94% predicted Hemoglobin 13.6 Overall significant improvement of pulmonary physiology  January 12, 2024 CT chest Follow-up to hospitalization CT abnormalities including empyema sepsis Overall impression Interval resolution of the previously left pleural effusion and pneumonia Subcentimeter small mediastinal lymph nodes overall interval decrease in size compared to the study of October 19, 2023 No enlarged axillary mediastinal or hilar lymph nodes Mild biapical scarring unchanged Interval resolution of the previously seen left lower lobe consolidation with mild residual bilateral mid to lower lung zone linear subsegmental atelectatic changes Interval resolution of previously seen right lower lobe nodule consolidation with minimal residual linear opacity consistent with atelectasis  Overall impression interval resolution of previously seen left effusion and pneumonia Minimal to mild bilateral mid to lower lung linear subsegmental atelectasis with some associated left lower lobe hemithorax pleural thickening post empyema Overall clinical improvement No further intervention  ERIN 1/29/24  flow  rates nl  ratio 78 stable  Chest x-ray PA lateral 12/18/2023 status post sepsis pneumonia empyema left post pigtail catheter Cardiac size normal Discoid atelectasis left mid to lower lung zone Mild pleural fibrosis minimal Otherwise lungs are clear without parenchymal infiltrates pleural effusions or dominant pulmonary nodules Continued interval improvement of scarring noted on the prior chest x-ray of 11/9/2023  PFT 12/18/23 erin nl flow rates  lung volumes  nl  DLCO 92 % pred WNL interval improvement pulmonary physiology HGB 13.6   PFT November 9, 2023 post sepsis pneumonia empyema pigtail catheter left lung Mild reduction flow rates Mild obstructive ventilatory impairment Total capacity normal 85% of predicted Diffusion normal range 78% predicted Hemoglobin 13.6 Data comparison to flow rates available from June 30, 2023 demonstrated decline at the FEV1 and FVC Chest x-ray PA lateral November 9, 2023 Cardiac size is normal Right lung is clear Left lung demonstrates some residual scarring atelectatic changes pleural fibrosis with no evidence for any significant pleural effusion Compared to the chest x-ray prior to discharge 11/6/2023 done in the emergency room this demonstrates stability Chest x-ray at discharge on 10/26/2023 from the hospital the above-noted x-ray demonstrates significant interval clearing  Spirometry 6/30/23 Flow  rates nl  Mild OAD mild   decline  Spirometry May 15, 2023 Minimal obstructive ventilatory impairment No bronchodilator response at FEV1 Stable flow rates  Chest x-ray PA lateral May 15 2023 Cardiac size grossly normal Lung fields are clear No parenchymal infiltrate pleural effusions with dominant pulmonary nodules Soft tissue bony structures unremarkable Mediastinum unremarkable Impression clear lungs   Erin 4/6/23 Flow  rates nl without  decline Minimal OAD  Spirometry February 23, 2023 Mild obstructive ventilatory impairment Stable flow rates No decline in overall spirometric data  Erin 1/9/23 Mild OAD pos interval improvement at flow rates  Spirometry November 23, 2022 Ratio 79 No response to bronchodilator at the FEV1  PFT  8/24/22 Well preserved flow rates  Minimal OAD ratio 77   % lung volumes nl  DLCO 100 % nl range  HGB 12.2  PFT 5/13/22 Flow rates nl  ratio 77 Lung Volumes nl  DLCO 117 % pred HGB 13.9 Significant improvement flow rates  Osborne No BD 3/30/22 Normal flow rates  PFT 2/16/22 Well preserved flow rates mild OAD  Lung Volumes nl DLCO 89 % pred  HGB 13.9  PFT 11/12/11 Flow rates nl TLC 92 % pred DLCO 94 % HGB 13.3  ERIN No BD 8/12/21 Mild OAD  stable pulmonary physiology  PFT June 16, 2021 Well-preserved flow rates Mild obstructive ventilatory impairment Normal lung volumes Air trapping with RV/TLC ratio 131% predicted Diffusion normal  Chest x-ray PA lateral  June 16, 2021 Normal cardiac size Hyper aeration increased retrosternal airspace Lung grossly clear lungs without parenchymal infiltrates pleural effusions or dominant pulmonary nodules  PCV 20 IM 12/18/23

## 2024-02-26 NOTE — CONSULT LETTER
[Dear  ___] : Dear  [unfilled], [Consult Letter:] : I had the pleasure of evaluating your patient, [unfilled]. [Please see my note below.] : Please see my note below. [Consult Closing:] : Thank you very much for allowing me to participate in the care of this patient.  If you have any questions, please do not hesitate to contact me. [Sincerely,] : Sincerely, [FreeTextEntry3] : Abhay Brown D.O., GEMMA\par   of Medicine\par  [employer]\par

## 2024-02-26 NOTE — REVIEW OF SYSTEMS
[Recent Wt Loss (___ Lbs)] : ~T recent [unfilled] lb weight loss [Negative] : Endocrine [TextBox_44] : History of PVCs on beta-blocker [TextBox_30] : HPI [TextBox_57] : ? Dogs [TextBox_94] : Cervical spine degeneration [TextBox_104] : Hx pyogenic granuloma [TextBox_122] : Migraine headache [TextBox_151] : Schamberg disease, varicose veins

## 2024-03-14 ENCOUNTER — OUTPATIENT (OUTPATIENT)
Dept: OUTPATIENT SERVICES | Facility: HOSPITAL | Age: 54
LOS: 1 days | End: 2024-03-14
Payer: MEDICAID

## 2024-03-14 ENCOUNTER — RESULT REVIEW (OUTPATIENT)
Age: 54
End: 2024-03-14

## 2024-03-14 ENCOUNTER — APPOINTMENT (OUTPATIENT)
Dept: CV DIAGNOSITCS | Facility: HOSPITAL | Age: 54
End: 2024-03-14

## 2024-03-14 DIAGNOSIS — Z98.890 OTHER SPECIFIED POSTPROCEDURAL STATES: Chronic | ICD-10-CM

## 2024-03-14 DIAGNOSIS — N60.02 SOLITARY CYST OF LEFT BREAST: Chronic | ICD-10-CM

## 2024-03-14 DIAGNOSIS — I47.10 SUPRAVENTRICULAR TACHYCARDIA, UNSPECIFIED: ICD-10-CM

## 2024-03-14 DIAGNOSIS — I83.019 VARICOSE VEINS OF RIGHT LOWER EXTREMITY WITH ULCER OF UNSPECIFIED SITE: Chronic | ICD-10-CM

## 2024-03-14 DIAGNOSIS — Z90.49 ACQUIRED ABSENCE OF OTHER SPECIFIED PARTS OF DIGESTIVE TRACT: Chronic | ICD-10-CM

## 2024-03-14 PROCEDURE — 93306 TTE W/DOPPLER COMPLETE: CPT

## 2024-03-14 PROCEDURE — 93306 TTE W/DOPPLER COMPLETE: CPT | Mod: 26

## 2024-03-14 PROCEDURE — 76376 3D RENDER W/INTRP POSTPROCES: CPT

## 2024-03-14 PROCEDURE — 93356 MYOCRD STRAIN IMG SPCKL TRCK: CPT

## 2024-03-14 PROCEDURE — 76376 3D RENDER W/INTRP POSTPROCES: CPT | Mod: 26

## 2024-03-25 ENCOUNTER — APPOINTMENT (OUTPATIENT)
Dept: CARDIOLOGY | Facility: CLINIC | Age: 54
End: 2024-03-25
Payer: MEDICAID

## 2024-03-25 VITALS
HEART RATE: 57 BPM | OXYGEN SATURATION: 100 % | WEIGHT: 264 LBS | BODY MASS INDEX: 35.76 KG/M2 | SYSTOLIC BLOOD PRESSURE: 145 MMHG | HEIGHT: 72 IN | DIASTOLIC BLOOD PRESSURE: 86 MMHG

## 2024-03-25 DIAGNOSIS — R03.0 ELEVATED BLOOD-PRESSURE READING, W/OUT DIAGNOSIS OF HYPERTENSION: ICD-10-CM

## 2024-03-25 DIAGNOSIS — I47.10 SUPRAVENTRICULAR TACHYCARDIA, UNSPECIFIED: ICD-10-CM

## 2024-03-25 DIAGNOSIS — R00.0 TACHYCARDIA, UNSPECIFIED: ICD-10-CM

## 2024-03-25 DIAGNOSIS — R06.09 OTHER FORMS OF DYSPNEA: ICD-10-CM

## 2024-03-25 PROCEDURE — 93000 ELECTROCARDIOGRAM COMPLETE: CPT

## 2024-03-25 PROCEDURE — 99214 OFFICE O/P EST MOD 30 MIN: CPT | Mod: 25

## 2024-03-25 RX ORDER — ELECTROLYTES/DEXTROSE
200 SOLUTION, ORAL ORAL
Refills: 0 | Status: DISCONTINUED | COMMUNITY
End: 2024-03-25

## 2024-03-27 NOTE — HISTORY OF PRESENT ILLNESS
[FreeTextEntry1] : Ms. AKILAH MARQUEZ, 53 year old female seen today for 3 month follow up  with history of cardiovascular risk factors. Initially seen to [facility] 2023 after hospitalization for PNA, right pleural effusion s/p drainage, dx empyema, completed full course antibiotics.  Hospital course remarkable for one day readmit for SVT, currenlty controlled on Bystolic.  Past health history other wise remarkable for COVID  2023, Asthma, Migraine, obesity .  + FH : mother afib,  age 79, father: DM, HTN, HPL, 83, living  Seen for follow up afib hx, Holter with low AF burden, no AC at this time,  CHADS VaSc -1 ( female ).  Currently:, denies SOB, CP, edema, dizziness, palpitations BP today: 145/86 , HR 60 NSR   EKG:  NSR , RBBB, QTc 469 Current medications;  Bystolic 5 mg daily HCTZ 12.5 mg daily  Recent labwork/ diagnostics:  PMD to do labwork 2 weeks.  diagnostics: 3/14/2024:  Myocardia strain imaging, TTE:  CONCLUSIONS:  1. Left ventricular cavity is mildly dilated. Left ventricular wall thickness is normal. Left ventricular systolic function is normal with an ejection fraction of 61 % by 3D. There are no regional wall motion abnormalities seen.  2. Left ventricular global longitudinal strain is -20.4 % which is normal (< -18%). Images were acquired on a [employer] ultrasound system and processed using QR Artist strain analysis software with a heart rate of 67 bpm and a blood pressure of 160/80 mmHg.  3. Normal left ventricular diastolic function.  4. Mildly enlarged right ventricular cavity size and normal systolic function.  5. Mild mitral regurgitation at a blood pressure of 160/80 mmHg.  6. No pericardial effusion seen.  7. No prior echocardiogram is available for comparison.  8. Right ventricular free wall strain is --23 %.  9. There is normal LV mass and normal geometry.  Claudio,3,2024Holter :  brief < 1% AF burden, (patient asymptomatic). Patient remains on Bystolic for PVC / SVT suppression. Stress test: last in , Cruz Score 5-6

## 2024-03-27 NOTE — DISCUSSION/SUMMARY
[EKG obtained to assist in diagnosis and management of assessed problem(s)] : EKG obtained to assist in diagnosis and management of assessed problem(s) [FreeTextEntry1] : In [facility], Ms. AKILAH MARQUEZ, is a 53 year old female  with history of cardiovascular risk factors including SVT, afib, obesity, HTN.  Seen today for follow up SVT, HTN, recent Holter monitor with low afib burden, Echocardiogram, no significant valvular heart disease , normal EF. Last stress test limited due to knee pain in , duke score 5-6.   Past health history other wise remarkable for COVID  2023, Asthma, Migraine, obesity .  + FH : mother afib,  age 79, father: DM, HTN, HPL, 83, living - ECG with no acute ischemic changes (stable from prior), RBBB, home BP monitoring well controlled with systolic  range.    Bystolic 5 mg daily HCTZ 12.5 mg daily  #HTN - Encouraged the patient to monitor blood pressure at home, keep a log, and report results back to us for evaluation if elevated. Based on results, we will adjust the regimen as necessary. -Continues HCTZ 12. 5 mg daily  #SVT - has had SVT in the past and has been well controlled with 5 mg of Bystolic has been keeping it under control - cardiopatch  with no sig arrythmias, off AC -TTE with no regional wall abnormalities  # Cardiovascular risk factors -Calcium Score ( ordered), unable to exercis on treadmill due to knee pain in past -consider ASA 81 mg daily pending Calcium score -f/u labwork 2 weeks to be sent ( PMD) -f/u 6 months - Encouraged patient to continue healthy exercise and eating habits, focusing on a Mediterranean style of eating and aiming for the recommended 150 minutes per week of moderate physical activity. - Encouraged the patient to find healthy outlets and coping mechanisms to help manage stress, such as physical activity/exercise, reducing workload if possible, spending time with family and friends, engaging in an enjoyable hobby, or using meditation or mindfulness techniques.

## 2024-03-27 NOTE — END OF VISIT
[Time Spent: ___ minutes] : I have spent [unfilled] minutes of time on the encounter. [FreeTextEntry3] : I, Dr. Isis Funes, personally performed the evaluation and management (E/M) services for this established patient who presents today with (a) new problem(s)/exacerbation of (an) existing condition(s).  That E/M includes conducting the examination, assessing all new/exacerbated conditions, and establishing a new plan of care.  Today, my ACP, was here to observe my evaluation and management services for this new problem/exacerbated condition to be followed going forward.

## 2024-03-27 NOTE — CARDIOLOGY SUMMARY
[de-identified] : CONCLUSIONS:   1. Left ventricular cavity is mildly dilated. Left ventricular wall thickness is normal. Left ventricular systolic function is normal with an ejection fraction of 61 % by 3D. There are no regional wall motion abnormalities seen.  2. Left ventricular global longitudinal strain is -20.4 % which is normal (< -18%). Images were acquired on a [employer] ultrasound system and processed using [employer] strain analysis software with a heart rate of 67 bpm and a blood pressure of 160/80 mmHg.  3. Normal left ventricular diastolic function.  4. Mildly enlarged right ventricular cavity size and normal systolic function.  5. Mild mitral regurgitation at a blood pressure of 160/80 mmHg.  6. No pericardial effusion seen.  7. No prior echocardiogram is available for comparison.  8. Right ventricular free wall strain is --23 %.  9. There is normal LV mass and normal geometry.  ________________________________________________________________________________________ FINDINGS:  Left Ventricle: The left ventricular cavity is mildly dilated. Left ventricular wall thickness is normal. Left ventricular systolic function is normal with a calculated ejection fraction of 61 % by 3D. There are no regional wall motion abnormalities seen. There is normal left ventricular diastolic function, with normal filling pressure. There is normal LV mass and normal geometry. Left ventricular global longitudinal strain is -20.4 % which is normal (< -18%). Images were acquired on a [employer] ultrasound system and processed using [employer] strain analysis software with a heart rate of 67 bpm and a blood pressure of 160/80 mmHg.  Right Ventricle: The right ventricular cavity is mildly enlarged in size and normal systolic function. Right ventricular free wall strain is --23 %. Tricuspid annular plane systolic excursion (TAPSE) is 2.1 cm (normal >=1.7 cm).  Left Atrium: The left atrium is normal with an indexed volume of 30.01 ml/m.  Right Atrium: The right atrium is normal in size with an indexed volume of 16.74 ml/m and an indexed area of 6.70 cm/m.  Interatrial Septum: The interatrial septum appears intact.  Aortic Valve: The aortic valve appears trileaflet with normal systolic excursion. There is no aortic valve stenosis. There is no evidence of aortic regurgitation.  Mitral Valve: Structurally normal mitral valve with normal leaflet excursion. There is no mitral valve stenosis. There is mild mitral regurgitation at a blood pressure of 160/80 mmHg.  Tricuspid Valve: Structurally normal tricuspid valve with normal leaflet excursion. There is no evidence of tricuspid stenosis. There is trace tricuspid regurgitation. There is insufficient tricuspid regurgitation detected to calculate pulmonary artery systolic pressure.  Pulmonic Valve: Structurally normal pulmonic valve with normal leaflet excursion. There is no pulmonic valve stenosis. There is trace pulmonic regurgitation.  Aorta: The aortic root appears normal in size. The aortic root at the sinuses of Valsalva is normal in size, measuring 3.50 cm (indexed 1.46 cm/m). The ascending aorta diameter is dilated, measuring 3.60 cm (indexed 1.51 cm/m). The aortic arch diameter is normal in size, measuring 3.2 cm (indexed 1.34 cm/m).  Pericardium: No pericardial effusion seen.  Systemic Veins: The inferior vena cava is normal in size measuring 1.55 cm in diameter, (normal <2.1cm) with normal inspiratory collapse (normal >50%) consistent with normal right atrial pressure (\R\3, range 0-5mmHg). ____________________________________________________________________ QUANTITATIVE DATA: Left Ventricle Measurements: (Indexed to BSA)  IVSd (2D):   0.8 cm LVPWd (2D):  0.8 cm LVIDd (2D):  5.5 cm LVIDs (2D):  3.4 cm LV Mass:     166 g  69.5 g/m 3D LV EF%: 61 %  MV E Vmax:    1.14 m/s MV A Vmax:    0.73 m/s MV E/A:       1.57 e' lateral:   12.90 cm/s e' medial:    7.40 cm/s E/e' lateral: 8.84 E/e' medial:  15.41 E/e' Average: 11.23 MV DT:        151 msec  Aorta Measurements: (Normal range) (Indexed to BSA)  Sinuses of Valsalva: 3.50 cm (2.7 - 3.3 cm) Ao Asc prox:         3.60 cm Ao Arch:             3.2 cm   Left Atrium Measurements: (Indexed to BSA) LA Diam 2D:        3.90 cm LA Vol s, MOD A4C: 71.00 ml. LA Vol s, MOD A2C: 71.10 ml. LA Vol s, MOD BP:  71.70 ml  30.01 ml/m  Right Ventricle Measurements: Right Atrial Measurements:  TAPSE:           2.1 cm       RA Vol:       40.00 ml TV Hannah. S':      18.00 cm/s   RA Vol Index: 16.74 ml/m RV Base (RVID1): 4.6 cm RV Mid (RVID2):  3.3 cm   LVOT / RVOT/ Qp/Qs Data: (Indexed to BSA) LVOT Diameter: 2.20 cm LVOT Vmax:     1.01 m/s LVOT VTI:      22.20 cm LVOT SV:       84.4 ml  35.32 ml/m  Mitral Valve Measurements:  MV E Vmax: 1.1 m/s MV A Vmax: 0.7 m/s MV E/A:    1.6   Tricuspid Valve Measurements:  RA Pressure: 3 mmHg  ________________________________________________________________________________________ Electronically signed on 3/14/2024 at 4:15:29 PM by Dallin Storey M.D.    *** Final ***  Ordered by: RADHA DUNHAM       Collected/Examined: 14Mar2024 12:52PM       Verification Not Required       Stage: Final       Performed at: [facility]       Resulted: 14Mar2024 12:52PM       Last Updated: 14Mar2024 04:15PM       Accession: [id number]       Results Hx:	 There are no additional results to show Details:	 Scheduled:	 Status:	Complete For:	 Recorded as History:	14Mar2024 04:15PM Overdue:	24Mar2024 12:00AM To Be Performed:	 Communicated By:	Recorded Priority:	Routine Ordered By:	DOCUMENT, SCM Supervised By:	DOCUMENT, SCM Managed By:	DOCUMENT, SCM Authorization:	Not Required Performing Instructions:	 Patient Instructions:	 Order Instructions:	 Questions:	          (none) Add'l Details:	 Financial Auth:	 Authorization #:	 Appt. Status:	Appointment Not Needed Effective:	14Mar2024 04:15PM Expires:	14Mar2024 04:15PM Done:	14Mar2024 12:52PM Order #:	[id number] Requisition #:	[id number] Label Type:	 Collection:	Collection Specimen Identifier:	 ID:	 CPT:	 LOINC:	 SNOMED:	 Type:	 Charges:	None Will Be Collected in Office?	No Score:	0 NDS#:	 Content Source:	 Justification:	 View link item history	 Goals:	None Charging:	 Override Encounter Details: Special Billing:	 Account #:	 Injury Date:	3/14/2024 4:15:41 PM Description:	 Encounters:	 Creation:	14Mar2024 Result Review DOCUMENT, SCM Collection:	None Specified Be Done By:	None Specified Scheduled:	None Specified Performed:	None Specified Charge :	None Specified Annotations:

## 2024-04-01 ENCOUNTER — APPOINTMENT (OUTPATIENT)
Dept: PULMONOLOGY | Facility: CLINIC | Age: 54
End: 2024-04-01
Payer: MEDICAID

## 2024-04-01 VITALS — DIASTOLIC BLOOD PRESSURE: 74 MMHG | HEART RATE: 56 BPM | OXYGEN SATURATION: 99 % | SYSTOLIC BLOOD PRESSURE: 121 MMHG

## 2024-04-01 PROCEDURE — 94010 BREATHING CAPACITY TEST: CPT

## 2024-04-01 PROCEDURE — 99214 OFFICE O/P EST MOD 30 MIN: CPT | Mod: 25

## 2024-04-01 RX ORDER — EPINEPHRINE 0.3 MG/.3ML
0.3 INJECTION INTRAMUSCULAR
Qty: 1 | Refills: 2 | Status: ACTIVE | COMMUNITY
Start: 2022-02-16 | End: 1900-01-01

## 2024-04-01 NOTE — CONSULT LETTER
[Dear  ___] : Dear  [unfilled], [Please see my note below.] : Please see my note below. [Consult Letter:] : I had the pleasure of evaluating your patient, [unfilled]. [Consult Closing:] : Thank you very much for allowing me to participate in the care of this patient.  If you have any questions, please do not hesitate to contact me. [Sincerely,] : Sincerely, [FreeTextEntry3] : Abhay Brown D.O., GEMMA\par   of Medicine\par  [employer] [facility] [employer]\par

## 2024-04-01 NOTE — PHYSICAL EXAM
[No Acute Distress] : no acute distress [Normal Oropharynx] : normal oropharynx [II] : Mallampati Class: II [Supple] : supple [Normal Appearance] : normal appearance [No Neck Mass] : no neck mass [No JVD] : no jvd [Normal Rate/Rhythm] : normal rate/rhythm [Normal Pulses] : normal pulses [Normal PMI] : normal pmi [No Murmurs] : no murmurs [Normal S1, S2] : normal s1, s2 [No Rubs] : no rubs [No Gallops] : no gallops [No Resp Distress] : no resp distress [No Acc Muscle Use] : no acc muscle use [Normal Palpation] : normal palpation [Normal Rhythm and Effort] : normal rhythm and effort [Clear to Auscultation Bilaterally] : clear to auscultation bilaterally [No Abnormalities] : no abnormalities [Benign] : benign [Not Tender] : not tender [Soft] : soft [No HSM] : no hsm [Normal Bowel Sounds] : normal bowel sounds [No Clubbing] : no clubbing [No Cyanosis] : no cyanosis [Normal Gait] : normal gait [No Edema] : no edema [FROM] : FROM [Normal Color/ Pigmentation] : normal color/ pigmentation [No Focal Deficits] : no focal deficits [Oriented x3] : oriented x3 [Normal Mood] : normal mood [Normal Affect] : normal affect [TextBox_2] : Overweight

## 2024-04-01 NOTE — REVIEW OF SYSTEMS
[Recent Wt Loss (___ Lbs)] : ~T recent [unfilled] lb weight loss [Negative] : Endocrine [TextBox_30] : HPI [TextBox_44] : History of PVCs on beta-blocker [TextBox_57] : ? Dogs [TextBox_104] : Hx pyogenic granuloma [TextBox_94] : Cervical spine degeneration [TextBox_151] : Schamberg disease, varicose veins [TextBox_122] : Migraine headache

## 2024-04-01 NOTE — PROCEDURE
[FreeTextEntry1] : Erin 4/1/24  flow  rates nl range and stable FEV1  PFT February 26, 2024 Well-preserved flow rates Mild obstructive pattern Ratio 70 Mammograms are normal No air-trapping Diffusion normal range 94% predicted Hemoglobin 13.6 Overall significant improvement of pulmonary physiology  January 12, 2024 CT chest Follow-up to hospitalization CT abnormalities including empyema sepsis Overall impression Interval resolution of the previously left pleural effusion and pneumonia Subcentimeter small mediastinal lymph nodes overall interval decrease in size compared to the study of October 19, 2023 No enlarged axillary mediastinal or hilar lymph nodes Mild biapical scarring unchanged Interval resolution of the previously seen left lower lobe consolidation with mild residual bilateral mid to lower lung zone linear subsegmental atelectatic changes Interval resolution of previously seen right lower lobe nodule consolidation with minimal residual linear opacity consistent with atelectasis  Overall impression interval resolution of previously seen left effusion and pneumonia Minimal to mild bilateral mid to lower lung linear subsegmental atelectasis with some associated left lower lobe hemithorax pleural thickening post empyema Overall clinical improvement No further intervention  ERIN 1/29/24  flow  rates nl  ratio 78 stable  Chest x-ray PA lateral 12/18/2023 status post sepsis pneumonia empyema left post pigtail catheter Cardiac size normal Discoid atelectasis left mid to lower lung zone Mild pleural fibrosis minimal Otherwise lungs are clear without parenchymal infiltrates pleural effusions or dominant pulmonary nodules Continued interval improvement of scarring noted on the prior chest x-ray of 11/9/2023  PFT 12/18/23 erin nl flow rates  lung volumes  nl  DLCO 92 % pred WNL interval improvement pulmonary physiology HGB 13.6   PFT November 9, 2023 post sepsis pneumonia empyema pigtail catheter left lung Mild reduction flow rates Mild obstructive ventilatory impairment Total capacity normal 85% of predicted Diffusion normal range 78% predicted Hemoglobin 13.6 Data comparison to flow rates available from June 30, 2023 demonstrated decline at the FEV1 and FVC Chest x-ray PA lateral November 9, 2023 Cardiac size is normal Right lung is clear Left lung demonstrates some residual scarring atelectatic changes pleural fibrosis with no evidence for any significant pleural effusion Compared to the chest x-ray prior to discharge 11/6/2023 done in the emergency room this demonstrates stability Chest x-ray at discharge on 10/26/2023 from the hospital the above-noted x-ray demonstrates significant interval clearing  Spirometry 6/30/23 Flow  rates nl  Mild OAD mild   decline  Spirometry May 15, 2023 Minimal obstructive ventilatory impairment No bronchodilator response at FEV1 Stable flow rates  Chest x-ray PA lateral May 15 2023 Cardiac size grossly normal Lung fields are clear No parenchymal infiltrate pleural effusions with dominant pulmonary nodules Soft tissue bony structures unremarkable Mediastinum unremarkable Impression clear lungs   Erin 4/6/23 Flow  rates nl without  decline Minimal OAD  Spirometry February 23, 2023 Mild obstructive ventilatory impairment Stable flow rates No decline in overall spirometric data  Erin 1/9/23 Mild OAD pos interval improvement at flow rates  Spirometry November 23, 2022 Ratio 79 No response to bronchodilator at the FEV1  PFT  8/24/22 Well preserved flow rates  Minimal OAD ratio 77   % lung volumes nl  DLCO 100 % nl range  HGB 12.2  PFT 5/13/22 Flow rates nl  ratio 77 Lung Volumes nl  DLCO 117 % pred HGB 13.9 Significant improvement flow rates  Erin No BD 3/30/22 Normal flow rates  PFT 2/16/22 Well preserved flow rates mild OAD  Lung Volumes nl DLCO 89 % pred  HGB 13.9  PFT 11/12/11 Flow rates nl TLC 92 % pred DLCO 94 % HGB 13.3  ERIN No BD 8/12/21 Mild OAD  stable pulmonary physiology  PFT June 16, 2021 Well-preserved flow rates Mild obstructive ventilatory impairment Normal lung volumes Air trapping with RV/TLC ratio 131% predicted Diffusion normal  Chest x-ray PA lateral  June 16, 2021 Normal cardiac size Hyper aeration increased retrosternal airspace Lung grossly clear lungs without parenchymal infiltrates pleural effusions or dominant pulmonary nodules  PCV 20 IM 12/18/23

## 2024-04-02 ENCOUNTER — APPOINTMENT (OUTPATIENT)
Dept: CT IMAGING | Facility: CLINIC | Age: 54
End: 2024-04-02

## 2024-04-14 NOTE — PHYSICAL EXAM
Assessment/Plan   38-year-old male with past medical history of polysubstance use disorder, schizoaffective disorder, bipolar disorder who presented for psychiatric evaluation and STD testing.  Patient reporting paranoia and delusions in the setting of cocaine and cannabis use which she reported earlier in the morning.  He wanted to get HIV testing because he felt he got it in residential.  When asked about SI/HI he said he has self-destructive behavior and could harm himself but he does not want to, denied a plan. Placed on 1:1, admitted to medicine. Hiv/syphilis neg, GC/Chlam pending. Pt is a smoker. Utox was positive for amphetamines, cocaine and fentanyl.  Labs showed EDUARDO and rhabdo CPK elevation to 2.6K and cr of 1.3.  Due to his fluid started on maintenance.    # Rhabdo secondary to substance abuse  # EDUARDO secondary to rhabdo versus dehydration versus substance toxicity  -Status post 2 L of fluids, change maintenance fluids to 250 cc an hour  -CK downtrending, will trend  -Creatinine improved 1.35 to 0.96.   -CPK down trended  -Resolved, DC fluids      #polysubstance abuse  -Leading to rhabdo as above  -Dependence, cocaine and fentanyl  -  consulted for resources for substance use and mental health  - counselling provided by primary    # Schizoaffective disorder, bipolar disorder versus substance-induced psychosis  #passive SI  -Currently suspect substance-induced psychosis.  Reported history of above  -Patient admitted to self-destructive behavior and that he could harm himself but does not have a plan or intents to  -Reported to having paranoia and delusions  -Psych consulted, recommended inpatient psych.  Continue olanzapine, sertraline, as needed Atarax  -Continue one-to-one sitter  -Medically cleared, EPAT notified, COVID ordered.  - Reports his first choice for inpatient psych would be [facility] and if he cannot get in there a second choice would be [facility].  This was communicated to EPAT and in  "the consult    # at risk for STI  # Trichomonas infection  -Patient reported that he feels he had HIV at half-way  -HIV negative, syphilis negative  -Trichomonas positive, start Flagyl, counseling provided.  Will complete 7 days of treatment.  Counseled on need to inform any partners and for them to get treated as well.  Recommended follow-up test outpatient with primary care  - GC/chlamydia negative      Scheduled outpatient appointments in system:   No future appointments.  ---------------------------------------------------------------------------------------------------  Subjective   No new events.  Patient on one-to-one use, sleeping awakens, more participative in conversations.  No acute issues per patient.  Feeling well.  Reports his first choice for inpatient psych would be [facility] and if he cannot get in there a second choice would be [facility].  [facility].   ---------------------------------------------------------------------------------------------------  Objective   Last Recorded Vitals  Blood pressure 118/66, pulse 68, temperature 36.6 °C (97.9 °F), temperature source Temporal, resp. rate 18, height 1.778 m (5' 10\"), weight 109 kg (240 lb), SpO2 98%.  Intake/Output last 3 Shifts:  No intake/output data recorded.    Physical Exam  Vitals and nursing note reviewed.   Constitutional:       General: He is not in acute distress.     Appearance: Normal appearance. He is not ill-appearing or toxic-appearing.      Comments: Sleeping but awakens easily, more interactive and participating but withdrawn   HENT:      Head: Normocephalic and atraumatic.      Mouth/Throat:      Mouth: Mucous membranes are moist.   Eyes:      General: No scleral icterus.     Extraocular Movements: Extraocular movements intact.      Conjunctiva/sclera: Conjunctivae normal.   Cardiovascular:      Rate and Rhythm: Normal rate and regular rhythm.      Heart sounds: S1 normal and S2 normal. No " murmur heard.  Pulmonary:      Effort: Pulmonary effort is normal. No respiratory distress.      Breath sounds: No wheezing, rhonchi or rales.   Abdominal:      General: Bowel sounds are normal. There is no distension.      Palpations: Abdomen is soft.      Tenderness: There is no abdominal tenderness. There is no guarding or rebound.   Musculoskeletal:         General: No swelling or deformity.      Cervical back: Neck supple.   Skin:     General: Skin is warm and dry.      Findings: No rash.   Neurological:      General: No focal deficit present.      Mental Status: He is alert. Mental status is at baseline.      Comments: Moving all extremities   Psychiatric:         Mood and Affect: Mood normal.      Comments: Withdrawn         Relevant Results  Lab Results   Component Value Date    WBC 5.5 04/14/2024    HGB 12.0 (L) 04/14/2024    HCT 36.7 (L) 04/14/2024    MCV 83 04/14/2024     04/14/2024      Lab Results   Component Value Date    GLUCOSE 87 04/14/2024    CALCIUM 8.8 04/14/2024     04/14/2024    K 4.1 04/14/2024    CO2 24 04/14/2024     04/14/2024    BUN 10 04/14/2024    CREATININE 0.87 04/14/2024     Scheduled medications  metroNIDAZOLE, 500 mg, oral, q12h ETHAN  OLANZapine, 15 mg, oral, Nightly      Continuous medications       PRN medications  PRN medications: melatonin    David Anderson MD       [Well Nourished] : well nourished [Well Developed] : well developed [Normal Conjunctiva] : normal conjunctiva [No Acute Distress] : no acute distress [Normal Venous Pressure] : normal venous pressure [Normal S1, S2] : normal S1, S2 [No Carotid Bruit] : no carotid bruit [No Murmur] : no murmur [No Rub] : no rub [No Gallop] : no gallop [Good Air Entry] : good air entry [Clear Lung Fields] : clear lung fields [No Respiratory Distress] : no respiratory distress  [Soft] : abdomen soft [Non Tender] : non-tender [No Masses/organomegaly] : no masses/organomegaly [Normal Bowel Sounds] : normal bowel sounds [Normal Gait] : normal gait [No Edema] : no edema [No Cyanosis] : no cyanosis [No Clubbing] : no clubbing [No Varicosities] : no varicosities [No Rash] : no rash [No Skin Lesions] : no skin lesions [Moves all extremities] : moves all extremities [No Focal Deficits] : no focal deficits [Alert and Oriented] : alert and oriented [Normal Speech] : normal speech [Normal memory] : normal memory

## 2024-04-24 ENCOUNTER — RX RENEWAL (OUTPATIENT)
Age: 54
End: 2024-04-24

## 2024-04-24 RX ORDER — ALBUTEROL SULFATE 90 UG/1
108 (90 BASE) INHALANT RESPIRATORY (INHALATION)
Qty: 6.7 | Refills: 3 | Status: ACTIVE | COMMUNITY
Start: 2022-05-13 | End: 1900-01-01

## 2024-05-16 ENCOUNTER — APPOINTMENT (OUTPATIENT)
Dept: PULMONOLOGY | Facility: CLINIC | Age: 54
End: 2024-05-16
Payer: MEDICAID

## 2024-05-16 VITALS
BODY MASS INDEX: 36.03 KG/M2 | TEMPERATURE: 97.5 F | WEIGHT: 266 LBS | SYSTOLIC BLOOD PRESSURE: 143 MMHG | HEART RATE: 58 BPM | DIASTOLIC BLOOD PRESSURE: 83 MMHG | OXYGEN SATURATION: 97 % | HEIGHT: 72 IN

## 2024-05-16 DIAGNOSIS — R09.89 OTHER SPECIFIED SYMPTOMS AND SIGNS INVOLVING THE CIRCULATORY AND RESPIRATORY SYSTEMS: ICD-10-CM

## 2024-05-16 DIAGNOSIS — B34.9 VIRAL INFECTION, UNSPECIFIED: ICD-10-CM

## 2024-05-16 LAB
FLUAV SPEC QL CULT: NEGATIVE
FLUBV AG SPEC QL IA: NEGATIVE
S PYO AG SPEC QL IA: NEGATIVE
SARS-COV-2 AG RESP QL IA.RAPID: NEGATIVE

## 2024-05-16 PROCEDURE — 99214 OFFICE O/P EST MOD 30 MIN: CPT

## 2024-05-16 PROCEDURE — 87804 INFLUENZA ASSAY W/OPTIC: CPT | Mod: QW

## 2024-05-16 PROCEDURE — 71046 X-RAY EXAM CHEST 2 VIEWS: CPT

## 2024-05-16 PROCEDURE — 87880 STREP A ASSAY W/OPTIC: CPT | Mod: QW

## 2024-05-16 PROCEDURE — 87811 SARS-COV-2 COVID19 W/OPTIC: CPT | Mod: QW

## 2024-05-16 RX ORDER — SULFAMETHOXAZOLE AND TRIMETHOPRIM 800; 160 MG/1; MG/1
800-160 TABLET ORAL TWICE DAILY
Qty: 10 | Refills: 0 | Status: ACTIVE | COMMUNITY
Start: 2024-05-16 | End: 1900-01-01

## 2024-05-17 LAB
INFLUENZA A RESULT: NOT DETECTED
INFLUENZA B RESULT: NOT DETECTED
RESP SYN VIRUS RESULT: NOT DETECTED
SARS-COV-2 RESULT: NOT DETECTED

## 2024-05-22 ENCOUNTER — OUTPATIENT (OUTPATIENT)
Dept: OUTPATIENT SERVICES | Facility: HOSPITAL | Age: 54
LOS: 1 days | End: 2024-05-22
Payer: SELF-PAY

## 2024-05-22 ENCOUNTER — APPOINTMENT (OUTPATIENT)
Dept: CT IMAGING | Facility: CLINIC | Age: 54
End: 2024-05-22
Payer: SELF-PAY

## 2024-05-22 DIAGNOSIS — Z90.49 ACQUIRED ABSENCE OF OTHER SPECIFIED PARTS OF DIGESTIVE TRACT: Chronic | ICD-10-CM

## 2024-05-22 DIAGNOSIS — R06.09 OTHER FORMS OF DYSPNEA: ICD-10-CM

## 2024-05-22 DIAGNOSIS — N60.02 SOLITARY CYST OF LEFT BREAST: Chronic | ICD-10-CM

## 2024-05-22 DIAGNOSIS — I83.019 VARICOSE VEINS OF RIGHT LOWER EXTREMITY WITH ULCER OF UNSPECIFIED SITE: Chronic | ICD-10-CM

## 2024-05-22 DIAGNOSIS — Z98.890 OTHER SPECIFIED POSTPROCEDURAL STATES: Chronic | ICD-10-CM

## 2024-05-22 PROCEDURE — 75571 CT HRT W/O DYE W/CA TEST: CPT

## 2024-05-22 PROCEDURE — 75571 CT HRT W/O DYE W/CA TEST: CPT | Mod: 26

## 2024-05-30 ENCOUNTER — APPOINTMENT (OUTPATIENT)
Dept: PULMONOLOGY | Facility: CLINIC | Age: 54
End: 2024-05-30
Payer: MEDICAID

## 2024-05-30 VITALS — SYSTOLIC BLOOD PRESSURE: 151 MMHG | OXYGEN SATURATION: 96 % | HEART RATE: 59 BPM | DIASTOLIC BLOOD PRESSURE: 81 MMHG

## 2024-05-30 DIAGNOSIS — R76.8 OTHER SPECIFIED ABNORMAL IMMUNOLOGICAL FINDINGS IN SERUM: ICD-10-CM

## 2024-05-30 PROCEDURE — 71046 X-RAY EXAM CHEST 2 VIEWS: CPT

## 2024-05-30 PROCEDURE — 94010 BREATHING CAPACITY TEST: CPT

## 2024-05-30 PROCEDURE — 99214 OFFICE O/P EST MOD 30 MIN: CPT | Mod: 25

## 2024-05-30 PROCEDURE — 36415 COLL VENOUS BLD VENIPUNCTURE: CPT

## 2024-05-30 NOTE — REASON FOR VISIT
[TextBox_44] : pleural effusion, post sepsis /pneumonia/empyema [Follow-Up] : a follow-up visit [Asthma] : asthma

## 2024-05-30 NOTE — REVIEW OF SYSTEMS
[TextBox_30] : HPI [TextBox_44] : History of PVCs on beta-blocker [TextBox_57] : ? Dogs [TextBox_94] : Cervical spine degeneration [TextBox_104] : Hx pyogenic granuloma [TextBox_122] : Migraine headache [TextBox_151] : Schamberg disease, varicose veins [Recent Wt Loss (___ Lbs)] : ~T recent [unfilled] lb weight loss [Negative] : Endocrine

## 2024-05-30 NOTE — PHYSICAL EXAM
[TextBox_2] : Overweight [No Acute Distress] : no acute distress [Normal Oropharynx] : normal oropharynx [II] : Mallampati Class: II [Normal Appearance] : normal appearance [Supple] : supple [No Neck Mass] : no neck mass [No JVD] : no jvd [Normal Rate/Rhythm] : normal rate/rhythm [Normal Pulses] : normal pulses [Normal PMI] : normal pmi [Normal S1, S2] : normal s1, s2 [No Murmurs] : no murmurs [No Rubs] : no rubs [No Gallops] : no gallops [No Resp Distress] : no resp distress [No Acc Muscle Use] : no acc muscle use [Normal Palpation] : normal palpation [Normal Rhythm and Effort] : normal rhythm and effort [Clear to Auscultation Bilaterally] : clear to auscultation bilaterally [No Abnormalities] : no abnormalities [Benign] : benign [Not Tender] : not tender [Soft] : soft [No HSM] : no hsm [Normal Bowel Sounds] : normal bowel sounds [Normal Gait] : normal gait [No Clubbing] : no clubbing [No Cyanosis] : no cyanosis [No Edema] : no edema [FROM] : FROM [Normal Color/ Pigmentation] : normal color/ pigmentation [No Focal Deficits] : no focal deficits [Oriented x3] : oriented x3 [Normal Mood] : normal mood [Normal Affect] : normal affect

## 2024-05-30 NOTE — HISTORY OF PRESENT ILLNESS
[TextBox_4] : Pulmonary evaluation occ low grade very borderline fever pos viral flu like sxs chest congestion  cough no fever  still with pain  left laterla chest  wall site CT almost resolved States following a COVID-vaccine booster February 5, 2024 did develop some post COVID URI symptoms was seen by primary care physician Dr. Ramirez Completed a course of Augmentin At present is feeling better  s/p SVT  cardiology f/u Post hospital transition d/c Oct 27 2023 Data review Pneumonia  with sepsis  empyema s/p pig tail  cath x  4  days Labs CBC WBC 9.60 hemoglobin 11.7 hematocrit 36.5 platelets 535,000 likely reactive Serum electrolytes normal Creatinine 0.49 Serum calcium 9.3 Serum phosphorus 3.0 Serum magnesium 2.0 Status post chest tube removal Pigtail catheter was present No complicating pneumothorax There is loss of volume in the left lung postthoracotomy Noted culture of pleural fluid Streptococcus intermedius Status post tPA instillation for the empyema Augmentin treatment 875 twice daily x4 to 6 weeks We will need at office visit scheduling for a follow-up chest CT scan     CT chest October 19, 2023 Right lower lobe superior segment 2.5 cm nodule consolidative opacity Rule out infection Primary lung malignancy Moderate size loculated left pleural effusion Mediastinal pleural thickening Rule out malignancy Lingula and left lower lobe parenchymal opacification Atelectasis for pneumonia Mildly enlarged mediastinal nodes and left intramammary lymph node indeterminate  Follow-up chest CT scan  Reviewed admission note Patient was admitted to the emergency department with fever Reported lung exam with crackles  CBC WBC 18.37 Impression favor sepsis febrile tachycardic elevated lactate X-ray findings as reviewed above Patient treated with Flagyl and ceftriaxone  no decline Resp  status with cold weather change pos diet  weight loss No decline resp sxs post URI COVID negative occ mucous not foul smelling/ neg heme Longstanding history of asthma since the age of 24 She states that she has had dog exposure dating back to November 2020 she is young for asthma-like symptoms within allergy component She has had wheezing chest congestion cough Subsequently her Qvar that she had been previously treated with was discontinued and she was changed to Wixela continue dosing of Singulair with interval improvement of symptoms She states she believes in November she was treated with a course of steroids for short-term She is not actively wheezing No reported purulent sputum hemoptysis No fevers chills or sweats She denies shortness of breath dyspnea on exertion pleuritic chest pain She has no history of hospitalization or intubation for asthma Denies history of pneumonia tuberculosis latent tuberculosis interstitial lung disease obstructive sleep apnea pulmonary embolism She does report that she is a non-smoker but had significant secondhand tobacco smoke use Notes on beta-blocker for PVCs

## 2024-05-30 NOTE — DISCUSSION/SUMMARY
[FreeTextEntry1] : Viral syndrome URI Patient with exposure to father who has a documented right upper lobe pneumonia Therefore will place on short course of Bactrim DS based on allergy history 1 pill twice daily x 5 days  CXR PFT improvement CT CHEST f/u as per CTX request  interval resolution of previously seen left effusion and pneumonia Minimal to mild bilateral mid to lower lung linear subsegmental atelectasis with some associated left lower lobe hemithorax pleural thickening post empyema with chest x-ray demonstrating interval resolution stable  asthma Post hospital transition d/c Oct 27 2023 Data review Pneumonia  with sepsis  empyema s/p pig tail  cath x  4  days f/u short term interval f/u CT CHEST but weight mentation of mild continued healing as there is clearly demonstrable significant interval improvement from the discharge chest x-ray from the hospital  Chronic persistent asthma poor air quality addressed improvement/stable of pulmonary physiology seasonal allergy Atopy elevated serum IgE level Vascular history as noted above History of secondhand passive tobacco exposure Rule out atopy  allergenic component History of PVCs on beta-blocker but as long as she demonstrates continued stable pulmonary physiology without decline on above-noted medications would not discontinue beta-blocker at this time Recommendations Blood draw for asthma profile Food IgE serum IgE level eosinophil count noted  current dosing of Wixela  250-50 1 puff twice daily with instructions to rinse or Advair  Singulair 10 mg daily with food As needed short acting beta agonist.asthma Notify of any wheezing.  Notify if rescue inhaler is needed greater than 2-3 times in any week.  Avoid known triggers. states cardiac ST ? Stress ECHO  neg Flu up to  date Hep B  booster and  MMR booster and COVID Bivalent vaccine  changed to the Advair discus 250-51 puff twice daily with instructions to rinse

## 2024-05-30 NOTE — PROCEDURE
[FreeTextEntry1] : Chest x-ray PA lateral May 16, 2024 Indication chest congestion URI history of sepsis within the last year Cardiac size is normal Clear lung fields No parenchymal infiltrate pleural effusion or dominant pulmonary nodule Prior noted atelectatic change at left midlung zone has resolved   Erin 4/1/24  flow  rates nl range and stable FEV1  PFT February 26, 2024 Well-preserved flow rates Mild obstructive pattern Ratio 70 Mammograms are normal No air-trapping Diffusion normal range 94% predicted Hemoglobin 13.6 Overall significant improvement of pulmonary physiology  January 12, 2024 CT chest Follow-up to hospitalization CT abnormalities including empyema sepsis Overall impression Interval resolution of the previously left pleural effusion and pneumonia Subcentimeter small mediastinal lymph nodes overall interval decrease in size compared to the study of October 19, 2023 No enlarged axillary mediastinal or hilar lymph nodes Mild biapical scarring unchanged Interval resolution of the previously seen left lower lobe consolidation with mild residual bilateral mid to lower lung zone linear subsegmental atelectatic changes Interval resolution of previously seen right lower lobe nodule consolidation with minimal residual linear opacity consistent with atelectasis Overall impression interval resolution of previously seen left effusion and pneumonia Minimal to mild bilateral mid to lower lung linear subsegmental atelectasis with some associated left lower lobe hemithorax pleural thickening post empyema Overall clinical improvement No further intervention  ERIN 1/29/24  flow  rates nl  ratio 78 stable  Chest x-ray PA lateral 12/18/2023 status post sepsis pneumonia empyema left post pigtail catheter Cardiac size normal Discoid atelectasis left mid to lower lung zone Mild pleural fibrosis minimal Otherwise lungs are clear without parenchymal infiltrates pleural effusions or dominant pulmonary nodules Continued interval improvement of scarring noted on the prior chest x-ray of 11/9/2023  PFT 12/18/23 erin nl flow rates  lung volumes  nl  DLCO 92 % pred WNL interval improvement pulmonary physiology HGB 13.6   PFT November 9, 2023 post sepsis pneumonia empyema pigtail catheter left lung Mild reduction flow rates Mild obstructive ventilatory impairment Total capacity normal 85% of predicted Diffusion normal range 78% predicted Hemoglobin 13.6 Data comparison to flow rates available from June 30, 2023 demonstrated decline at the FEV1 and FVC Chest x-ray PA lateral November 9, 2023 Cardiac size is normal Right lung is clear Left lung demonstrates some residual scarring atelectatic changes pleural fibrosis with no evidence for any significant pleural effusion Compared to the chest x-ray prior to discharge 11/6/2023 done in the emergency room this demonstrates stability Chest x-ray at discharge on 10/26/2023 from the hospital the above-noted x-ray demonstrates significant interval clearing  Spirometry 6/30/23 Flow  rates nl  Mild OAD mild   decline  Spirometry May 15, 2023 Minimal obstructive ventilatory impairment No bronchodilator response at FEV1 Stable flow rates  Chest x-ray PA lateral May 15 2023 Cardiac size grossly normal Lung fields are clear No parenchymal infiltrate pleural effusions with dominant pulmonary nodules Soft tissue bony structures unremarkable Mediastinum unremarkable Impression clear lungs   Erin 4/6/23 Flow  rates nl without  decline Minimal OAD  Spirometry February 23, 2023 Mild obstructive ventilatory impairment Stable flow rates No decline in overall spirometric data  Erin 1/9/23 Mild OAD pos interval improvement at flow rates  Spirometry November 23, 2022 Ratio 79 No response to bronchodilator at the FEV1  PFT  8/24/22 Well preserved flow rates  Minimal OAD ratio 77   % lung volumes nl  DLCO 100 % nl range  HGB 12.2  PFT 5/13/22 Flow rates nl  ratio 77 Lung Volumes nl  DLCO 117 % pred HGB 13.9 Significant improvement flow rates  Erin No BD 3/30/22 Normal flow rates  PFT 2/16/22 Well preserved flow rates mild OAD  Lung Volumes nl DLCO 89 % pred  HGB 13.9  PFT 11/12/11 Flow rates nl TLC 92 % pred DLCO 94 % HGB 13.3  ERIN No BD 8/12/21 Mild OAD  stable pulmonary physiology  PFT June 16, 2021 Well-preserved flow rates Mild obstructive ventilatory impairment Normal lung volumes Air trapping with RV/TLC ratio 131% predicted Diffusion normal  Chest x-ray PA lateral  June 16, 2021 Normal cardiac size Hyper aeration increased retrosternal airspace Lung grossly clear lungs without parenchymal infiltrates pleural effusions or dominant pulmonary nodules  PCV 20 IM 12/18/23

## 2024-05-30 NOTE — HISTORY OF PRESENT ILLNESS
[TextBox_4] : Pulmonary evaluation occ low grade very borderline fever pos viral flu like sxs chest congestion  cough no fever  still with pain  left laterla chest  wall site CT almost resolved States following a COVID-vaccine booster February 5, 2024 did develop some post COVID URI symptoms was seen by primary care physician Dr. Ramirez Completed a course of Augmentin At present is feeling better  s/p SVT  cardiology f/u Post hospital transition d/c Oct 27 2023 Data review Pneumonia  with sepsis  empyema s/p pig tail  cath x  4  days Labs CBC WBC 9.60 hemoglobin 11.7 hematocrit 36.5 platelets 535,000 likely reactive Serum electrolytes normal Creatinine 0.49 Serum calcium 9.3 Serum phosphorus 3.0 Serum magnesium 2.0 Status post chest tube removal Pigtail catheter was present No complicating pneumothorax There is loss of volume in the left lung postthoracotomy Noted culture of pleural fluid Streptococcus intermedius Status post tPA instillation for the empyema Augmentin treatment 875 twice daily x4 to 6 weeks We will need at office visit scheduling for a follow-up chest CT scan     CT chest October 19, 2023 Right lower lobe superior segment 2.5 cm nodule consolidative opacity Rule out infection Primary lung malignancy Moderate size loculated left pleural effusion Mediastinal pleural thickening Rule out malignancy Lingula and left lower lobe parenchymal opacification Atelectasis for pneumonia Mildly enlarged mediastinal nodes and left intramammary lymph node indeterminate  Follow-up chest CT scan  Reviewed admission note Patient was admitted to the emergency department with fever Reported lung exam with crackles  CBC WBC 18.37 Impression favor sepsis febrile tachycardic elevated lactate X-ray findings as reviewed above Patient treated with Flagyl and ceftriaxone  no decline Resp  status with cold weather change pos diet  weight loss No decline resp sxs post URI COVID negative occ mucous not foul smelling/ neg heme Longstanding history of asthma since the age of 24 She states that she has had dog exposure dating back to November 2020 she is young for asthma-like symptoms within allergy component She has had wheezing chest congestion cough Subsequently her Qvar that she had been previously treated with was discontinued and she was changed to Wixela continue dosing of Singulair with interval improvement of symptoms She states she believes in November she was treated with a course of steroids for short-term She is not actively wheezing No reported purulent sputum hemoptysis No fevers chills or sweats She denies shortness of breath dyspnea on exertion pleuritic chest pain She has no history of hospitalization or intubation for asthma Denies history of pneumonia tuberculosis latent tuberculosis interstitial lung disease obstructive sleep apnea pulmonary embolism She does report that she is a non-smoker but had significant secondhand tobacco smoke use Notes on beta-blocker for PVCs  [Never] : never

## 2024-05-30 NOTE — DISCUSSION/SUMMARY
[FreeTextEntry1] : Waxing and waning very low-grade temperature No physiologic evidence of decline pulmonary data Reviewed CT cardiac which did not report any imaging abnormalities although that is limited to Chest x-ray does not demonstrate any recurrence of effusions or pneumonia Will check CBC comprehensive metabolic profile Follow-up primary care  Patient with exposure to father who has a documented right upper lobe pneumonia Therefore will place on short course of Bactrim DS based on allergy history 1 pill twice daily x 5 days  CXR PFT improvement CT CHEST f/u as per CTX request  interval resolution of previously seen left effusion and pneumonia Minimal to mild bilateral mid to lower lung linear subsegmental atelectasis with some associated left lower lobe hemithorax pleural thickening post empyema with chest x-ray demonstrating interval resolution stable  asthma Post hospital transition d/c Oct 27 2023 Data review Pneumonia  with sepsis  empyema s/p pig tail  cath x  4  days f/u short term interval f/u CT CHEST but weight mentation of mild continued healing as there is clearly demonstrable significant interval improvement from the discharge chest x-ray from the hospital  Chronic persistent asthma poor air quality addressed improvement/stable of pulmonary physiology seasonal allergy Atopy elevated serum IgE level Vascular history as noted above History of secondhand passive tobacco exposure Rule out atopy  allergenic component History of PVCs on beta-blocker but as long as she demonstrates continued stable pulmonary physiology without decline on above-noted medications would not discontinue beta-blocker at this time Recommendations Blood draw for asthma profile Food IgE serum IgE level eosinophil count noted  current dosing of Wixela  250-50 1 puff twice daily with instructions to rinse or Advair  Singulair 10 mg daily with food As needed short acting beta agonist.asthma Notify of any wheezing.  Notify if rescue inhaler is needed greater than 2-3 times in any week.  Avoid known triggers. states cardiac ST ? Stress ECHO  neg Flu up to  date Hep B  booster and  MMR booster and COVID Bivalent vaccine  changed to the Advair discus 250-51 puff twice daily with instructions to rinse

## 2024-05-30 NOTE — CONSULT LETTER
[FreeTextEntry3] : Abhay Brown D.O., GEMMA\par   of Medicine\par  [facility] [employer]\par   [Dear  ___] : Dear  [unfilled], [Consult Letter:] : I had the pleasure of evaluating your patient, [unfilled]. [Please see my note below.] : Please see my note below. [Consult Closing:] : Thank you very much for allowing me to participate in the care of this patient.  If you have any questions, please do not hesitate to contact me. [Sincerely,] : Sincerely,

## 2024-05-30 NOTE — REVIEW OF SYSTEMS
[TextBox_30] : HPI [TextBox_44] : History of PVCs on beta-blocker [TextBox_57] : ? Dogs [TextBox_94] : Cervical spine degeneration [TextBox_104] : Hx pyogenic granuloma [TextBox_122] : Migraine headache [TextBox_151] : Schamberg disease, varicose veins

## 2024-05-30 NOTE — PROCEDURE
[FreeTextEntry1] : Spirometry May 30, 2024 Flow rates are normal Patient has demonstrated interval improvement from April 1, 2024  Chest x-ray PA lateral May 30, 2024 Cardiac size is normal Clear lung fields No parenchymal infiltrates pleural effusions or dominant pulmonary nodules Soft tissue bony structures unremarkable No evidence of recurrent pneumonia    Chest x-ray PA lateral May 16, 2024 Indication chest congestion URI history of sepsis within the last year Cardiac size is normal Clear lung fields No parenchymal infiltrate pleural effusion or dominant pulmonary nodule Prior noted atelectatic change at left midlung zone has resolved   Erin 4/1/24  flow  rates nl range and stable FEV1  PFT February 26, 2024 Well-preserved flow rates Mild obstructive pattern Ratio 70 Mammograms are normal No air-trapping Diffusion normal range 94% predicted Hemoglobin 13.6 Overall significant improvement of pulmonary physiology  January 12, 2024 CT chest Follow-up to hospitalization CT abnormalities including empyema sepsis Overall impression Interval resolution of the previously left pleural effusion and pneumonia Subcentimeter small mediastinal lymph nodes overall interval decrease in size compared to the study of October 19, 2023 No enlarged axillary mediastinal or hilar lymph nodes Mild biapical scarring unchanged Interval resolution of the previously seen left lower lobe consolidation with mild residual bilateral mid to lower lung zone linear subsegmental atelectatic changes Interval resolution of previously seen right lower lobe nodule consolidation with minimal residual linear opacity consistent with atelectasis Overall impression interval resolution of previously seen left effusion and pneumonia Minimal to mild bilateral mid to lower lung linear subsegmental atelectasis with some associated left lower lobe hemithorax pleural thickening post empyema Overall clinical improvement No further intervention  ERIN 1/29/24  flow  rates nl  ratio 78 stable  Chest x-ray PA lateral 12/18/2023 status post sepsis pneumonia empyema left post pigtail catheter Cardiac size normal Discoid atelectasis left mid to lower lung zone Mild pleural fibrosis minimal Otherwise lungs are clear without parenchymal infiltrates pleural effusions or dominant pulmonary nodules Continued interval improvement of scarring noted on the prior chest x-ray of 11/9/2023  PFT 12/18/23 erin nl flow rates  lung volumes  nl  DLCO 92 % pred WNL interval improvement pulmonary physiology HGB 13.6   PFT November 9, 2023 post sepsis pneumonia empyema pigtail catheter left lung Mild reduction flow rates Mild obstructive ventilatory impairment Total capacity normal 85% of predicted Diffusion normal range 78% predicted Hemoglobin 13.6 Data comparison to flow rates available from June 30, 2023 demonstrated decline at the FEV1 and FVC Chest x-ray PA lateral November 9, 2023 Cardiac size is normal Right lung is clear Left lung demonstrates some residual scarring atelectatic changes pleural fibrosis with no evidence for any significant pleural effusion Compared to the chest x-ray prior to discharge 11/6/2023 done in the emergency room this demonstrates stability Chest x-ray at discharge on 10/26/2023 from the hospital the above-noted x-ray demonstrates significant interval clearing  Spirometry 6/30/23 Flow  rates nl  Mild OAD mild   decline  Spirometry May 15, 2023 Minimal obstructive ventilatory impairment No bronchodilator response at FEV1 Stable flow rates  Chest x-ray PA lateral May 15 2023 Cardiac size grossly normal Lung fields are clear No parenchymal infiltrate pleural effusions with dominant pulmonary nodules Soft tissue bony structures unremarkable Mediastinum unremarkable Impression clear lungs   Erin 4/6/23 Flow  rates nl without  decline Minimal OAD  Spirometry February 23, 2023 Mild obstructive ventilatory impairment Stable flow rates No decline in overall spirometric data  Erin 1/9/23 Mild OAD pos interval improvement at flow rates  Spirometry November 23, 2022 Ratio 79 No response to bronchodilator at the FEV1  PFT  8/24/22 Well preserved flow rates  Minimal OAD ratio 77   % lung volumes nl  DLCO 100 % nl range  HGB 12.2  PFT 5/13/22 Flow rates nl  ratio 77 Lung Volumes nl  DLCO 117 % pred HGB 13.9 Significant improvement flow rates  Erin No BD 3/30/22 Normal flow rates  PFT 2/16/22 Well preserved flow rates mild OAD  Lung Volumes nl DLCO 89 % pred  HGB 13.9  PFT 11/12/11 Flow rates nl TLC 92 % pred DLCO 94 % HGB 13.3  ERIN No BD 8/12/21 Mild OAD  stable pulmonary physiology  PFT June 16, 2021 Well-preserved flow rates Mild obstructive ventilatory impairment Normal lung volumes Air trapping with RV/TLC ratio 131% predicted Diffusion normal  Chest x-ray PA lateral  June 16, 2021 Normal cardiac size Hyper aeration increased retrosternal airspace Lung grossly clear lungs without parenchymal infiltrates pleural effusions or dominant pulmonary nodules  PCV 20 IM 12/18/23

## 2024-05-30 NOTE — CONSULT LETTER
[FreeTextEntry3] : Abhay Brown D.O., GEMMA\par   of Medicine\par  [facility] [employer]\par

## 2024-05-31 ENCOUNTER — NON-APPOINTMENT (OUTPATIENT)
Age: 54
End: 2024-05-31

## 2024-05-31 LAB
ALBUMIN SERPL ELPH-MCNC: 4.4 G/DL
ALP BLD-CCNC: 72 U/L
ALT SERPL-CCNC: 24 U/L
ANION GAP SERPL CALC-SCNC: 11 MMOL/L
AST SERPL-CCNC: 23 U/L
BASOPHILS # BLD AUTO: 0.05 K/UL
BASOPHILS NFR BLD AUTO: 0.7 %
BILIRUB SERPL-MCNC: 0.5 MG/DL
BUN SERPL-MCNC: 13 MG/DL
CALCIUM SERPL-MCNC: 9.9 MG/DL
CHLORIDE SERPL-SCNC: 100 MMOL/L
CO2 SERPL-SCNC: 26 MMOL/L
CREAT SERPL-MCNC: 0.64 MG/DL
EGFR: 106 ML/MIN/1.73M2
EOSINOPHIL # BLD AUTO: 0.15 K/UL
EOSINOPHIL NFR BLD AUTO: 2 %
GLUCOSE SERPL-MCNC: 87 MG/DL
HCT VFR BLD CALC: 39.8 %
HGB BLD-MCNC: 13.2 G/DL
IMM GRANULOCYTES NFR BLD AUTO: 0.3 %
LYMPHOCYTES # BLD AUTO: 1.94 K/UL
LYMPHOCYTES NFR BLD AUTO: 25.3 %
MAN DIFF?: NORMAL
MCHC RBC-ENTMCNC: 32.2 PG
MCHC RBC-ENTMCNC: 33.2 GM/DL
MCV RBC AUTO: 97.1 FL
MONOCYTES # BLD AUTO: 0.5 K/UL
MONOCYTES NFR BLD AUTO: 6.5 %
NEUTROPHILS # BLD AUTO: 5 K/UL
NEUTROPHILS NFR BLD AUTO: 65.2 %
PLATELET # BLD AUTO: 244 K/UL
POTASSIUM SERPL-SCNC: 3.8 MMOL/L
PROT SERPL-MCNC: 7.2 G/DL
RBC # BLD: 4.1 M/UL
RBC # FLD: 11.8 %
SODIUM SERPL-SCNC: 136 MMOL/L
WBC # FLD AUTO: 7.66 K/UL

## 2024-07-01 ENCOUNTER — APPOINTMENT (OUTPATIENT)
Dept: PULMONOLOGY | Facility: CLINIC | Age: 54
End: 2024-07-01
Payer: MEDICAID

## 2024-07-01 VITALS — OXYGEN SATURATION: 97 % | SYSTOLIC BLOOD PRESSURE: 138 MMHG | HEART RATE: 66 BPM | DIASTOLIC BLOOD PRESSURE: 83 MMHG

## 2024-07-01 DIAGNOSIS — Z88.9 ALLERGY STATUS TO UNSPECIFIED DRUGS, MEDICAMENTS AND BIOLOGICAL SUBSTANCES: ICD-10-CM

## 2024-07-01 DIAGNOSIS — J45.909 UNSPECIFIED ASTHMA, UNCOMPLICATED: ICD-10-CM

## 2024-07-01 PROCEDURE — 94060 EVALUATION OF WHEEZING: CPT

## 2024-07-01 PROCEDURE — 99214 OFFICE O/P EST MOD 30 MIN: CPT | Mod: 25

## 2024-08-15 ENCOUNTER — APPOINTMENT (OUTPATIENT)
Dept: PULMONOLOGY | Facility: CLINIC | Age: 54
End: 2024-08-15
Payer: MEDICAID

## 2024-08-15 VITALS
RESPIRATION RATE: 16 BRPM | OXYGEN SATURATION: 96 % | HEART RATE: 65 BPM | TEMPERATURE: 97.6 F | DIASTOLIC BLOOD PRESSURE: 71 MMHG | SYSTOLIC BLOOD PRESSURE: 139 MMHG

## 2024-08-15 DIAGNOSIS — J45.909 UNSPECIFIED ASTHMA, UNCOMPLICATED: ICD-10-CM

## 2024-08-15 DIAGNOSIS — R76.8 OTHER SPECIFIED ABNORMAL IMMUNOLOGICAL FINDINGS IN SERUM: ICD-10-CM

## 2024-08-15 DIAGNOSIS — Z88.9 ALLERGY STATUS TO UNSPECIFIED DRUGS, MEDICAMENTS AND BIOLOGICAL SUBSTANCES: ICD-10-CM

## 2024-08-15 PROCEDURE — 94060 EVALUATION OF WHEEZING: CPT

## 2024-08-15 PROCEDURE — 99214 OFFICE O/P EST MOD 30 MIN: CPT | Mod: 25

## 2024-08-15 PROCEDURE — 71046 X-RAY EXAM CHEST 2 VIEWS: CPT

## 2024-08-15 RX ORDER — AZITHROMYCIN 250 MG/1
250 TABLET, FILM COATED ORAL
Qty: 1 | Refills: 0 | Status: ACTIVE | COMMUNITY
Start: 2024-08-15 | End: 1900-01-01

## 2024-08-15 RX ORDER — METHYLPREDNISOLONE 4 MG/1
4 TABLET ORAL
Qty: 1 | Refills: 0 | Status: ACTIVE | COMMUNITY
Start: 2024-08-15 | End: 1900-01-01

## 2024-08-15 NOTE — REASON FOR VISIT
[Follow-Up] : a follow-up visit [Asthma] : asthma [TextBox_44] : pleural effusion, post sepsis /pneumonia/empyema history

## 2024-08-15 NOTE — HISTORY OF PRESENT ILLNESS
[Never] : never [TextBox_4] : Pulmonary evaluation post concert  cough  chest congestion COVID Rapid AG neg x2  no fever   no chills   still with pain  left laterla chest  wall site CT almost resolved States following a COVID-vaccine booster February 5, 2024 did develop some post COVID URI symptoms was seen by primary care physician Dr. Ramirez Completed a course of Augmentin At present is feeling better  s/p SVT  cardiology f/u Post hospital transition d/c Oct 27 2023 Data review Pneumonia  with sepsis  empyema s/p pig tail  cath x  4  days Labs CBC WBC 9.60 hemoglobin 11.7 hematocrit 36.5 platelets 535,000 likely reactive Serum electrolytes normal Creatinine 0.49 Serum calcium 9.3 Serum phosphorus 3.0 Serum magnesium 2.0 Status post chest tube removal Pigtail catheter was present No complicating pneumothorax There is loss of volume in the left lung postthoracotomy Noted culture of pleural fluid Streptococcus intermedius Status post tPA instillation for the empyema Augmentin treatment 875 twice daily x4 to 6 weeks We will need at office visit scheduling for a follow-up chest CT scan     CT chest October 19, 2023 Right lower lobe superior segment 2.5 cm nodule consolidative opacity Rule out infection Primary lung malignancy Moderate size loculated left pleural effusion Mediastinal pleural thickening Rule out malignancy Lingula and left lower lobe parenchymal opacification Atelectasis for pneumonia Mildly enlarged mediastinal nodes and left intramammary lymph node indeterminate  Follow-up chest CT scan  Reviewed admission note Patient was admitted to the emergency department with fever Reported lung exam with crackles  CBC WBC 18.37 Impression favor sepsis febrile tachycardic elevated lactate X-ray findings as reviewed above Patient treated with Flagyl and ceftriaxone  no decline Resp  status with cold weather change pos diet  weight loss No decline resp sxs post URI COVID negative occ mucous not foul smelling/ neg heme Longstanding history of asthma since the age of 24 She states that she has had dog exposure dating back to November 2020 she is young for asthma-like symptoms within allergy component She has had wheezing chest congestion cough Subsequently her Qvar that she had been previously treated with was discontinued and she was changed to Wixela continue dosing of Singulair with interval improvement of symptoms She states she believes in November she was treated with a course of steroids for short-term She is not actively wheezing No reported purulent sputum hemoptysis No fevers chills or sweats She denies shortness of breath dyspnea on exertion pleuritic chest pain She has no history of hospitalization or intubation for asthma Denies history of pneumonia tuberculosis latent tuberculosis interstitial lung disease obstructive sleep apnea pulmonary embolism She does report that she is a non-smoker but had significant secondhand tobacco smoke use Notes on beta-blocker for PVCs

## 2024-08-15 NOTE — CONSULT LETTER
[Dear  ___] : Dear  [unfilled], [Consult Letter:] : I had the pleasure of evaluating your patient, [unfilled]. [Please see my note below.] : Please see my note below. [Consult Closing:] : Thank you very much for allowing me to participate in the care of this patient.  If you have any questions, please do not hesitate to contact me. [Sincerely,] : Sincerely, [FreeTextEntry3] : Abhay Brown D.O., GEMMA\par   of Medicine\par  [facility] [employer]\par

## 2024-08-15 NOTE — DISCUSSION/SUMMARY
[FreeTextEntry1] :  Cough and Z-Loi Medrol Dosepak Chest x-ray does not demonstrate any recurrence of effusions or pneumonia from prior visits  Follow-up primary care  CXR PFT improvement CT CHEST f/u as per CTX request  interval resolution of previously seen left effusion and pneumonia Minimal to mild bilateral mid to lower lung linear subsegmental atelectasis with some associated left lower lobe hemithorax pleural thickening post empyema with chest x-ray demonstrating interval resolution stable  asthma Post hospital transition d/c Oct 27 2023 Data review Pneumonia  with sepsis  empyema s/p pig tail  cath x  4  days f/u short term interval f/u CT CHEST but weight mentation of mild continued healing as there is clearly demonstrable significant interval improvement from the discharge chest x-ray from the hospital  Chronic persistent asthma poor air quality addressed improvement/stable of pulmonary physiology seasonal allergy Atopy elevated serum IgE level Vascular history as noted above History of secondhand passive tobacco exposure Rule out atopy  allergenic component History of PVCs on beta-blocker but as long as she demonstrates continued stable pulmonary physiology without decline on above-noted medications would not discontinue beta-blocker at this time Recommendations Blood draw for asthma profile Food IgE serum IgE level eosinophil count noted  current dosing of Wixela  250-50 1 puff twice daily with instructions to rinse or Advair  Singulair 10 mg daily with food As needed short acting beta agonist.asthma Notify of any wheezing.  Notify if rescue inhaler is needed greater than 2-3 times in any week.  Avoid known triggers. states cardiac ST ? Stress ECHO  neg Flu up to  date Hep B  booster and  MMR booster and COVID Bivalent vaccine  changed to the Advair discus 250-51 puff twice daily with instructions to rinse

## 2024-08-15 NOTE — PROCEDURE
[FreeTextEntry1] : Spirometry August 15, 2024 Flow rates are normal range Ratio 78 Interval improvement compared to data of July 1, 2024 Chest x-ray PA lateral August 15, 2024 indication persistent cough Cardiac size globular shape are normal Lung fields are clear No parenchymal infiltrates pleural effusions or dominant pulmonary nodules Some very minimal left lower lobe thickening this is an area where patient had had a prior pneumonia empyema No acute infiltrates   Spirometry July 1, 2024 Flow rates are normal range Ratio 76 No bronchodilator response in FEV1 There is a mild decline of the flow rates recommendation for clinical correlation  Coronary artery calcium study May 22, 2024 Lung findings no focal consolidation of the imaged portion of the lungs  Spirometry May 30, 2024 Flow rates are normal Patient has demonstrated interval improvement from April 1, 2024  Chest x-ray PA lateral May 30, 2024 Cardiac size is normal Clear lung fields No parenchymal infiltrates pleural effusions or dominant pulmonary nodules Soft tissue bony structures unremarkable No evidence of recurrent pneumonia    Chest x-ray PA lateral May 16, 2024 Indication chest congestion URI history of sepsis within the last year Cardiac size is normal Clear lung fields No parenchymal infiltrate pleural effusion or dominant pulmonary nodule Prior noted atelectatic change at left midlung zone has resolved   Erin 4/1/24  flow  rates nl range and stable FEV1  PFT February 26, 2024 Well-preserved flow rates Mild obstructive pattern Ratio 70 Lung volumes are normal No air-trapping Diffusion normal range 94% predicted Hemoglobin 13.6 Overall significant improvement of pulmonary physiology  January 12, 2024 CT chest Follow-up to hospitalization CT abnormalities including empyema sepsis Overall impression Interval resolution of the previously left pleural effusion and pneumonia Subcentimeter small mediastinal lymph nodes overall interval decrease in size compared to the study of October 19, 2023 No enlarged axillary mediastinal or hilar lymph nodes Mild biapical scarring unchanged Interval resolution of the previously seen left lower lobe consolidation with mild residual bilateral mid to lower lung zone linear subsegmental atelectatic changes Interval resolution of previously seen right lower lobe nodule consolidation with minimal residual linear opacity consistent with atelectasis Overall impression interval resolution of previously seen left effusion and pneumonia Minimal to mild bilateral mid to lower lung linear subsegmental atelectasis with some associated left lower lobe hemithorax pleural thickening post empyema Overall clinical improvement No further intervention  ERIN 1/29/24  flow  rates nl  ratio 78 stable  Chest x-ray PA lateral 12/18/2023 status post sepsis pneumonia empyema left post pigtail catheter Cardiac size normal Discoid atelectasis left mid to lower lung zone Mild pleural fibrosis minimal Otherwise lungs are clear without parenchymal infiltrates pleural effusions or dominant pulmonary nodules Continued interval improvement of scarring noted on the prior chest x-ray of 11/9/2023  PFT 12/18/23 erin nl flow rates  lung volumes  nl  DLCO 92 % pred WNL interval improvement pulmonary physiology HGB 13.6   PFT November 9, 2023 post sepsis pneumonia empyema pigtail catheter left lung Mild reduction flow rates Mild obstructive ventilatory impairment Total capacity normal 85% of predicted Diffusion normal range 78% predicted Hemoglobin 13.6 Data comparison to flow rates available from June 30, 2023 demonstrated decline at the FEV1 and FVC Chest x-ray PA lateral November 9, 2023 Cardiac size is normal Right lung is clear Left lung demonstrates some residual scarring atelectatic changes pleural fibrosis with no evidence for any significant pleural effusion Compared to the chest x-ray prior to discharge 11/6/2023 done in the emergency room this demonstrates stability Chest x-ray at discharge on 10/26/2023 from the hospital the above-noted x-ray demonstrates significant interval clearing  Spirometry 6/30/23 Flow  rates nl  Mild OAD mild   decline  Spirometry May 15, 2023 Minimal obstructive ventilatory impairment No bronchodilator response at FEV1 Stable flow rates  Chest x-ray PA lateral May 15 2023 Cardiac size grossly normal Lung fields are clear No parenchymal infiltrate pleural effusions with dominant pulmonary nodules Soft tissue bony structures unremarkable Mediastinum unremarkable Impression clear lungs   Erin 4/6/23 Flow  rates nl without  decline Minimal OAD  Spirometry February 23, 2023 Mild obstructive ventilatory impairment Stable flow rates No decline in overall spirometric data  Erin 1/9/23 Mild OAD pos interval improvement at flow rates  Spirometry November 23, 2022 Ratio 79 No response to bronchodilator at the FEV1  PFT  8/24/22 Well preserved flow rates  Minimal OAD ratio 77   % lung volumes nl  DLCO 100 % nl range  HGB 12.2  PFT 5/13/22 Flow rates nl  ratio 77 Lung Volumes nl  DLCO 117 % pred HGB 13.9 Significant improvement flow rates  [place] No BD 3/30/22 Normal flow rates  PFT 2/16/22 Well preserved flow rates mild OAD  Lung Volumes nl DLCO 89 % pred  HGB 13.9  PFT 11/12/11 Flow rates nl TLC 92 % pred DLCO 94 % HGB 13.3  ERIN No BD 8/12/21 Mild OAD  stable pulmonary physiology  PFT June 16, 2021 Well-preserved flow rates Mild obstructive ventilatory impairment Normal lung volumes Air trapping with RV/TLC ratio 131% predicted Diffusion normal  Chest x-ray PA lateral  June 16, 2021 Normal cardiac size Hyper aeration increased retrosternal airspace Lung grossly clear lungs without parenchymal infiltrates pleural effusions or dominant pulmonary nodules  PCV 20 IM 12/18/23

## 2024-08-22 ENCOUNTER — RX RENEWAL (OUTPATIENT)
Age: 54
End: 2024-08-22

## 2024-10-04 ENCOUNTER — APPOINTMENT (OUTPATIENT)
Dept: PULMONOLOGY | Facility: CLINIC | Age: 54
End: 2024-10-04
Payer: MEDICAID

## 2024-10-04 VITALS
OXYGEN SATURATION: 95 % | HEART RATE: 65 BPM | SYSTOLIC BLOOD PRESSURE: 154 MMHG | DIASTOLIC BLOOD PRESSURE: 88 MMHG | TEMPERATURE: 98.5 F

## 2024-10-04 DIAGNOSIS — R06.09 OTHER FORMS OF DYSPNEA: ICD-10-CM

## 2024-10-04 DIAGNOSIS — R76.8 OTHER SPECIFIED ABNORMAL IMMUNOLOGICAL FINDINGS IN SERUM: ICD-10-CM

## 2024-10-04 DIAGNOSIS — J45.909 UNSPECIFIED ASTHMA, UNCOMPLICATED: ICD-10-CM

## 2024-10-04 PROCEDURE — 99214 OFFICE O/P EST MOD 30 MIN: CPT | Mod: 25

## 2024-10-04 PROCEDURE — 94010 BREATHING CAPACITY TEST: CPT

## 2024-10-04 PROCEDURE — G0008: CPT

## 2024-10-04 PROCEDURE — 90656 IIV3 VACC NO PRSV 0.5 ML IM: CPT

## 2024-10-04 PROCEDURE — 94727 GAS DIL/WSHOT DETER LNG VOL: CPT

## 2024-10-04 PROCEDURE — 94729 DIFFUSING CAPACITY: CPT

## 2024-10-04 PROCEDURE — ZZZZZ: CPT

## 2024-10-04 NOTE — PROCEDURE
[FreeTextEntry1] : PFT on October 4, 2024 Spirometric Florexa normal range mild obstructive pattern Lung volumes are normal No air-trapping Diffusion normal 95% predicted Overall stable pulmonary physiology without decline   Spirometry August 15, 2024 Flow rates are normal range Ratio 78 Interval improvement compared to data of July 1, 2024 Chest x-ray PA lateral August 15, 2024 indication persistent cough Cardiac size globular shape are normal Lung fields are clear No parenchymal infiltrates pleural effusions or dominant pulmonary nodules Some very minimal left lower lobe thickening this is an area where patient had had a prior pneumonia empyema No acute infiltrates   Spirometry July 1, 2024 Flow rates are normal range Ratio 76 No bronchodilator response in FEV1 There is a mild decline of the flow rates recommendation for clinical correlation  Coronary artery calcium study May 22, 2024 Lung findings no focal consolidation of the imaged portion of the lungs  Spirometry May 30, 2024 Flow rates are normal Patient has demonstrated interval improvement from April 1, 2024  Chest x-ray PA lateral May 30, 2024 Cardiac size is normal Clear lung fields No parenchymal infiltrates pleural effusions or dominant pulmonary nodules Soft tissue bony structures unremarkable No evidence of recurrent pneumonia    Chest x-ray PA lateral May 16, 2024 Indication chest congestion URI history of sepsis within the last year Cardiac size is normal Clear lung fields No parenchymal infiltrate pleural effusion or dominant pulmonary nodule Prior noted atelectatic change at left midlung zone has resolved   Erin 4/1/24  flow  rates nl range and stable FEV1  PFT February 26, 2024 Well-preserved flow rates Mild obstructive pattern Ratio 70 Lung volumes are normal No air-trapping Diffusion normal range 94% predicted Hemoglobin 13.6 Overall significant improvement of pulmonary physiology  January 12, 2024 CT chest Follow-up to hospitalization CT abnormalities including empyema sepsis Overall impression Interval resolution of the previously left pleural effusion and pneumonia Subcentimeter small mediastinal lymph nodes overall interval decrease in size compared to the study of October 19, 2023 No enlarged axillary mediastinal or hilar lymph nodes Mild biapical scarring unchanged Interval resolution of the previously seen left lower lobe consolidation with mild residual bilateral mid to lower lung zone linear subsegmental atelectatic changes Interval resolution of previously seen right lower lobe nodule consolidation with minimal residual linear opacity consistent with atelectasis Overall impression interval resolution of previously seen left effusion and pneumonia Minimal to mild bilateral mid to lower lung linear subsegmental atelectasis with some associated left lower lobe hemithorax pleural thickening post empyema Overall clinical improvement No further intervention  ERIN 1/29/24  flow  rates nl  ratio 78 stable  Chest x-ray PA lateral 12/18/2023 status post sepsis pneumonia empyema left post pigtail catheter Cardiac size normal Discoid atelectasis left mid to lower lung zone Mild pleural fibrosis minimal Otherwise lungs are clear without parenchymal infiltrates pleural effusions or dominant pulmonary nodules Continued interval improvement of scarring noted on the prior chest x-ray of 11/9/2023  PFT 12/18/23 erin nl flow rates  lung volumes  nl  DLCO 92 % pred WNL interval improvement pulmonary physiology HGB 13.6   PFT November 9, 2023 post sepsis pneumonia empyema pigtail catheter left lung Mild reduction flow rates Mild obstructive ventilatory impairment Total capacity normal 85% of predicted Diffusion normal range 78% predicted Hemoglobin 13.6 Data comparison to flow rates available from June 30, 2023 demonstrated decline at the FEV1 and FVC Chest x-ray PA lateral November 9, 2023 Cardiac size is normal Right lung is clear Left lung demonstrates some residual scarring atelectatic changes pleural fibrosis with no evidence for any significant pleural effusion Compared to the chest x-ray prior to discharge 11/6/2023 done in the emergency room this demonstrates stability Chest x-ray at discharge on 10/26/2023 from the hospital the above-noted x-ray demonstrates significant interval clearing  Spirometry 6/30/23 Flow  rates nl  Mild OAD mild   decline  Spirometry May 15, 2023 Minimal obstructive ventilatory impairment No bronchodilator response at FEV1 Stable flow rates  Chest x-ray PA lateral May 15 2023 Cardiac size grossly normal Lung fields are clear No parenchymal infiltrate pleural effusions with dominant pulmonary nodules Soft tissue bony structures unremarkable Mediastinum unremarkable Impression clear lungs   Erin 4/6/23 Flow  rates nl without  decline Minimal OAD  Spirometry February 23, 2023 Mild obstructive ventilatory impairment Stable flow rates No decline in overall spirometric data  Erin 1/9/23 Mild OAD pos interval improvement at flow rates  Spirometry November 23, 2022 Ratio 79 No response to bronchodilator at the FEV1  PFT  8/24/22 Well preserved flow rates  Minimal OAD ratio 77   % lung volumes nl  DLCO 100 % nl range  HGB 12.2  PFT 5/13/22 Flow rates nl  ratio 77 Lung Volumes nl  DLCO 117 % pred HGB 13.9 Significant improvement flow rates  Erin No BD 3/30/22 Normal flow rates  PFT 2/16/22 Well preserved flow rates mild OAD  Lung Volumes nl DLCO 89 % pred  HGB 13.9  PFT 11/12/11 Flow rates nl TLC 92 % pred DLCO 94 % HGB 13.3  ERIN No BD 8/12/21 Mild OAD  stable pulmonary physiology  PFT June 16, 2021 Well-preserved flow rates Mild obstructive ventilatory impairment Normal lung volumes Air trapping with RV/TLC ratio 131% predicted Diffusion normal  Chest x-ray PA lateral  June 16, 2021 Normal cardiac size Hyper aeration increased retrosternal airspace Lung grossly clear lungs without parenchymal infiltrates pleural effusions or dominant pulmonary nodules  PCV 20 IM 12/18/23 Flu vaccination IM October 4, 2024

## 2024-10-04 NOTE — REASON FOR VISIT
[Follow-Up] : a follow-up visit [Asthma] : asthma [TextBox_44] : hx resolved pleural effusion, post sepsis /pneumonia/empyema history

## 2024-10-04 NOTE — CONSULT LETTER
[Dear  ___] : Dear  [unfilled], [Consult Letter:] : I had the pleasure of evaluating your patient, [unfilled]. [Please see my note below.] : Please see my note below. [Consult Closing:] : Thank you very much for allowing me to participate in the care of this patient.  If you have any questions, please do not hesitate to contact me. [Sincerely,] : Sincerely, [FreeTextEntry3] : Abhay Brown D.O., GEMMA\par   of Medicine\par  [facility] [employer]\par

## 2024-10-19 ENCOUNTER — EMERGENCY (EMERGENCY)
Facility: HOSPITAL | Age: 54
LOS: 1 days | Discharge: HOME | End: 2024-10-19
Attending: EMERGENCY MEDICINE | Admitting: EMERGENCY MEDICINE
Payer: MEDICAID

## 2024-10-19 ENCOUNTER — NON-APPOINTMENT (OUTPATIENT)
Age: 54
End: 2024-10-19

## 2024-10-19 VITALS
HEART RATE: 98 BPM | TEMPERATURE: 98 F | OXYGEN SATURATION: 96 % | RESPIRATION RATE: 16 BRPM | WEIGHT: 283.07 LBS | HEIGHT: 72 IN | SYSTOLIC BLOOD PRESSURE: 160 MMHG | DIASTOLIC BLOOD PRESSURE: 87 MMHG

## 2024-10-19 DIAGNOSIS — N60.02 SOLITARY CYST OF LEFT BREAST: Chronic | ICD-10-CM

## 2024-10-19 DIAGNOSIS — Z98.890 OTHER SPECIFIED POSTPROCEDURAL STATES: Chronic | ICD-10-CM

## 2024-10-19 DIAGNOSIS — Z90.49 ACQUIRED ABSENCE OF OTHER SPECIFIED PARTS OF DIGESTIVE TRACT: Chronic | ICD-10-CM

## 2024-10-19 DIAGNOSIS — I83.019 VARICOSE VEINS OF RIGHT LOWER EXTREMITY WITH ULCER OF UNSPECIFIED SITE: Chronic | ICD-10-CM

## 2024-10-19 LAB
ALBUMIN SERPL ELPH-MCNC: 4.3 G/DL — SIGNIFICANT CHANGE UP (ref 3.3–5)
ALP SERPL-CCNC: 61 U/L — SIGNIFICANT CHANGE UP (ref 40–120)
ALT FLD-CCNC: 34 U/L — HIGH (ref 4–33)
ANION GAP SERPL CALC-SCNC: 15 MMOL/L — HIGH (ref 7–14)
APPEARANCE UR: ABNORMAL
AST SERPL-CCNC: 47 U/L — HIGH (ref 4–32)
BACTERIA # UR AUTO: ABNORMAL /HPF
BASOPHILS # BLD AUTO: 0.05 K/UL — SIGNIFICANT CHANGE UP (ref 0–0.2)
BASOPHILS NFR BLD AUTO: 0.7 % — SIGNIFICANT CHANGE UP (ref 0–2)
BILIRUB SERPL-MCNC: 0.5 MG/DL — SIGNIFICANT CHANGE UP (ref 0.2–1.2)
BILIRUB UR-MCNC: NEGATIVE — SIGNIFICANT CHANGE UP
BLOOD GAS VENOUS COMPREHENSIVE RESULT: SIGNIFICANT CHANGE UP
BUN SERPL-MCNC: 8 MG/DL — SIGNIFICANT CHANGE UP (ref 7–23)
CALCIUM SERPL-MCNC: 9.7 MG/DL — SIGNIFICANT CHANGE UP (ref 8.4–10.5)
CHLORIDE SERPL-SCNC: 100 MMOL/L — SIGNIFICANT CHANGE UP (ref 98–107)
CO2 SERPL-SCNC: 22 MMOL/L — SIGNIFICANT CHANGE UP (ref 22–31)
COLOR SPEC: YELLOW — SIGNIFICANT CHANGE UP
CREAT SERPL-MCNC: 0.53 MG/DL — SIGNIFICANT CHANGE UP (ref 0.5–1.3)
DIFF PNL FLD: NEGATIVE — SIGNIFICANT CHANGE UP
EGFR: 111 ML/MIN/1.73M2 — SIGNIFICANT CHANGE UP
EOSINOPHIL # BLD AUTO: 0.08 K/UL — SIGNIFICANT CHANGE UP (ref 0–0.5)
EOSINOPHIL NFR BLD AUTO: 1.1 % — SIGNIFICANT CHANGE UP (ref 0–6)
EPI CELLS # UR: PRESENT
GLUCOSE SERPL-MCNC: 96 MG/DL — SIGNIFICANT CHANGE UP (ref 70–99)
GLUCOSE UR QL: NEGATIVE MG/DL — SIGNIFICANT CHANGE UP
HCT VFR BLD CALC: 39.1 % — SIGNIFICANT CHANGE UP (ref 34.5–45)
HGB BLD-MCNC: 13.6 G/DL — SIGNIFICANT CHANGE UP (ref 11.5–15.5)
IANC: 5.75 K/UL — SIGNIFICANT CHANGE UP (ref 1.8–7.4)
IMM GRANULOCYTES NFR BLD AUTO: 0.3 % — SIGNIFICANT CHANGE UP (ref 0–0.9)
KETONES UR-MCNC: ABNORMAL MG/DL
LEUKOCYTE ESTERASE UR-ACNC: ABNORMAL
LYMPHOCYTES # BLD AUTO: 1.28 K/UL — SIGNIFICANT CHANGE UP (ref 1–3.3)
LYMPHOCYTES # BLD AUTO: 17.1 % — SIGNIFICANT CHANGE UP (ref 13–44)
MCHC RBC-ENTMCNC: 32.4 PG — SIGNIFICANT CHANGE UP (ref 27–34)
MCHC RBC-ENTMCNC: 34.8 GM/DL — SIGNIFICANT CHANGE UP (ref 32–36)
MCV RBC AUTO: 93.1 FL — SIGNIFICANT CHANGE UP (ref 80–100)
MONOCYTES # BLD AUTO: 0.3 K/UL — SIGNIFICANT CHANGE UP (ref 0–0.9)
MONOCYTES NFR BLD AUTO: 4 % — SIGNIFICANT CHANGE UP (ref 2–14)
NEUTROPHILS # BLD AUTO: 5.75 K/UL — SIGNIFICANT CHANGE UP (ref 1.8–7.4)
NEUTROPHILS NFR BLD AUTO: 76.8 % — SIGNIFICANT CHANGE UP (ref 43–77)
NITRITE UR-MCNC: NEGATIVE — SIGNIFICANT CHANGE UP
NRBC # BLD: 0 /100 WBCS — SIGNIFICANT CHANGE UP (ref 0–0)
NRBC # FLD: 0 K/UL — SIGNIFICANT CHANGE UP (ref 0–0)
PH UR: 6.5 — SIGNIFICANT CHANGE UP (ref 5–8)
PLATELET # BLD AUTO: 258 K/UL — SIGNIFICANT CHANGE UP (ref 150–400)
POTASSIUM SERPL-MCNC: 4.4 MMOL/L — SIGNIFICANT CHANGE UP (ref 3.5–5.3)
POTASSIUM SERPL-SCNC: 4.4 MMOL/L — SIGNIFICANT CHANGE UP (ref 3.5–5.3)
PROT SERPL-MCNC: 7.7 G/DL — SIGNIFICANT CHANGE UP (ref 6–8.3)
PROT UR-MCNC: 30 MG/DL
RBC # BLD: 4.2 M/UL — SIGNIFICANT CHANGE UP (ref 3.8–5.2)
RBC # FLD: 11.7 % — SIGNIFICANT CHANGE UP (ref 10.3–14.5)
RBC CASTS # UR COMP ASSIST: 1 /HPF — SIGNIFICANT CHANGE UP (ref 0–4)
SODIUM SERPL-SCNC: 137 MMOL/L — SIGNIFICANT CHANGE UP (ref 135–145)
SP GR SPEC: 1.01 — SIGNIFICANT CHANGE UP (ref 1–1.03)
T4 FREE+ TSH PNL SERPL: 0.94 UIU/ML — SIGNIFICANT CHANGE UP (ref 0.27–4.2)
TROPONIN T, HIGH SENSITIVITY RESULT: 8 NG/L — SIGNIFICANT CHANGE UP
UROBILINOGEN FLD QL: 0.2 MG/DL — SIGNIFICANT CHANGE UP (ref 0.2–1)
WBC # BLD: 7.48 K/UL — SIGNIFICANT CHANGE UP (ref 3.8–10.5)
WBC # FLD AUTO: 7.48 K/UL — SIGNIFICANT CHANGE UP (ref 3.8–10.5)
WBC UR QL: 10 /HPF — SIGNIFICANT CHANGE UP (ref 0–5)

## 2024-10-19 PROCEDURE — 99285 EMERGENCY DEPT VISIT HI MDM: CPT

## 2024-10-19 PROCEDURE — 93010 ELECTROCARDIOGRAM REPORT: CPT

## 2024-10-19 PROCEDURE — 71046 X-RAY EXAM CHEST 2 VIEWS: CPT | Mod: 26

## 2024-10-19 NOTE — ED PROVIDER NOTE - CARE PLAN
Continue current Lisinopril and Hydrochlorothiazide treatment. Return for nursing blood pressure check in 2 weeks.   Principal Discharge DX:	High blood pressure   1

## 2024-10-19 NOTE — ED ADULT TRIAGE NOTE - CHIEF COMPLAINT QUOTE
dizziness, lightheaded   intermittently  x 3  days.. vomited wed.   states elevated bp today- 162/92.   denies dizziness at present.  denies ch pains. denies balance disturbance,weakness extremities

## 2024-10-19 NOTE — ED PROVIDER NOTE - CLINICAL SUMMARY MEDICAL DECISION MAKING FREE TEXT BOX
Patient is a 54 year old female who presents with lightheadedness. Patient without headache, chest pain, shortness of breath or other symptoms at this time. Unremarkable physical exam.     Exam without evidence of volume overload. EKG without signs of active ischemia. Given the timing of pain to emergency department presentation, plan to send single troponin to evaluate for NSTEMI as cause of her symptoms. Presentation not consistent with acute PE (Wells low risk 0), pneumothorax, thoracic arotic dissection, cardiac effusion or tamponade. Other differential diagnoses include but are not limited to electrolyte abnormalities, infection, thyroid abnormalities.    Plan: labs, EKG, CXR, reassessment Patient is a 54 year old female who presents with lightheadedness. Patient without headache, chest pain, shortness of breath or other symptoms at this time. Unremarkable physical exam.     Exam without evidence of volume overload. EKG without signs of active ischemia (Heart Score of 2). Given the timing of pain to emergency department presentation, plan to send single troponin to evaluate for NSTEMI as cause of her symptoms. Presentation not consistent with acute PE (Wells low risk 0), pneumothorax, thoracic aortic dissection, cardiac effusion or tamponade. Other differential diagnoses include but are not limited to electrolyte abnormalities, infection, thyroid abnormalities.    Plan: labs, EKG, CXR, reassessment

## 2024-10-19 NOTE — ED PROVIDER NOTE - NSFOLLOWUPINSTRUCTIONS_ED_ALL_ED_FT
You were seen in the emergency department today for high blood pressure and lightheadedness. Your evaluation, including blood tests, chest x-ray and EKG, does not show any evidence of a heart attack.     Keep a log of your blood pressure readings and bring it with you to an appointment with primary care doctor. Please follow up with your primary care doctor within a week for further management of your blood pressure.     Return to the emergency department if you experience new, worsening or uncontrolled chest pain, shortness of breath, feeling faint, nausea, vomiting, or any other concerning symptoms. You were seen in the emergency department today for high blood pressure and lightheadedness. Your evaluation, including blood tests, chest x-ray and EKG, does not show any evidence of a heart attack.     Keep a log of your blood pressure readings and bring it with you to an appointment with primary care doctor. Please follow up with your primary care doctor within a week for further management of your blood pressure.     Your Chest x-ray showed a small pleural effusion, otherwise known as a collection of fluid on the left side. This should absorb on its own, but follow up with your primary care doctor about this as well.    Return to the emergency department if you experience new, worsening or uncontrolled chest pain, shortness of breath, feeling faint, nausea, vomiting, or any other concerning symptoms.

## 2024-10-19 NOTE — ED PROVIDER NOTE - HOW PATIENT ADDRESSED, PROFILE
Quality 131: Pain Assessment And Follow-Up: Pain assessment using a standardized tool is documented as negative, no follow-up plan required Quality 474: Zoster Vaccination Status: Shingrix Vaccination not Administered or Previously Received, Reason not Otherwise Specified Detail Level: Detailed Dana Quality 265: Biopsy Follow-Up: Biopsy results reviewed, communicated, tracked, and documented Quality 130: Documentation Of Current Medications In The Medical Record: Current Medications Documented Quality 317: Preventative Care And Screening: Screening For High Blood Pressure And Follow-Up Documented: Patient refuses to participate (either BP measurement or follow-up). Quality 431: Preventive Care And Screening: Unhealthy Alcohol Use - Screening: Patient screened for unhealthy alcohol use using a single question and scores less than 2 times per year Quality 134: Screening For Clinical Depression And Follow-Up Plan: The patient was screened for depression and the screen was negative and no follow up required Quality 128: Preventive Care And Screening: Body Mass Index (Bmi) Screening And Follow-Up Plan: BMI is documented within normal parameters and no follow-up plan is required. Quality 110: Preventive Care And Screening: Influenza Immunization: Influenza Immunization Administered during Influenza season Quality 226: Preventive Care And Screening: Tobacco Use: Screening And Cessation Intervention: Patient screened for tobacco use and is an ex/non-smoker

## 2024-10-19 NOTE — ED PROVIDER NOTE - PROGRESS NOTE DETAILS
Yasmin Rivas MD, PGY-1: Patient re-assessed and resting comfortably. Discussed with patient results of work up, strict return precautions, and need for follow up.  Patient expressed understanding and agrees with plan.

## 2024-10-19 NOTE — ED PROVIDER NOTE - ATTENDING CONTRIBUTION TO CARE
54F h/o SVT, pna, vertigo, came in from  for high blood pressure and lightheadedness over past 2 days. Sometimes feels like dizziness, sometimes feels like room spinning.  No CP, no SOB.  Pt injured her shoulder a few days ago, worse with arm movement, was hurt moving a pt.  Reports doesn't have HTN as a dx.  Exam benign aside from HTN on VS.  Plan check labs eval for organ dysfunction, if all acceptable can d/c home f/u PMD for BP mgmt.  Shoulders nontender and no deformity, distal NVT intact.  Unlikely ACS.  Unlikely stroke - normal neuro exam.  EKG with RBBB but similar to previous.  VS:  unremarkable except HTN    GEN - NAD; malaise;   A+O x3   HEAD - NC/AT     ENT - PEERL, EOMI, mucous membranes    moist , no discharge      NECK: Neck supple, non-tender without lymphadenopathy, no masses, no JVD  PULM - CTA b/l,  symmetric breath sounds  COR -  normal heart sounds    ABD - , ND, NT, soft,  BACK - no CVA tenderness, nontender spine     EXTREMS - no edema, no deformity, warm and well perfused    SKIN - no rash    or bruising      NEUROLOGIC - alert, face symmetric, speech fluent, sensation nl, motor no focal deficit.

## 2024-10-19 NOTE — ED ADULT NURSE NOTE - NSFALLUNIVINTERV_ED_ALL_ED
Bed/Stretcher in lowest position, wheels locked, appropriate side rails in place/Call bell, personal items and telephone in reach/Instruct patient to call for assistance before getting out of bed/chair/stretcher/Non-slip footwear applied when patient is off stretcher/[place] to call system/Physically safe environment - no spills, clutter or unnecessary equipment/Purposeful proactive rounding/Room/bathroom lighting operational, light cord in reach

## 2024-10-19 NOTE — ED PROVIDER NOTE - OBJECTIVE STATEMENT
Patient is a 54-year-old female with past medical history of SVT and PVCs who presents to the emergency department for evaluation of high blood pressure and lightheadedness. Patient is a 54-year-old female with past medical history of SVT and PVCs who presents to the emergency department for evaluation of high blood pressure and lightheadedness. Patient states she has been under a lot of stress recently as her father was recently hospitalized and was just discharged 2 days ago.  She has been taking her blood pressure during this time as noted it has been high, however today she noted it was higher than over the past week.  She has also had intermittent vertigo over the last week.  She takes Dramamine for this and has been evaluated for her vertigo previously.  She states her lightheadedness somewhat feels like her vertigo but feels a little bit different today.  She went to urgent care because of her high blood pressure she noticed this morning in addition to the lightheadedness.  Urgent care told her to go to the emergency department for further evaluation.  No chest pain, headache, shortness of breath, fever, chills, abdominal pain, difficulty walking.  She notes some shoulder pain, which she attributes to an injury helping move her father a couple days ago.

## 2024-10-19 NOTE — ED PROVIDER NOTE - PHYSICAL EXAMINATION
Vital signs reviewed and remarkable for hypertension.  CONSTITUTIONAL: NAD   HEAD: Normocephalic; atraumatic  EYES: EOMI, PERRLA, no conjunctival injection, no scleral icterus, no nystagmus  MOUTH/THROAT:  MMM  NECK: Trachea midline, no JVD  CV: Normal S1, S2; no audible murmurs  RESP: normal work of breathing; CTAB   ABD: soft, non-distended; non-tender to palpation   MSK/EXT: no LE edema, no limited ROM  SKIN: No rashes on exposed skin surfaces  NEURO: Moves all extremities spontaneously with no focal deficits, speech is appropriate  PSYCH: well kempt, calm, interactive

## 2024-10-19 NOTE — ED PROVIDER NOTE - PATIENT PORTAL LINK FT
You can access the FollowMyHealth Patient Portal offered by [facility] by registering at the following website: [email or web address]. By joining [employer]’s FollowMyHealth portal, you will also be able to view your health information using other applications (apps) compatible with our system.

## 2024-10-19 NOTE — ED ADULT NURSE NOTE - OBJECTIVE STATEMENT
Received patient in Intake 8 c/o high blood pressure, dizziness, lightheadedness. PMHX SVT, PVCs. Patient denies SOB, chest pain, fever. Patient is A&OX4, ambulatory, airway patent, breathing unlabored and even, radial pulses palpable. Labs obtained, 22G IV placed on left hand, awaiting X-ray. Side rails up and safety maintained. Call bells within reach.

## 2024-12-05 ENCOUNTER — APPOINTMENT (OUTPATIENT)
Dept: PULMONOLOGY | Facility: CLINIC | Age: 54
End: 2024-12-05
Payer: MEDICAID

## 2024-12-05 VITALS — SYSTOLIC BLOOD PRESSURE: 141 MMHG | OXYGEN SATURATION: 96 % | DIASTOLIC BLOOD PRESSURE: 68 MMHG | HEART RATE: 60 BPM

## 2024-12-05 DIAGNOSIS — J90 PLEURAL EFFUSION, NOT ELSEWHERE CLASSIFIED: ICD-10-CM

## 2024-12-05 DIAGNOSIS — J45.909 UNSPECIFIED ASTHMA, UNCOMPLICATED: ICD-10-CM

## 2024-12-05 DIAGNOSIS — Z88.9 ALLERGY STATUS TO UNSPECIFIED DRUGS, MEDICAMENTS AND BIOLOGICAL SUBSTANCES: ICD-10-CM

## 2024-12-05 PROCEDURE — 99214 OFFICE O/P EST MOD 30 MIN: CPT | Mod: 25

## 2024-12-05 PROCEDURE — 71046 X-RAY EXAM CHEST 2 VIEWS: CPT

## 2024-12-05 PROCEDURE — 94010 BREATHING CAPACITY TEST: CPT

## 2025-01-24 ENCOUNTER — NON-APPOINTMENT (OUTPATIENT)
Age: 55
End: 2025-01-24

## 2025-01-24 ENCOUNTER — APPOINTMENT (OUTPATIENT)
Dept: CARDIOLOGY | Facility: CLINIC | Age: 55
End: 2025-01-24
Payer: MEDICAID

## 2025-01-24 VITALS
HEART RATE: 64 BPM | OXYGEN SATURATION: 100 % | SYSTOLIC BLOOD PRESSURE: 139 MMHG | WEIGHT: 275 LBS | BODY MASS INDEX: 37.3 KG/M2 | DIASTOLIC BLOOD PRESSURE: 81 MMHG

## 2025-01-24 PROCEDURE — 93000 ELECTROCARDIOGRAM COMPLETE: CPT

## 2025-01-24 PROCEDURE — G2211 COMPLEX E/M VISIT ADD ON: CPT | Mod: NC

## 2025-01-24 PROCEDURE — 99214 OFFICE O/P EST MOD 30 MIN: CPT

## 2025-02-05 ENCOUNTER — APPOINTMENT (OUTPATIENT)
Dept: PULMONOLOGY | Facility: CLINIC | Age: 55
End: 2025-02-05
Payer: MEDICAID

## 2025-02-05 VITALS — HEART RATE: 64 BPM | SYSTOLIC BLOOD PRESSURE: 130 MMHG | OXYGEN SATURATION: 99 % | DIASTOLIC BLOOD PRESSURE: 78 MMHG

## 2025-02-05 DIAGNOSIS — J45.909 UNSPECIFIED ASTHMA, UNCOMPLICATED: ICD-10-CM

## 2025-02-05 DIAGNOSIS — R76.8 OTHER SPECIFIED ABNORMAL IMMUNOLOGICAL FINDINGS IN SERUM: ICD-10-CM

## 2025-02-05 DIAGNOSIS — Z88.9 ALLERGY STATUS TO UNSPECIFIED DRUGS, MEDICAMENTS AND BIOLOGICAL SUBSTANCES: ICD-10-CM

## 2025-02-05 PROCEDURE — 94010 BREATHING CAPACITY TEST: CPT

## 2025-02-05 PROCEDURE — 99213 OFFICE O/P EST LOW 20 MIN: CPT | Mod: 25

## 2025-02-13 ENCOUNTER — RX RENEWAL (OUTPATIENT)
Age: 55
End: 2025-02-13

## 2025-03-27 ENCOUNTER — APPOINTMENT (OUTPATIENT)
Dept: PULMONOLOGY | Facility: CLINIC | Age: 55
End: 2025-03-27
Payer: MEDICAID

## 2025-03-27 VITALS — DIASTOLIC BLOOD PRESSURE: 78 MMHG | SYSTOLIC BLOOD PRESSURE: 132 MMHG | HEART RATE: 61 BPM | OXYGEN SATURATION: 98 %

## 2025-03-27 DIAGNOSIS — J45.909 UNSPECIFIED ASTHMA, UNCOMPLICATED: ICD-10-CM

## 2025-03-27 DIAGNOSIS — Z88.9 ALLERGY STATUS TO UNSPECIFIED DRUGS, MEDICAMENTS AND BIOLOGICAL SUBSTANCES: ICD-10-CM

## 2025-03-27 DIAGNOSIS — R76.8 OTHER SPECIFIED ABNORMAL IMMUNOLOGICAL FINDINGS IN SERUM: ICD-10-CM

## 2025-03-27 PROCEDURE — 94060 EVALUATION OF WHEEZING: CPT

## 2025-03-27 PROCEDURE — 99214 OFFICE O/P EST MOD 30 MIN: CPT | Mod: 25

## 2025-06-09 ENCOUNTER — APPOINTMENT (OUTPATIENT)
Dept: PULMONOLOGY | Facility: CLINIC | Age: 55
End: 2025-06-09
Payer: MEDICAID

## 2025-06-09 VITALS — SYSTOLIC BLOOD PRESSURE: 122 MMHG | DIASTOLIC BLOOD PRESSURE: 74 MMHG | HEART RATE: 58 BPM | OXYGEN SATURATION: 95 %

## 2025-06-09 PROCEDURE — 99214 OFFICE O/P EST MOD 30 MIN: CPT

## 2025-06-09 PROCEDURE — G2211 COMPLEX E/M VISIT ADD ON: CPT | Mod: NC

## 2025-06-30 RX ORDER — FLUTICASONE PROPIONATE AND SALMETEROL 50; 250 UG/1; UG/1
250-50 POWDER RESPIRATORY (INHALATION)
Qty: 1 | Refills: 3 | Status: ACTIVE | COMMUNITY
Start: 2025-06-30 | End: 1900-01-01

## 2025-07-28 ENCOUNTER — APPOINTMENT (OUTPATIENT)
Dept: CARDIOLOGY | Facility: CLINIC | Age: 55
End: 2025-07-28
Payer: MEDICAID

## 2025-07-28 VITALS
SYSTOLIC BLOOD PRESSURE: 167 MMHG | BODY MASS INDEX: 37.57 KG/M2 | OXYGEN SATURATION: 96 % | DIASTOLIC BLOOD PRESSURE: 90 MMHG | WEIGHT: 277 LBS | HEART RATE: 66 BPM

## 2025-07-28 PROCEDURE — 93000 ELECTROCARDIOGRAM COMPLETE: CPT

## 2025-07-28 PROCEDURE — 99215 OFFICE O/P EST HI 40 MIN: CPT | Mod: 25
